# Patient Record
Sex: MALE | Race: WHITE | Employment: OTHER | URBAN - METROPOLITAN AREA
[De-identification: names, ages, dates, MRNs, and addresses within clinical notes are randomized per-mention and may not be internally consistent; named-entity substitution may affect disease eponyms.]

---

## 2020-01-01 ENCOUNTER — APPOINTMENT (OUTPATIENT)
Dept: GENERAL RADIOLOGY | Age: 70
DRG: 190 | End: 2020-01-01
Attending: INTERNAL MEDICINE
Payer: MEDICARE

## 2020-01-01 ENCOUNTER — APPOINTMENT (OUTPATIENT)
Dept: NON INVASIVE DIAGNOSTICS | Age: 70
DRG: 190 | End: 2020-01-01
Attending: INTERNAL MEDICINE
Payer: MEDICARE

## 2020-01-01 ENCOUNTER — HOSPICE ADMISSION (OUTPATIENT)
Dept: HOSPICE | Facility: HOSPICE | Age: 70
End: 2020-01-01
Payer: MEDICARE

## 2020-01-01 ENCOUNTER — APPOINTMENT (OUTPATIENT)
Dept: GENERAL RADIOLOGY | Age: 70
DRG: 190 | End: 2020-01-01
Attending: NURSE PRACTITIONER
Payer: MEDICARE

## 2020-01-01 ENCOUNTER — APPOINTMENT (OUTPATIENT)
Dept: GENERAL RADIOLOGY | Age: 70
DRG: 190 | End: 2020-01-01
Attending: EMERGENCY MEDICINE
Payer: MEDICARE

## 2020-01-01 ENCOUNTER — HOSPITAL ENCOUNTER (INPATIENT)
Age: 70
LOS: 1 days | DRG: 951 | End: 2020-03-26
Attending: FAMILY MEDICINE | Admitting: FAMILY MEDICINE
Payer: OTHER MISCELLANEOUS

## 2020-01-01 ENCOUNTER — HOSPITAL ENCOUNTER (INPATIENT)
Age: 70
LOS: 14 days | Discharge: HOSPICE | DRG: 190 | End: 2020-03-25
Attending: EMERGENCY MEDICINE | Admitting: INTERNAL MEDICINE
Payer: MEDICARE

## 2020-01-01 ENCOUNTER — APPOINTMENT (OUTPATIENT)
Dept: CT IMAGING | Age: 70
DRG: 190 | End: 2020-01-01
Attending: EMERGENCY MEDICINE
Payer: MEDICARE

## 2020-01-01 VITALS
OXYGEN SATURATION: 88 % | HEART RATE: 90 BPM | HEIGHT: 69 IN | BODY MASS INDEX: 22.16 KG/M2 | WEIGHT: 149.6 LBS | DIASTOLIC BLOOD PRESSURE: 58 MMHG | SYSTOLIC BLOOD PRESSURE: 145 MMHG | RESPIRATION RATE: 21 BRPM | TEMPERATURE: 98 F

## 2020-01-01 VITALS
TEMPERATURE: 97.9 F | OXYGEN SATURATION: 87 % | DIASTOLIC BLOOD PRESSURE: 67 MMHG | RESPIRATION RATE: 9 BRPM | SYSTOLIC BLOOD PRESSURE: 158 MMHG | HEART RATE: 96 BPM

## 2020-01-01 DIAGNOSIS — J96.21 ACUTE ON CHRONIC RESPIRATORY FAILURE WITH HYPOXIA (HCC): ICD-10-CM

## 2020-01-01 DIAGNOSIS — J44.1 COPD WITH ACUTE EXACERBATION (HCC): ICD-10-CM

## 2020-01-01 DIAGNOSIS — F41.9 ANXIETY: ICD-10-CM

## 2020-01-01 DIAGNOSIS — Z71.89 DNR (DO NOT RESUSCITATE) DISCUSSION: ICD-10-CM

## 2020-01-01 DIAGNOSIS — J44.1 ACUTE EXACERBATION OF CHRONIC OBSTRUCTIVE PULMONARY DISEASE (COPD) (HCC): Primary | ICD-10-CM

## 2020-01-01 DIAGNOSIS — Z71.89 GOALS OF CARE, COUNSELING/DISCUSSION: ICD-10-CM

## 2020-01-01 DIAGNOSIS — J20.9 ACUTE BRONCHITIS, UNSPECIFIED ORGANISM: ICD-10-CM

## 2020-01-01 DIAGNOSIS — Z71.89 ADVANCED CARE PLANNING/COUNSELING DISCUSSION: ICD-10-CM

## 2020-01-01 DIAGNOSIS — R06.02 SOB (SHORTNESS OF BREATH): ICD-10-CM

## 2020-01-01 DIAGNOSIS — R06.02 SHORTNESS OF BREATH: ICD-10-CM

## 2020-01-01 LAB
ALBUMIN SERPL-MCNC: 3 G/DL (ref 3.5–5)
ALBUMIN SERPL-MCNC: 3.1 G/DL (ref 3.5–5)
ALBUMIN SERPL-MCNC: 3.2 G/DL (ref 3.5–5)
ALBUMIN SERPL-MCNC: 3.2 G/DL (ref 3.5–5)
ALBUMIN SERPL-MCNC: 3.3 G/DL (ref 3.5–5)
ALBUMIN SERPL-MCNC: 3.4 G/DL (ref 3.5–5)
ALBUMIN SERPL-MCNC: 3.5 G/DL (ref 3.5–5)
ALBUMIN SERPL-MCNC: 3.5 G/DL (ref 3.5–5)
ALBUMIN SERPL-MCNC: 3.6 G/DL (ref 3.5–5)
ALBUMIN SERPL-MCNC: 4 G/DL (ref 3.5–5)
ALBUMIN/GLOB SERPL: 1 {RATIO} (ref 1.1–2.2)
ALBUMIN/GLOB SERPL: 1.2 {RATIO} (ref 1.1–2.2)
ALP SERPL-CCNC: 56 U/L (ref 45–117)
ALP SERPL-CCNC: 57 U/L (ref 45–117)
ALP SERPL-CCNC: 60 U/L (ref 45–117)
ALP SERPL-CCNC: 66 U/L (ref 45–117)
ALP SERPL-CCNC: 77 U/L (ref 45–117)
ALT SERPL-CCNC: 25 U/L (ref 12–78)
ALT SERPL-CCNC: 30 U/L (ref 12–78)
ALT SERPL-CCNC: 31 U/L (ref 12–78)
ALT SERPL-CCNC: 34 U/L (ref 12–78)
ALT SERPL-CCNC: 38 U/L (ref 12–78)
ANION GAP SERPL CALC-SCNC: 1 MMOL/L (ref 5–15)
ANION GAP SERPL CALC-SCNC: 2 MMOL/L (ref 5–15)
ANION GAP SERPL CALC-SCNC: 3 MMOL/L (ref 5–15)
ANION GAP SERPL CALC-SCNC: 3 MMOL/L (ref 5–15)
ANION GAP SERPL CALC-SCNC: 4 MMOL/L (ref 5–15)
ANION GAP SERPL CALC-SCNC: 5 MMOL/L (ref 5–15)
ANION GAP SERPL CALC-SCNC: 5 MMOL/L (ref 5–15)
ANION GAP SERPL CALC-SCNC: 6 MMOL/L (ref 5–15)
ANION GAP SERPL CALC-SCNC: 6 MMOL/L (ref 5–15)
ANION GAP SERPL CALC-SCNC: 7 MMOL/L (ref 5–15)
APPEARANCE UR: ABNORMAL
ARTERIAL PATENCY WRIST A: ABNORMAL
ARTERIAL PATENCY WRIST A: YES
AST SERPL-CCNC: 17 U/L (ref 15–37)
AST SERPL-CCNC: 22 U/L (ref 15–37)
AST SERPL-CCNC: 23 U/L (ref 15–37)
AST SERPL-CCNC: 28 U/L (ref 15–37)
AST SERPL-CCNC: 33 U/L (ref 15–37)
ATRIAL RATE: 136 BPM
ATRIAL RATE: 138 BPM
ATRIAL RATE: 96 BPM
AV VELOCITY RATIO: 0.7
B PERT DNA SPEC QL NAA+PROBE: NOT DETECTED
BACTERIA SPEC CULT: NORMAL
BACTERIA SPEC CULT: NORMAL
BASE EXCESS BLDA CALC-SCNC: 0.6 MMOL/L
BASE EXCESS BLDA CALC-SCNC: 1.2 MMOL/L
BASE EXCESS BLDA CALC-SCNC: 1.8 MMOL/L
BASE EXCESS BLDA CALC-SCNC: 10.3 MMOL/L
BASE EXCESS BLDA CALC-SCNC: 10.6 MMOL/L
BASE EXCESS BLDA CALC-SCNC: 11.9 MMOL/L
BASE EXCESS BLDA CALC-SCNC: 4.8 MMOL/L
BASE EXCESS BLDA CALC-SCNC: 5.8 MMOL/L
BASOPHILS # BLD: 0 K/UL (ref 0–0.1)
BASOPHILS NFR BLD: 0 % (ref 0–1)
BDY SITE: ABNORMAL
BILIRUB SERPL-MCNC: 0.2 MG/DL (ref 0.2–1)
BILIRUB SERPL-MCNC: 0.2 MG/DL (ref 0.2–1)
BILIRUB SERPL-MCNC: 0.5 MG/DL (ref 0.2–1)
BILIRUB SERPL-MCNC: 0.5 MG/DL (ref 0.2–1)
BILIRUB SERPL-MCNC: 0.6 MG/DL (ref 0.2–1)
BILIRUB UR QL: NEGATIVE
BNP SERPL-MCNC: 2976 PG/ML
BORDETELLA PARAPERTUSSIS PCR, BORPAR: NOT DETECTED
BUN SERPL-MCNC: 10 MG/DL (ref 6–20)
BUN SERPL-MCNC: 16 MG/DL (ref 6–20)
BUN SERPL-MCNC: 18 MG/DL (ref 6–20)
BUN SERPL-MCNC: 19 MG/DL (ref 6–20)
BUN SERPL-MCNC: 19 MG/DL (ref 6–20)
BUN SERPL-MCNC: 22 MG/DL (ref 6–20)
BUN SERPL-MCNC: 23 MG/DL (ref 6–20)
BUN SERPL-MCNC: 26 MG/DL (ref 6–20)
BUN SERPL-MCNC: 7 MG/DL (ref 6–20)
BUN SERPL-MCNC: 9 MG/DL (ref 6–20)
BUN/CREAT SERPL: 11 (ref 12–20)
BUN/CREAT SERPL: 16 (ref 12–20)
BUN/CREAT SERPL: 21 (ref 12–20)
BUN/CREAT SERPL: 22 (ref 12–20)
BUN/CREAT SERPL: 23 (ref 12–20)
BUN/CREAT SERPL: 24 (ref 12–20)
BUN/CREAT SERPL: 25 (ref 12–20)
BUN/CREAT SERPL: 27 (ref 12–20)
BUN/CREAT SERPL: 27 (ref 12–20)
BUN/CREAT SERPL: 33 (ref 12–20)
BUN/CREAT SERPL: 8 (ref 12–20)
C PNEUM DNA SPEC QL NAA+PROBE: NOT DETECTED
CALCIUM SERPL-MCNC: 7.8 MG/DL (ref 8.5–10.1)
CALCIUM SERPL-MCNC: 8.1 MG/DL (ref 8.5–10.1)
CALCIUM SERPL-MCNC: 8.2 MG/DL (ref 8.5–10.1)
CALCIUM SERPL-MCNC: 8.3 MG/DL (ref 8.5–10.1)
CALCIUM SERPL-MCNC: 8.4 MG/DL (ref 8.5–10.1)
CALCIUM SERPL-MCNC: 8.4 MG/DL (ref 8.5–10.1)
CALCIUM SERPL-MCNC: 8.5 MG/DL (ref 8.5–10.1)
CALCIUM SERPL-MCNC: 8.6 MG/DL (ref 8.5–10.1)
CALCIUM SERPL-MCNC: 8.9 MG/DL (ref 8.5–10.1)
CALCIUM SERPL-MCNC: 8.9 MG/DL (ref 8.5–10.1)
CALCULATED P AXIS, ECG09: 82 DEGREES
CALCULATED R AXIS, ECG10: 86 DEGREES
CALCULATED R AXIS, ECG10: 89 DEGREES
CALCULATED R AXIS, ECG10: 90 DEGREES
CALCULATED T AXIS, ECG11: -61 DEGREES
CALCULATED T AXIS, ECG11: 22 DEGREES
CALCULATED T AXIS, ECG11: 62 DEGREES
CHLORIDE SERPL-SCNC: 84 MMOL/L (ref 97–108)
CHLORIDE SERPL-SCNC: 88 MMOL/L (ref 97–108)
CHLORIDE SERPL-SCNC: 89 MMOL/L (ref 97–108)
CHLORIDE SERPL-SCNC: 90 MMOL/L (ref 97–108)
CHLORIDE SERPL-SCNC: 90 MMOL/L (ref 97–108)
CHLORIDE SERPL-SCNC: 92 MMOL/L (ref 97–108)
CHLORIDE SERPL-SCNC: 93 MMOL/L (ref 97–108)
CHLORIDE SERPL-SCNC: 93 MMOL/L (ref 97–108)
CHLORIDE SERPL-SCNC: 94 MMOL/L (ref 97–108)
CHLORIDE SERPL-SCNC: 95 MMOL/L (ref 97–108)
CHLORIDE SERPL-SCNC: 99 MMOL/L (ref 97–108)
CK SERPL-CCNC: 223 U/L (ref 39–308)
CO2 SERPL-SCNC: 29 MMOL/L (ref 21–32)
CO2 SERPL-SCNC: 32 MMOL/L (ref 21–32)
CO2 SERPL-SCNC: 32 MMOL/L (ref 21–32)
CO2 SERPL-SCNC: 33 MMOL/L (ref 21–32)
CO2 SERPL-SCNC: 35 MMOL/L (ref 21–32)
CO2 SERPL-SCNC: 37 MMOL/L (ref 21–32)
CO2 SERPL-SCNC: 38 MMOL/L (ref 21–32)
CO2 SERPL-SCNC: 38 MMOL/L (ref 21–32)
CO2 SERPL-SCNC: 39 MMOL/L (ref 21–32)
CO2 SERPL-SCNC: 40 MMOL/L (ref 21–32)
CO2 SERPL-SCNC: 40 MMOL/L (ref 21–32)
CO2 SERPL-SCNC: 41 MMOL/L (ref 21–32)
CO2 SERPL-SCNC: 41 MMOL/L (ref 21–32)
COLOR UR: ABNORMAL
COMMENT, HOLDF: NORMAL
COMMENT, HOLDF: NORMAL
CREAT SERPL-MCNC: 0.64 MG/DL (ref 0.7–1.3)
CREAT SERPL-MCNC: 0.71 MG/DL (ref 0.7–1.3)
CREAT SERPL-MCNC: 0.78 MG/DL (ref 0.7–1.3)
CREAT SERPL-MCNC: 0.8 MG/DL (ref 0.7–1.3)
CREAT SERPL-MCNC: 0.82 MG/DL (ref 0.7–1.3)
CREAT SERPL-MCNC: 0.83 MG/DL (ref 0.7–1.3)
CREAT SERPL-MCNC: 0.83 MG/DL (ref 0.7–1.3)
CREAT SERPL-MCNC: 0.84 MG/DL (ref 0.7–1.3)
CREAT SERPL-MCNC: 0.84 MG/DL (ref 0.7–1.3)
CREAT SERPL-MCNC: 0.88 MG/DL (ref 0.7–1.3)
CREAT SERPL-MCNC: 0.9 MG/DL (ref 0.7–1.3)
CREAT SERPL-MCNC: 0.92 MG/DL (ref 0.7–1.3)
CREAT SERPL-MCNC: 0.92 MG/DL (ref 0.7–1.3)
DIAGNOSIS, 93000: NORMAL
DIFFERENTIAL METHOD BLD: ABNORMAL
ECHO AO ROOT DIAM: 2.98 CM
ECHO AV AREA PEAK VELOCITY: 1.9 CM2
ECHO AV AREA/BSA PEAK VELOCITY: 1.1 CM2/M2
ECHO AV PEAK GRADIENT: 6 MMHG
ECHO AV PEAK VELOCITY: 122.32 CM/S
ECHO EST RA PRESSURE: 8 MMHG
ECHO LA AREA 4C: 20.1 CM2
ECHO LA MAJOR AXIS: 3.26 CM
ECHO LA TO AORTIC ROOT RATIO: 1.1
ECHO LA VOL 4C: 63.84 ML (ref 18–58)
ECHO LA VOLUME INDEX A4C: 35.92 ML/M2 (ref 16–28)
ECHO LV E' SEPTAL VELOCITY: 9.51 CENTIMETER/SECOND
ECHO LV EDV TEICHHOLZ: 56.84 ML
ECHO LV ESV TEICHHOLZ: 18.33 ML
ECHO LV INTERNAL DIMENSION DIASTOLIC: 4.69 CM (ref 4.2–5.9)
ECHO LV INTERNAL DIMENSION SYSTOLIC: 2.93 CM
ECHO LV IVSD: 1.14 CM (ref 0.6–1)
ECHO LV MASS 2D: 216.5 G (ref 88–224)
ECHO LV MASS INDEX 2D: 121.8 G/M2 (ref 49–115)
ECHO LV POSTERIOR WALL DIASTOLIC: 1.04 CM (ref 0.6–1)
ECHO LVOT DIAM: 1.87 CM
ECHO LVOT PEAK GRADIENT: 3 MMHG
ECHO LVOT PEAK VELOCITY: 85.93 CM/S
ECHO MV A VELOCITY: 62.68 CM/S
ECHO MV AREA PHT: 4.4 CM2
ECHO MV E DECELERATION TIME (DT): 173.9 MS
ECHO MV E VELOCITY: 76.31 CM/S
ECHO MV E/A RATIO: 1.22
ECHO MV PRESSURE HALF TIME (PHT): 50.4 MS
ECHO PULMONARY ARTERY SYSTOLIC PRESSURE (PASP): 29.8 MMHG
ECHO PV MAX VELOCITY: 70.34 CM/S
ECHO PV PEAK GRADIENT: 2 MMHG
ECHO RIGHT VENTRICULAR SYSTOLIC PRESSURE (RVSP): 29.8 MMHG
ECHO RV INTERNAL DIMENSION: 3.75 CM
ECHO TV REGURGITANT MAX VELOCITY: 233.22 CM/S
ECHO TV REGURGITANT PEAK GRADIENT: 21.8 MMHG
EOSINOPHIL # BLD: 0 K/UL (ref 0–0.4)
EOSINOPHIL NFR BLD: 0 % (ref 0–7)
EPAP/CPAP/PEEP, PAPEEP: 5
EPAP/CPAP/PEEP, PAPEEP: 6
ERYTHROCYTE [DISTWIDTH] IN BLOOD BY AUTOMATED COUNT: 11.5 % (ref 11.5–14.5)
ERYTHROCYTE [DISTWIDTH] IN BLOOD BY AUTOMATED COUNT: 11.6 % (ref 11.5–14.5)
ERYTHROCYTE [DISTWIDTH] IN BLOOD BY AUTOMATED COUNT: 11.7 % (ref 11.5–14.5)
ERYTHROCYTE [DISTWIDTH] IN BLOOD BY AUTOMATED COUNT: 11.7 % (ref 11.5–14.5)
ERYTHROCYTE [DISTWIDTH] IN BLOOD BY AUTOMATED COUNT: 11.8 % (ref 11.5–14.5)
ERYTHROCYTE [DISTWIDTH] IN BLOOD BY AUTOMATED COUNT: 11.8 % (ref 11.5–14.5)
ERYTHROCYTE [DISTWIDTH] IN BLOOD BY AUTOMATED COUNT: 11.9 % (ref 11.5–14.5)
ERYTHROCYTE [DISTWIDTH] IN BLOOD BY AUTOMATED COUNT: 12 % (ref 11.5–14.5)
FIO2 ON VENT: 100 %
FIO2 ON VENT: 40 %
FIO2 ON VENT: 40 %
FIO2 ON VENT: 50 %
FIO2 ON VENT: 50 %
FIO2 ON VENT: 60 %
FLUAV AG NPH QL IA: NEGATIVE
FLUAV H1 2009 PAND RNA SPEC QL NAA+PROBE: NOT DETECTED
FLUAV H1 RNA SPEC QL NAA+PROBE: NOT DETECTED
FLUAV H3 RNA SPEC QL NAA+PROBE: NOT DETECTED
FLUAV SUBTYP SPEC NAA+PROBE: NOT DETECTED
FLUBV AG NOSE QL IA: NEGATIVE
FLUBV RNA SPEC QL NAA+PROBE: NOT DETECTED
GAS FLOW.O2 O2 DELIVERY SYS: 35 L/MIN
GAS FLOW.O2 O2 DELIVERY SYS: 4 L/MIN
GAS FLOW.O2 O2 DELIVERY SYS: 5 L/MIN
GAS FLOW.O2 SETTING OXYMISER: 14 L/MIN
GLOBULIN SER CALC-MCNC: 2.5 G/DL (ref 2–4)
GLOBULIN SER CALC-MCNC: 2.8 G/DL (ref 2–4)
GLOBULIN SER CALC-MCNC: 2.9 G/DL (ref 2–4)
GLOBULIN SER CALC-MCNC: 3 G/DL (ref 2–4)
GLOBULIN SER CALC-MCNC: 3.4 G/DL (ref 2–4)
GLUCOSE SERPL-MCNC: 114 MG/DL (ref 65–100)
GLUCOSE SERPL-MCNC: 136 MG/DL (ref 65–100)
GLUCOSE SERPL-MCNC: 140 MG/DL (ref 65–100)
GLUCOSE SERPL-MCNC: 154 MG/DL (ref 65–100)
GLUCOSE SERPL-MCNC: 156 MG/DL (ref 65–100)
GLUCOSE SERPL-MCNC: 165 MG/DL (ref 65–100)
GLUCOSE SERPL-MCNC: 170 MG/DL (ref 65–100)
GLUCOSE SERPL-MCNC: 173 MG/DL (ref 65–100)
GLUCOSE SERPL-MCNC: 178 MG/DL (ref 65–100)
GLUCOSE SERPL-MCNC: 179 MG/DL (ref 65–100)
GLUCOSE SERPL-MCNC: 187 MG/DL (ref 65–100)
GLUCOSE SERPL-MCNC: 197 MG/DL (ref 65–100)
GLUCOSE SERPL-MCNC: 209 MG/DL (ref 65–100)
GLUCOSE UR STRIP.AUTO-MCNC: NEGATIVE MG/DL
HADV DNA SPEC QL NAA+PROBE: NOT DETECTED
HCO3 BLDA-SCNC: 27 MMOL/L (ref 22–26)
HCO3 BLDA-SCNC: 29 MMOL/L (ref 22–26)
HCO3 BLDA-SCNC: 32 MMOL/L (ref 22–26)
HCO3 BLDA-SCNC: 33 MMOL/L (ref 22–26)
HCO3 BLDA-SCNC: 35 MMOL/L (ref 22–26)
HCO3 BLDA-SCNC: 38 MMOL/L (ref 22–26)
HCO3 BLDA-SCNC: 38 MMOL/L (ref 22–26)
HCO3 BLDA-SCNC: 39 MMOL/L (ref 22–26)
HCOV 229E RNA SPEC QL NAA+PROBE: NOT DETECTED
HCOV HKU1 RNA SPEC QL NAA+PROBE: NOT DETECTED
HCOV NL63 RNA SPEC QL NAA+PROBE: NOT DETECTED
HCOV OC43 RNA SPEC QL NAA+PROBE: NOT DETECTED
HCT VFR BLD AUTO: 36.6 % (ref 36.6–50.3)
HCT VFR BLD AUTO: 37.3 % (ref 36.6–50.3)
HCT VFR BLD AUTO: 37.3 % (ref 36.6–50.3)
HCT VFR BLD AUTO: 37.6 % (ref 36.6–50.3)
HCT VFR BLD AUTO: 37.8 % (ref 36.6–50.3)
HCT VFR BLD AUTO: 38.9 % (ref 36.6–50.3)
HCT VFR BLD AUTO: 39.3 % (ref 36.6–50.3)
HCT VFR BLD AUTO: 39.4 % (ref 36.6–50.3)
HCT VFR BLD AUTO: 39.9 % (ref 36.6–50.3)
HCT VFR BLD AUTO: 40.9 % (ref 36.6–50.3)
HCT VFR BLD AUTO: 41.1 % (ref 36.6–50.3)
HCT VFR BLD AUTO: 41.7 % (ref 36.6–50.3)
HGB BLD-MCNC: 12.2 G/DL (ref 12.1–17)
HGB BLD-MCNC: 12.4 G/DL (ref 12.1–17)
HGB BLD-MCNC: 12.4 G/DL (ref 12.1–17)
HGB BLD-MCNC: 12.6 G/DL (ref 12.1–17)
HGB BLD-MCNC: 12.8 G/DL (ref 12.1–17)
HGB BLD-MCNC: 13 G/DL (ref 12.1–17)
HGB BLD-MCNC: 13.1 G/DL (ref 12.1–17)
HGB BLD-MCNC: 13.2 G/DL (ref 12.1–17)
HGB BLD-MCNC: 13.5 G/DL (ref 12.1–17)
HGB BLD-MCNC: 13.6 G/DL (ref 12.1–17)
HGB BLD-MCNC: 13.6 G/DL (ref 12.1–17)
HGB BLD-MCNC: 14.1 G/DL (ref 12.1–17)
HGB UR QL STRIP: NEGATIVE
HMPV RNA SPEC QL NAA+PROBE: NOT DETECTED
HPIV1 RNA SPEC QL NAA+PROBE: NOT DETECTED
HPIV2 RNA SPEC QL NAA+PROBE: NOT DETECTED
HPIV3 RNA SPEC QL NAA+PROBE: NOT DETECTED
HPIV4 RNA SPEC QL NAA+PROBE: NOT DETECTED
IMM GRANULOCYTES # BLD AUTO: 0 K/UL (ref 0–0.04)
IMM GRANULOCYTES # BLD AUTO: 0.1 K/UL (ref 0–0.04)
IMM GRANULOCYTES NFR BLD AUTO: 0 % (ref 0–0.5)
IMM GRANULOCYTES NFR BLD AUTO: 1 % (ref 0–0.5)
IMM GRANULOCYTES NFR BLD AUTO: 1 % (ref 0–0.5)
IPAP/PIP, IPAPIP: 24
KETONES UR QL STRIP.AUTO: NEGATIVE MG/DL
LACTATE SERPL-SCNC: 1 MMOL/L (ref 0.4–2)
LACTATE SERPL-SCNC: 1.8 MMOL/L (ref 0.4–2)
LACTATE SERPL-SCNC: 1.8 MMOL/L (ref 0.4–2)
LACTATE SERPL-SCNC: 2 MMOL/L (ref 0.4–2)
LACTATE SERPL-SCNC: 2 MMOL/L (ref 0.4–2)
LACTATE SERPL-SCNC: 2.4 MMOL/L (ref 0.4–2)
LACTATE SERPL-SCNC: 2.6 MMOL/L (ref 0.4–2)
LACTATE SERPL-SCNC: 2.7 MMOL/L (ref 0.4–2)
LACTATE SERPL-SCNC: 3.5 MMOL/L (ref 0.4–2)
LEUKOCYTE ESTERASE UR QL STRIP.AUTO: NEGATIVE
LVFS 2D: 37.67 %
LVSV (TEICH): 38.51 ML
LYMPHOCYTES # BLD: 0.2 K/UL (ref 0.8–3.5)
LYMPHOCYTES # BLD: 0.2 K/UL (ref 0.8–3.5)
LYMPHOCYTES # BLD: 0.3 K/UL (ref 0.8–3.5)
LYMPHOCYTES # BLD: 0.5 K/UL (ref 0.8–3.5)
LYMPHOCYTES # BLD: 0.5 K/UL (ref 0.8–3.5)
LYMPHOCYTES NFR BLD: 3 % (ref 12–49)
LYMPHOCYTES NFR BLD: 4 % (ref 12–49)
LYMPHOCYTES NFR BLD: 5 % (ref 12–49)
LYMPHOCYTES NFR BLD: 5 % (ref 12–49)
LYMPHOCYTES NFR BLD: 7 % (ref 12–49)
M PNEUMO DNA SPEC QL NAA+PROBE: NOT DETECTED
MAGNESIUM SERPL-MCNC: 1.2 MG/DL (ref 1.6–2.4)
MAGNESIUM SERPL-MCNC: 1.6 MG/DL (ref 1.6–2.4)
MAGNESIUM SERPL-MCNC: 1.8 MG/DL (ref 1.6–2.4)
MAGNESIUM SERPL-MCNC: 1.8 MG/DL (ref 1.6–2.4)
MAGNESIUM SERPL-MCNC: 1.9 MG/DL (ref 1.6–2.4)
MAGNESIUM SERPL-MCNC: 2.1 MG/DL (ref 1.6–2.4)
MCH RBC QN AUTO: 32.2 PG (ref 26–34)
MCH RBC QN AUTO: 32.3 PG (ref 26–34)
MCH RBC QN AUTO: 32.6 PG (ref 26–34)
MCH RBC QN AUTO: 32.7 PG (ref 26–34)
MCH RBC QN AUTO: 32.7 PG (ref 26–34)
MCH RBC QN AUTO: 32.8 PG (ref 26–34)
MCH RBC QN AUTO: 32.9 PG (ref 26–34)
MCH RBC QN AUTO: 33 PG (ref 26–34)
MCH RBC QN AUTO: 33.1 PG (ref 26–34)
MCH RBC QN AUTO: 33.2 PG (ref 26–34)
MCHC RBC AUTO-ENTMCNC: 32.6 G/DL (ref 30–36.5)
MCHC RBC AUTO-ENTMCNC: 32.7 G/DL (ref 30–36.5)
MCHC RBC AUTO-ENTMCNC: 33.1 G/DL (ref 30–36.5)
MCHC RBC AUTO-ENTMCNC: 33.2 G/DL (ref 30–36.5)
MCHC RBC AUTO-ENTMCNC: 33.2 G/DL (ref 30–36.5)
MCHC RBC AUTO-ENTMCNC: 33.3 G/DL (ref 30–36.5)
MCHC RBC AUTO-ENTMCNC: 33.3 G/DL (ref 30–36.5)
MCHC RBC AUTO-ENTMCNC: 33.8 G/DL (ref 30–36.5)
MCHC RBC AUTO-ENTMCNC: 33.9 G/DL (ref 30–36.5)
MCHC RBC AUTO-ENTMCNC: 33.9 G/DL (ref 30–36.5)
MCHC RBC AUTO-ENTMCNC: 34 G/DL (ref 30–36.5)
MCHC RBC AUTO-ENTMCNC: 34.3 G/DL (ref 30–36.5)
MCV RBC AUTO: 100 FL (ref 80–99)
MCV RBC AUTO: 100.5 FL (ref 80–99)
MCV RBC AUTO: 95.8 FL (ref 80–99)
MCV RBC AUTO: 96.3 FL (ref 80–99)
MCV RBC AUTO: 96.7 FL (ref 80–99)
MCV RBC AUTO: 96.7 FL (ref 80–99)
MCV RBC AUTO: 97 FL (ref 80–99)
MCV RBC AUTO: 97.6 FL (ref 80–99)
MCV RBC AUTO: 97.7 FL (ref 80–99)
MCV RBC AUTO: 98.7 FL (ref 80–99)
MCV RBC AUTO: 98.8 FL (ref 80–99)
MCV RBC AUTO: 99.5 FL (ref 80–99)
MONOCYTES # BLD: 0.3 K/UL (ref 0–1)
MONOCYTES # BLD: 0.3 K/UL (ref 0–1)
MONOCYTES # BLD: 0.5 K/UL (ref 0–1)
MONOCYTES # BLD: 0.6 K/UL (ref 0–1)
MONOCYTES # BLD: 0.7 K/UL (ref 0–1)
MONOCYTES # BLD: 0.8 K/UL (ref 0–1)
MONOCYTES # BLD: 1.2 K/UL (ref 0–1)
MONOCYTES NFR BLD: 10 % (ref 5–13)
MONOCYTES NFR BLD: 10 % (ref 5–13)
MONOCYTES NFR BLD: 13 % (ref 5–13)
MONOCYTES NFR BLD: 4 % (ref 5–13)
MONOCYTES NFR BLD: 5 % (ref 5–13)
MONOCYTES NFR BLD: 8 % (ref 5–13)
MONOCYTES NFR BLD: 9 % (ref 5–13)
MV DEC SLOPE: 4.39
NEUTS SEG # BLD: 4.4 K/UL (ref 1.8–8)
NEUTS SEG # BLD: 4.9 K/UL (ref 1.8–8)
NEUTS SEG # BLD: 5.5 K/UL (ref 1.8–8)
NEUTS SEG # BLD: 6.5 K/UL (ref 1.8–8)
NEUTS SEG # BLD: 6.5 K/UL (ref 1.8–8)
NEUTS SEG # BLD: 6.8 K/UL (ref 1.8–8)
NEUTS SEG # BLD: 7.5 K/UL (ref 1.8–8)
NEUTS SEG NFR BLD: 82 % (ref 32–75)
NEUTS SEG NFR BLD: 82 % (ref 32–75)
NEUTS SEG NFR BLD: 86 % (ref 32–75)
NEUTS SEG NFR BLD: 86 % (ref 32–75)
NEUTS SEG NFR BLD: 87 % (ref 32–75)
NEUTS SEG NFR BLD: 91 % (ref 32–75)
NEUTS SEG NFR BLD: 93 % (ref 32–75)
NITRITE UR QL STRIP.AUTO: NEGATIVE
NRBC # BLD: 0 K/UL (ref 0–0.01)
NRBC BLD-RTO: 0 PER 100 WBC
P-R INTERVAL, ECG05: 152 MS
PCO2 BLDA: 47 MMHG (ref 35–45)
PCO2 BLDA: 56 MMHG (ref 35–45)
PCO2 BLDA: 58 MMHG (ref 35–45)
PCO2 BLDA: 64 MMHG (ref 35–45)
PCO2 BLDA: 67 MMHG (ref 35–45)
PCO2 BLDA: 70 MMHG (ref 35–45)
PCO2 BLDA: 73 MMHG (ref 35–45)
PCO2 BLDA: 83 MMHG (ref 35–45)
PH BLDA: 7.21 [PH] (ref 7.35–7.45)
PH BLDA: 7.31 [PH] (ref 7.35–7.45)
PH BLDA: 7.34 [PH] (ref 7.35–7.45)
PH BLDA: 7.37 [PH] (ref 7.35–7.45)
PH BLDA: 7.4 [PH] (ref 7.35–7.45)
PH BLDA: 7.46 [PH] (ref 7.35–7.45)
PH UR STRIP: 8 [PH] (ref 5–8)
PHOSPHATE SERPL-MCNC: 2.4 MG/DL (ref 2.6–4.7)
PHOSPHATE SERPL-MCNC: 2.7 MG/DL (ref 2.6–4.7)
PHOSPHATE SERPL-MCNC: 2.8 MG/DL (ref 2.6–4.7)
PHOSPHATE SERPL-MCNC: 3 MG/DL (ref 2.6–4.7)
PHOSPHATE SERPL-MCNC: 3.5 MG/DL (ref 2.6–4.7)
PHOSPHATE SERPL-MCNC: 3.5 MG/DL (ref 2.6–4.7)
PHOSPHATE SERPL-MCNC: 3.7 MG/DL (ref 2.6–4.7)
PHOSPHATE SERPL-MCNC: 4 MG/DL (ref 2.6–4.7)
PHOSPHATE SERPL-MCNC: 4.6 MG/DL (ref 2.6–4.7)
PLATELET # BLD AUTO: 164 K/UL (ref 150–400)
PLATELET # BLD AUTO: 171 K/UL (ref 150–400)
PLATELET # BLD AUTO: 172 K/UL (ref 150–400)
PLATELET # BLD AUTO: 172 K/UL (ref 150–400)
PLATELET # BLD AUTO: 174 K/UL (ref 150–400)
PLATELET # BLD AUTO: 180 K/UL (ref 150–400)
PLATELET # BLD AUTO: 183 K/UL (ref 150–400)
PLATELET # BLD AUTO: 188 K/UL (ref 150–400)
PLATELET # BLD AUTO: 188 K/UL (ref 150–400)
PLATELET # BLD AUTO: 199 K/UL (ref 150–400)
PLATELET # BLD AUTO: 207 K/UL (ref 150–400)
PLATELET # BLD AUTO: 233 K/UL (ref 150–400)
PMV BLD AUTO: 8.4 FL (ref 8.9–12.9)
PMV BLD AUTO: 8.7 FL (ref 8.9–12.9)
PMV BLD AUTO: 8.8 FL (ref 8.9–12.9)
PMV BLD AUTO: 8.8 FL (ref 8.9–12.9)
PMV BLD AUTO: 8.9 FL (ref 8.9–12.9)
PMV BLD AUTO: 9.1 FL (ref 8.9–12.9)
PMV BLD AUTO: 9.2 FL (ref 8.9–12.9)
PMV BLD AUTO: 9.3 FL (ref 8.9–12.9)
PMV BLD AUTO: 9.3 FL (ref 8.9–12.9)
PO2 BLDA: 104 MMHG (ref 80–100)
PO2 BLDA: 140 MMHG (ref 80–100)
PO2 BLDA: 154 MMHG (ref 80–100)
PO2 BLDA: 291 MMHG (ref 80–100)
PO2 BLDA: 70 MMHG (ref 80–100)
PO2 BLDA: 77 MMHG (ref 80–100)
PO2 BLDA: 81 MMHG (ref 80–100)
PO2 BLDA: 95 MMHG (ref 80–100)
POTASSIUM SERPL-SCNC: 3.7 MMOL/L (ref 3.5–5.1)
POTASSIUM SERPL-SCNC: 4 MMOL/L (ref 3.5–5.1)
POTASSIUM SERPL-SCNC: 4.1 MMOL/L (ref 3.5–5.1)
POTASSIUM SERPL-SCNC: 4.1 MMOL/L (ref 3.5–5.1)
POTASSIUM SERPL-SCNC: 4.3 MMOL/L (ref 3.5–5.1)
POTASSIUM SERPL-SCNC: 4.3 MMOL/L (ref 3.5–5.1)
POTASSIUM SERPL-SCNC: 4.4 MMOL/L (ref 3.5–5.1)
POTASSIUM SERPL-SCNC: 4.5 MMOL/L (ref 3.5–5.1)
POTASSIUM SERPL-SCNC: 4.6 MMOL/L (ref 3.5–5.1)
PROCALCITONIN SERPL-MCNC: <0.05 NG/ML
PROT SERPL-MCNC: 5.6 G/DL (ref 6.4–8.2)
PROT SERPL-MCNC: 5.9 G/DL (ref 6.4–8.2)
PROT SERPL-MCNC: 6.2 G/DL (ref 6.4–8.2)
PROT SERPL-MCNC: 6.5 G/DL (ref 6.4–8.2)
PROT SERPL-MCNC: 7.4 G/DL (ref 6.4–8.2)
PROT UR STRIP-MCNC: NEGATIVE MG/DL
Q-T INTERVAL, ECG07: 326 MS
Q-T INTERVAL, ECG07: 338 MS
Q-T INTERVAL, ECG07: 346 MS
QRS DURATION, ECG06: 88 MS
QRS DURATION, ECG06: 96 MS
QRS DURATION, ECG06: 98 MS
QTC CALCULATION (BEZET), ECG08: 437 MS
QTC CALCULATION (BEZET), ECG08: 473 MS
QTC CALCULATION (BEZET), ECG08: 503 MS
RBC # BLD AUTO: 3.73 M/UL (ref 4.1–5.7)
RBC # BLD AUTO: 3.75 M/UL (ref 4.1–5.7)
RBC # BLD AUTO: 3.8 M/UL (ref 4.1–5.7)
RBC # BLD AUTO: 3.89 M/UL (ref 4.1–5.7)
RBC # BLD AUTO: 3.91 M/UL (ref 4.1–5.7)
RBC # BLD AUTO: 3.98 M/UL (ref 4.1–5.7)
RBC # BLD AUTO: 3.98 M/UL (ref 4.1–5.7)
RBC # BLD AUTO: 4.06 M/UL (ref 4.1–5.7)
RBC # BLD AUTO: 4.09 M/UL (ref 4.1–5.7)
RBC # BLD AUTO: 4.14 M/UL (ref 4.1–5.7)
RBC # BLD AUTO: 4.15 M/UL (ref 4.1–5.7)
RBC # BLD AUTO: 4.29 M/UL (ref 4.1–5.7)
RBC MORPH BLD: ABNORMAL
RSV RNA SPEC QL NAA+PROBE: NOT DETECTED
RV+EV RNA SPEC QL NAA+PROBE: NOT DETECTED
SAMPLES BEING HELD,HOLD: NORMAL
SAMPLES BEING HELD,HOLD: NORMAL
SAO2 % BLD: 100 % (ref 92–97)
SAO2 % BLD: 92 % (ref 92–97)
SAO2 % BLD: 95 % (ref 92–97)
SAO2 % BLD: 96 % (ref 92–97)
SAO2 % BLD: 96 % (ref 92–97)
SAO2 % BLD: 97 % (ref 92–97)
SAO2 % BLD: 98 % (ref 92–97)
SAO2 % BLD: 99 % (ref 92–97)
SAO2% DEVICE SAO2% SENSOR NAME: ABNORMAL
SERVICE CMNT-IMP: ABNORMAL
SERVICE CMNT-IMP: NORMAL
SERVICE CMNT-IMP: NORMAL
SODIUM SERPL-SCNC: 128 MMOL/L (ref 136–145)
SODIUM SERPL-SCNC: 129 MMOL/L (ref 136–145)
SODIUM SERPL-SCNC: 129 MMOL/L (ref 136–145)
SODIUM SERPL-SCNC: 130 MMOL/L (ref 136–145)
SODIUM SERPL-SCNC: 131 MMOL/L (ref 136–145)
SODIUM SERPL-SCNC: 131 MMOL/L (ref 136–145)
SODIUM SERPL-SCNC: 132 MMOL/L (ref 136–145)
SODIUM SERPL-SCNC: 133 MMOL/L (ref 136–145)
SODIUM SERPL-SCNC: 134 MMOL/L (ref 136–145)
SODIUM SERPL-SCNC: 135 MMOL/L (ref 136–145)
SP GR UR REFRACTOMETRY: 1.01 (ref 1–1.03)
SPECIMEN SITE: ABNORMAL
TROPONIN I SERPL-MCNC: <0.05 NG/ML
TSH SERPL DL<=0.05 MIU/L-ACNC: 0.47 UIU/ML (ref 0.36–3.74)
UR CULT HOLD, URHOLD: NORMAL
UROBILINOGEN UR QL STRIP.AUTO: 0.2 EU/DL (ref 0.2–1)
VENTILATION MODE VENT: ABNORMAL
VENTILATION MODE VENT: ABNORMAL
VENTRICULAR RATE, ECG03: 127 BPM
VENTRICULAR RATE, ECG03: 133 BPM
VENTRICULAR RATE, ECG03: 96 BPM
WBC # BLD AUTO: 11.1 K/UL (ref 4.1–11.1)
WBC # BLD AUTO: 5.2 K/UL (ref 4.1–11.1)
WBC # BLD AUTO: 5.4 K/UL (ref 4.1–11.1)
WBC # BLD AUTO: 6.6 K/UL (ref 4.1–11.1)
WBC # BLD AUTO: 6.7 K/UL (ref 4.1–11.1)
WBC # BLD AUTO: 7 K/UL (ref 4.1–11.1)
WBC # BLD AUTO: 7.4 K/UL (ref 4.1–11.1)
WBC # BLD AUTO: 7.7 K/UL (ref 4.1–11.1)
WBC # BLD AUTO: 7.8 K/UL (ref 4.1–11.1)
WBC # BLD AUTO: 7.8 K/UL (ref 4.1–11.1)
WBC # BLD AUTO: 8.4 K/UL (ref 4.1–11.1)
WBC # BLD AUTO: 9.2 K/UL (ref 4.1–11.1)

## 2020-01-01 PROCEDURE — 74011250637 HC RX REV CODE- 250/637: Performed by: INTERNAL MEDICINE

## 2020-01-01 PROCEDURE — 94660 CPAP INITIATION&MGMT: CPT

## 2020-01-01 PROCEDURE — 0656 HSPC GENERAL INPATIENT

## 2020-01-01 PROCEDURE — 0100U RESPIRATORY PANEL,PCR,NASOPHARYNGEAL: CPT

## 2020-01-01 PROCEDURE — 83880 ASSAY OF NATRIURETIC PEPTIDE: CPT

## 2020-01-01 PROCEDURE — 94640 AIRWAY INHALATION TREATMENT: CPT

## 2020-01-01 PROCEDURE — 80053 COMPREHEN METABOLIC PANEL: CPT

## 2020-01-01 PROCEDURE — 74011000250 HC RX REV CODE- 250: Performed by: INTERNAL MEDICINE

## 2020-01-01 PROCEDURE — 71045 X-RAY EXAM CHEST 1 VIEW: CPT

## 2020-01-01 PROCEDURE — 83735 ASSAY OF MAGNESIUM: CPT

## 2020-01-01 PROCEDURE — 74011250636 HC RX REV CODE- 250/636: Performed by: INTERNAL MEDICINE

## 2020-01-01 PROCEDURE — 85025 COMPLETE CBC W/AUTO DIFF WBC: CPT

## 2020-01-01 PROCEDURE — 77010033711 HC HIGH FLOW OXYGEN

## 2020-01-01 PROCEDURE — 94760 N-INVAS EAR/PLS OXIMETRY 1: CPT

## 2020-01-01 PROCEDURE — 36415 COLL VENOUS BLD VENIPUNCTURE: CPT

## 2020-01-01 PROCEDURE — 84100 ASSAY OF PHOSPHORUS: CPT

## 2020-01-01 PROCEDURE — 36600 WITHDRAWAL OF ARTERIAL BLOOD: CPT

## 2020-01-01 PROCEDURE — 80048 BASIC METABOLIC PNL TOTAL CA: CPT

## 2020-01-01 PROCEDURE — 65270000029 HC RM PRIVATE

## 2020-01-01 PROCEDURE — 77010033678 HC OXYGEN DAILY

## 2020-01-01 PROCEDURE — 96368 THER/DIAG CONCURRENT INF: CPT

## 2020-01-01 PROCEDURE — 74011000258 HC RX REV CODE- 258: Performed by: INTERNAL MEDICINE

## 2020-01-01 PROCEDURE — 65660000000 HC RM CCU STEPDOWN

## 2020-01-01 PROCEDURE — 93005 ELECTROCARDIOGRAM TRACING: CPT

## 2020-01-01 PROCEDURE — 81003 URINALYSIS AUTO W/O SCOPE: CPT

## 2020-01-01 PROCEDURE — 74011250636 HC RX REV CODE- 250/636: Performed by: NURSE PRACTITIONER

## 2020-01-01 PROCEDURE — 87040 BLOOD CULTURE FOR BACTERIA: CPT

## 2020-01-01 PROCEDURE — 74011250636 HC RX REV CODE- 250/636: Performed by: EMERGENCY MEDICINE

## 2020-01-01 PROCEDURE — 74011250637 HC RX REV CODE- 250/637: Performed by: NURSE PRACTITIONER

## 2020-01-01 PROCEDURE — 85027 COMPLETE CBC AUTOMATED: CPT

## 2020-01-01 PROCEDURE — 92610 EVALUATE SWALLOWING FUNCTION: CPT

## 2020-01-01 PROCEDURE — 83605 ASSAY OF LACTIC ACID: CPT

## 2020-01-01 PROCEDURE — 84145 PROCALCITONIN (PCT): CPT

## 2020-01-01 PROCEDURE — 74011000250 HC RX REV CODE- 250: Performed by: FAMILY MEDICINE

## 2020-01-01 PROCEDURE — 93306 TTE W/DOPPLER COMPLETE: CPT

## 2020-01-01 PROCEDURE — 74011250636 HC RX REV CODE- 250/636: Performed by: FAMILY MEDICINE

## 2020-01-01 PROCEDURE — 82550 ASSAY OF CK (CPK): CPT

## 2020-01-01 PROCEDURE — 74011636320 HC RX REV CODE- 636/320: Performed by: EMERGENCY MEDICINE

## 2020-01-01 PROCEDURE — 74011250636 HC RX REV CODE- 250/636: Performed by: PHYSICIAN ASSISTANT

## 2020-01-01 PROCEDURE — 96375 TX/PRO/DX INJ NEW DRUG ADDON: CPT

## 2020-01-01 PROCEDURE — 94664 DEMO&/EVAL PT USE INHALER: CPT

## 2020-01-01 PROCEDURE — 82803 BLOOD GASES ANY COMBINATION: CPT

## 2020-01-01 PROCEDURE — 77030040831 HC BAG URINE DRNG MDII -A

## 2020-01-01 PROCEDURE — 80069 RENAL FUNCTION PANEL: CPT

## 2020-01-01 PROCEDURE — 71275 CT ANGIOGRAPHY CHEST: CPT

## 2020-01-01 PROCEDURE — 96365 THER/PROPH/DIAG IV INF INIT: CPT

## 2020-01-01 PROCEDURE — 99233 SBSQ HOSP IP/OBS HIGH 50: CPT | Performed by: FAMILY MEDICINE

## 2020-01-01 PROCEDURE — 87804 INFLUENZA ASSAY W/OPTIC: CPT

## 2020-01-01 PROCEDURE — 3336500001 HSPC ELECTION

## 2020-01-01 PROCEDURE — 5A09557 ASSISTANCE WITH RESPIRATORY VENTILATION, GREATER THAN 96 CONSECUTIVE HOURS, CONTINUOUS POSITIVE AIRWAY PRESSURE: ICD-10-PCS | Performed by: INTERNAL MEDICINE

## 2020-01-01 PROCEDURE — 99223 1ST HOSP IP/OBS HIGH 75: CPT | Performed by: FAMILY MEDICINE

## 2020-01-01 PROCEDURE — 77030013038 HC MSK CPAP FISP -A

## 2020-01-01 PROCEDURE — 74011000258 HC RX REV CODE- 258: Performed by: EMERGENCY MEDICINE

## 2020-01-01 PROCEDURE — 99285 EMERGENCY DEPT VISIT HI MDM: CPT

## 2020-01-01 PROCEDURE — 74011250637 HC RX REV CODE- 250/637: Performed by: EMERGENCY MEDICINE

## 2020-01-01 PROCEDURE — 84443 ASSAY THYROID STIM HORMONE: CPT

## 2020-01-01 PROCEDURE — 74011000250 HC RX REV CODE- 250: Performed by: EMERGENCY MEDICINE

## 2020-01-01 PROCEDURE — 77030005513 HC CATH URETH FOL11 MDII -B

## 2020-01-01 PROCEDURE — 84484 ASSAY OF TROPONIN QUANT: CPT

## 2020-01-01 RX ORDER — PROCHLORPERAZINE EDISYLATE 5 MG/ML
10 INJECTION INTRAMUSCULAR; INTRAVENOUS
Status: DISCONTINUED | OUTPATIENT
Start: 2020-01-01 | End: 2020-03-27 | Stop reason: HOSPADM

## 2020-01-01 RX ORDER — HYDROCODONE BITARTRATE AND ACETAMINOPHEN 5; 325 MG/1; MG/1
1 TABLET ORAL
Status: DISCONTINUED | OUTPATIENT
Start: 2020-01-01 | End: 2020-01-01

## 2020-01-01 RX ORDER — ENOXAPARIN SODIUM 100 MG/ML
40 INJECTION SUBCUTANEOUS EVERY 24 HOURS
Status: DISCONTINUED | OUTPATIENT
Start: 2020-01-01 | End: 2020-01-01

## 2020-01-01 RX ORDER — FLUOXETINE HYDROCHLORIDE 20 MG/1
60 CAPSULE ORAL DAILY
Status: DISCONTINUED | OUTPATIENT
Start: 2020-01-01 | End: 2020-01-01

## 2020-01-01 RX ORDER — GUAIFENESIN 200 MG/1
200 TABLET ORAL
COMMUNITY

## 2020-01-01 RX ORDER — FENTANYL CITRATE 50 UG/ML
25 INJECTION, SOLUTION INTRAMUSCULAR; INTRAVENOUS
Status: DISCONTINUED | OUTPATIENT
Start: 2020-01-01 | End: 2020-01-01 | Stop reason: HOSPADM

## 2020-01-01 RX ORDER — FLUOXETINE HYDROCHLORIDE 20 MG/1
60 CAPSULE ORAL DAILY
COMMUNITY

## 2020-01-01 RX ORDER — SODIUM CHLORIDE 0.9 % (FLUSH) 0.9 %
5-40 SYRINGE (ML) INJECTION AS NEEDED
Status: DISCONTINUED | OUTPATIENT
Start: 2020-01-01 | End: 2020-01-01 | Stop reason: HOSPADM

## 2020-01-01 RX ORDER — IBUPROFEN 200 MG
1 TABLET ORAL DAILY
Status: DISCONTINUED | OUTPATIENT
Start: 2020-01-01 | End: 2020-01-01 | Stop reason: HOSPADM

## 2020-01-01 RX ORDER — IPRATROPIUM BROMIDE AND ALBUTEROL SULFATE 2.5; .5 MG/3ML; MG/3ML
3 SOLUTION RESPIRATORY (INHALATION)
Status: DISCONTINUED | OUTPATIENT
Start: 2020-01-01 | End: 2020-01-01 | Stop reason: HOSPADM

## 2020-01-01 RX ORDER — ACETAMINOPHEN 500 MG
1000 TABLET ORAL ONCE
Status: COMPLETED | OUTPATIENT
Start: 2020-01-01 | End: 2020-01-01

## 2020-01-01 RX ORDER — DIAZEPAM 5 MG/1
2.5 TABLET ORAL 3 TIMES DAILY
Status: DISCONTINUED | OUTPATIENT
Start: 2020-01-01 | End: 2020-01-01

## 2020-01-01 RX ORDER — HYDROMORPHONE HYDROCHLORIDE 2 MG/ML
1 INJECTION, SOLUTION INTRAMUSCULAR; INTRAVENOUS; SUBCUTANEOUS
Status: DISCONTINUED | OUTPATIENT
Start: 2020-01-01 | End: 2020-03-27 | Stop reason: HOSPADM

## 2020-01-01 RX ORDER — CHOLECALCIFEROL (VITAMIN D3) 125 MCG
10 CAPSULE ORAL
COMMUNITY

## 2020-01-01 RX ORDER — LANOLIN ALCOHOL/MO/W.PET/CERES
9 CREAM (GRAM) TOPICAL
Status: DISCONTINUED | OUTPATIENT
Start: 2020-01-01 | End: 2020-01-01 | Stop reason: HOSPADM

## 2020-01-01 RX ORDER — PRAZOSIN HYDROCHLORIDE 1 MG/1
4 CAPSULE ORAL
Status: DISCONTINUED | OUTPATIENT
Start: 2020-01-01 | End: 2020-01-01 | Stop reason: HOSPADM

## 2020-01-01 RX ORDER — LANOLIN ALCOHOL/MO/W.PET/CERES
5 CREAM (GRAM) TOPICAL
Status: DISCONTINUED | OUTPATIENT
Start: 2020-01-01 | End: 2020-01-01

## 2020-01-01 RX ORDER — ZIPRASIDONE HYDROCHLORIDE 20 MG/1
40 CAPSULE ORAL 2 TIMES DAILY WITH MEALS
Status: DISCONTINUED | OUTPATIENT
Start: 2020-01-01 | End: 2020-01-01 | Stop reason: HOSPADM

## 2020-01-01 RX ORDER — ATORVASTATIN CALCIUM 20 MG/1
40 TABLET, FILM COATED ORAL
Status: DISCONTINUED | OUTPATIENT
Start: 2020-01-01 | End: 2020-01-01 | Stop reason: HOSPADM

## 2020-01-01 RX ORDER — PRAZOSIN HYDROCHLORIDE 2 MG/1
4 CAPSULE ORAL
COMMUNITY

## 2020-01-01 RX ORDER — PRAZOSIN HYDROCHLORIDE 1 MG/1
2 CAPSULE ORAL
Status: DISCONTINUED | OUTPATIENT
Start: 2020-01-01 | End: 2020-01-01

## 2020-01-01 RX ORDER — METOPROLOL TARTRATE 50 MG/1
50 TABLET ORAL 2 TIMES DAILY
COMMUNITY

## 2020-01-01 RX ORDER — DILTIAZEM HYDROCHLORIDE 120 MG/1
120 CAPSULE, COATED, EXTENDED RELEASE ORAL DAILY
Status: DISCONTINUED | OUTPATIENT
Start: 2020-01-01 | End: 2020-01-01

## 2020-01-01 RX ORDER — BRIMONIDINE TARTRATE 2 MG/ML
1 SOLUTION/ DROPS OPHTHALMIC 3 TIMES DAILY
Status: DISCONTINUED | OUTPATIENT
Start: 2020-01-01 | End: 2020-01-01 | Stop reason: HOSPADM

## 2020-01-01 RX ORDER — SODIUM CHLORIDE 0.9 % (FLUSH) 0.9 %
5-40 SYRINGE (ML) INJECTION EVERY 8 HOURS
Status: DISCONTINUED | OUTPATIENT
Start: 2020-01-01 | End: 2020-01-01 | Stop reason: HOSPADM

## 2020-01-01 RX ORDER — OXYCODONE HYDROCHLORIDE 5 MG/1
5 TABLET ORAL
Status: DISCONTINUED | OUTPATIENT
Start: 2020-01-01 | End: 2020-01-01

## 2020-01-01 RX ORDER — MELATONIN
2000 DAILY
COMMUNITY

## 2020-01-01 RX ORDER — FENTANYL CITRATE 50 UG/ML
25 INJECTION, SOLUTION INTRAMUSCULAR; INTRAVENOUS
Status: DISCONTINUED | OUTPATIENT
Start: 2020-01-01 | End: 2020-01-01

## 2020-01-01 RX ORDER — IPRATROPIUM BROMIDE AND ALBUTEROL SULFATE 2.5; .5 MG/3ML; MG/3ML
3 SOLUTION RESPIRATORY (INHALATION)
Status: DISCONTINUED | OUTPATIENT
Start: 2020-01-01 | End: 2020-01-01

## 2020-01-01 RX ORDER — DILTIAZEM HYDROCHLORIDE 180 MG/1
180 CAPSULE, COATED, EXTENDED RELEASE ORAL DAILY
Status: DISCONTINUED | OUTPATIENT
Start: 2020-01-01 | End: 2020-01-01

## 2020-01-01 RX ORDER — MAGNESIUM SULFATE HEPTAHYDRATE 40 MG/ML
2 INJECTION, SOLUTION INTRAVENOUS ONCE
Status: COMPLETED | OUTPATIENT
Start: 2020-01-01 | End: 2020-01-01

## 2020-01-01 RX ORDER — TRAMADOL HYDROCHLORIDE 50 MG/1
50 TABLET ORAL
Status: DISCONTINUED | OUTPATIENT
Start: 2020-01-01 | End: 2020-01-01

## 2020-01-01 RX ORDER — PEDIATRIC MULTIVITAMIN NO.17
1 TABLET,CHEWABLE ORAL DAILY
COMMUNITY

## 2020-01-01 RX ORDER — SENNOSIDES 8.6 MG/1
2 TABLET ORAL
COMMUNITY

## 2020-01-01 RX ORDER — TRAMADOL HYDROCHLORIDE 50 MG/1
50 TABLET ORAL
Status: DISCONTINUED | OUTPATIENT
Start: 2020-01-01 | End: 2020-01-01 | Stop reason: HOSPADM

## 2020-01-01 RX ORDER — DOCUSATE SODIUM 100 MG/1
100 CAPSULE, LIQUID FILLED ORAL
Status: DISCONTINUED | OUTPATIENT
Start: 2020-01-01 | End: 2020-01-01 | Stop reason: HOSPADM

## 2020-01-01 RX ORDER — GLYCOPYRROLATE 0.2 MG/ML
0.2 INJECTION INTRAMUSCULAR; INTRAVENOUS
Status: DISCONTINUED | OUTPATIENT
Start: 2020-01-01 | End: 2020-03-27 | Stop reason: HOSPADM

## 2020-01-01 RX ORDER — BUDESONIDE AND FORMOTEROL FUMARATE DIHYDRATE 160; 4.5 UG/1; UG/1
2 AEROSOL RESPIRATORY (INHALATION) 2 TIMES DAILY
COMMUNITY

## 2020-01-01 RX ORDER — PANTOPRAZOLE SODIUM 40 MG/1
40 TABLET, DELAYED RELEASE ORAL DAILY
Status: DISCONTINUED | OUTPATIENT
Start: 2020-01-01 | End: 2020-01-01

## 2020-01-01 RX ORDER — DIAZEPAM 5 MG/1
2.5 TABLET ORAL DAILY
COMMUNITY

## 2020-01-01 RX ORDER — SODIUM CHLORIDE 0.9 % (FLUSH) 0.9 %
5-10 SYRINGE (ML) INJECTION AS NEEDED
Status: DISCONTINUED | OUTPATIENT
Start: 2020-01-01 | End: 2020-01-01 | Stop reason: HOSPADM

## 2020-01-01 RX ORDER — KETOROLAC TROMETHAMINE 30 MG/ML
30 INJECTION, SOLUTION INTRAMUSCULAR; INTRAVENOUS
Status: DISCONTINUED | OUTPATIENT
Start: 2020-01-01 | End: 2020-03-27 | Stop reason: HOSPADM

## 2020-01-01 RX ORDER — ZIPRASIDONE HYDROCHLORIDE 40 MG/1
40 CAPSULE ORAL 2 TIMES DAILY WITH MEALS
COMMUNITY

## 2020-01-01 RX ORDER — POLYETHYLENE GLYCOL 3350 17 G/17G
17 POWDER, FOR SOLUTION ORAL 2 TIMES DAILY
Status: DISCONTINUED | OUTPATIENT
Start: 2020-01-01 | End: 2020-01-01 | Stop reason: HOSPADM

## 2020-01-01 RX ORDER — GUAIFENESIN 600 MG/1
600 TABLET, EXTENDED RELEASE ORAL 2 TIMES DAILY
Status: DISCONTINUED | OUTPATIENT
Start: 2020-01-01 | End: 2020-01-01

## 2020-01-01 RX ORDER — IPRATROPIUM BROMIDE 0.5 MG/2.5ML
0.5 SOLUTION RESPIRATORY (INHALATION)
COMMUNITY

## 2020-01-01 RX ORDER — PREDNISONE 5 MG/1
5 TABLET ORAL DAILY
COMMUNITY

## 2020-01-01 RX ORDER — DOCUSATE SODIUM 100 MG/1
100 CAPSULE, LIQUID FILLED ORAL
COMMUNITY

## 2020-01-01 RX ORDER — FACIAL-BODY WIPES
10 EACH TOPICAL DAILY PRN
Status: DISCONTINUED | OUTPATIENT
Start: 2020-01-01 | End: 2020-03-27 | Stop reason: HOSPADM

## 2020-01-01 RX ORDER — DORZOLAMIDE HCL 20 MG/ML
1 SOLUTION/ DROPS OPHTHALMIC 3 TIMES DAILY
Status: DISCONTINUED | OUTPATIENT
Start: 2020-01-01 | End: 2020-01-01 | Stop reason: HOSPADM

## 2020-01-01 RX ORDER — DIAZEPAM 5 MG/1
2.5 TABLET ORAL 2 TIMES DAILY
Status: DISCONTINUED | OUTPATIENT
Start: 2020-01-01 | End: 2020-01-01

## 2020-01-01 RX ORDER — BUMETANIDE 0.25 MG/ML
1 INJECTION INTRAMUSCULAR; INTRAVENOUS DAILY
Status: DISCONTINUED | OUTPATIENT
Start: 2020-01-01 | End: 2020-01-01

## 2020-01-01 RX ORDER — ARFORMOTEROL TARTRATE 15 UG/2ML
15 SOLUTION RESPIRATORY (INHALATION)
Status: DISCONTINUED | OUTPATIENT
Start: 2020-01-01 | End: 2020-01-01 | Stop reason: HOSPADM

## 2020-01-01 RX ORDER — DIAZEPAM 5 MG/1
5 TABLET ORAL
COMMUNITY

## 2020-01-01 RX ORDER — METOPROLOL TARTRATE 50 MG/1
50 TABLET ORAL 2 TIMES DAILY
Status: DISCONTINUED | OUTPATIENT
Start: 2020-01-01 | End: 2020-01-01

## 2020-01-01 RX ORDER — PANTOPRAZOLE SODIUM 40 MG/1
40 TABLET, DELAYED RELEASE ORAL DAILY
COMMUNITY

## 2020-01-01 RX ORDER — SENNOSIDES 8.6 MG/1
2 TABLET ORAL
Status: DISCONTINUED | OUTPATIENT
Start: 2020-01-01 | End: 2020-01-01 | Stop reason: HOSPADM

## 2020-01-01 RX ORDER — MAGNESIUM SULFATE HEPTAHYDRATE 40 MG/ML
2 INJECTION, SOLUTION INTRAVENOUS
Status: COMPLETED | OUTPATIENT
Start: 2020-01-01 | End: 2020-01-01

## 2020-01-01 RX ORDER — ASPIRIN 81 MG/1
81 TABLET ORAL DAILY
Status: DISCONTINUED | OUTPATIENT
Start: 2020-01-01 | End: 2020-01-01

## 2020-01-01 RX ORDER — GUAIFENESIN 600 MG/1
1200 TABLET, EXTENDED RELEASE ORAL 2 TIMES DAILY
Status: DISCONTINUED | OUTPATIENT
Start: 2020-01-01 | End: 2020-01-01 | Stop reason: HOSPADM

## 2020-01-01 RX ORDER — POLYETHYLENE GLYCOL 3350 17 G/17G
17 POWDER, FOR SOLUTION ORAL
COMMUNITY

## 2020-01-01 RX ORDER — FENTANYL CITRATE 50 UG/ML
50 INJECTION, SOLUTION INTRAMUSCULAR; INTRAVENOUS
Status: DISCONTINUED | OUTPATIENT
Start: 2020-01-01 | End: 2020-01-01

## 2020-01-01 RX ORDER — LORAZEPAM 2 MG/ML
1 INJECTION INTRAMUSCULAR
Status: DISCONTINUED | OUTPATIENT
Start: 2020-01-01 | End: 2020-03-27 | Stop reason: HOSPADM

## 2020-01-01 RX ORDER — ASPIRIN 81 MG/1
81 TABLET ORAL DAILY
COMMUNITY

## 2020-01-01 RX ORDER — ACETAMINOPHEN 325 MG/1
650 TABLET ORAL
Status: DISCONTINUED | OUTPATIENT
Start: 2020-01-01 | End: 2020-01-01 | Stop reason: HOSPADM

## 2020-01-01 RX ORDER — ONDANSETRON 2 MG/ML
4 INJECTION INTRAMUSCULAR; INTRAVENOUS
Status: DISCONTINUED | OUTPATIENT
Start: 2020-01-01 | End: 2020-01-01 | Stop reason: HOSPADM

## 2020-01-01 RX ORDER — DILTIAZEM HYDROCHLORIDE 5 MG/ML
10 INJECTION INTRAVENOUS ONCE
Status: COMPLETED | OUTPATIENT
Start: 2020-01-01 | End: 2020-01-01

## 2020-01-01 RX ORDER — OXYCODONE HYDROCHLORIDE 5 MG/1
5 TABLET ORAL
Status: DISCONTINUED | OUTPATIENT
Start: 2020-01-01 | End: 2020-01-01 | Stop reason: HOSPADM

## 2020-01-01 RX ORDER — DIAZEPAM 10 MG/2ML
5 INJECTION INTRAMUSCULAR EVERY 6 HOURS
Status: DISCONTINUED | OUTPATIENT
Start: 2020-01-01 | End: 2020-03-27 | Stop reason: HOSPADM

## 2020-01-01 RX ORDER — ALBUTEROL SULFATE 0.83 MG/ML
2.5 SOLUTION RESPIRATORY (INHALATION)
COMMUNITY

## 2020-01-01 RX ORDER — DIAZEPAM 5 MG/1
2.5 TABLET ORAL EVERY 8 HOURS
Status: DISCONTINUED | OUTPATIENT
Start: 2020-01-01 | End: 2020-01-01 | Stop reason: HOSPADM

## 2020-01-01 RX ORDER — ALBUTEROL SULFATE 90 UG/1
2 AEROSOL, METERED RESPIRATORY (INHALATION)
COMMUNITY

## 2020-01-01 RX ORDER — SODIUM CHLORIDE 9 MG/ML
75 INJECTION, SOLUTION INTRAVENOUS CONTINUOUS
Status: DISCONTINUED | OUTPATIENT
Start: 2020-01-01 | End: 2020-01-01

## 2020-01-01 RX ORDER — PANTOPRAZOLE SODIUM 40 MG/1
40 TABLET, DELAYED RELEASE ORAL
Status: DISCONTINUED | OUTPATIENT
Start: 2020-01-01 | End: 2020-01-01 | Stop reason: HOSPADM

## 2020-01-01 RX ORDER — BUDESONIDE 0.5 MG/2ML
500 INHALANT ORAL
Status: DISCONTINUED | OUTPATIENT
Start: 2020-01-01 | End: 2020-01-01 | Stop reason: HOSPADM

## 2020-01-01 RX ORDER — ATORVASTATIN CALCIUM 80 MG/1
40 TABLET, FILM COATED ORAL
COMMUNITY

## 2020-01-01 RX ORDER — BUMETANIDE 0.25 MG/ML
1 INJECTION INTRAMUSCULAR; INTRAVENOUS 2 TIMES DAILY
Status: DISCONTINUED | OUTPATIENT
Start: 2020-01-01 | End: 2020-01-01

## 2020-01-01 RX ADMIN — DILTIAZEM HYDROCHLORIDE 120 MG: 120 CAPSULE, COATED, EXTENDED RELEASE ORAL at 16:49

## 2020-01-01 RX ADMIN — BRIMONIDINE TARTRATE 1 DROP: 2 SOLUTION OPHTHALMIC at 17:45

## 2020-01-01 RX ADMIN — GLYCOPYRROLATE 0.2 MG: 0.2 INJECTION, SOLUTION INTRAMUSCULAR; INTRAVENOUS at 22:33

## 2020-01-01 RX ADMIN — DILTIAZEM HYDROCHLORIDE 300 MG: 180 CAPSULE, COATED, EXTENDED RELEASE ORAL at 08:05

## 2020-01-01 RX ADMIN — SENNOSIDES 17.2 MG: 8.6 TABLET, FILM COATED ORAL at 22:20

## 2020-01-01 RX ADMIN — FENTANYL CITRATE 25 MCG: 50 INJECTION, SOLUTION INTRAMUSCULAR; INTRAVENOUS at 02:00

## 2020-01-01 RX ADMIN — METHYLPREDNISOLONE SODIUM SUCCINATE 80 MG: 125 INJECTION, POWDER, FOR SOLUTION INTRAMUSCULAR; INTRAVENOUS at 18:46

## 2020-01-01 RX ADMIN — Medication 10 ML: at 13:37

## 2020-01-01 RX ADMIN — ACETAMINOPHEN 650 MG: 325 TABLET ORAL at 22:00

## 2020-01-01 RX ADMIN — GUAIFENESIN 1200 MG: 600 TABLET, EXTENDED RELEASE ORAL at 07:58

## 2020-01-01 RX ADMIN — BRIMONIDINE TARTRATE 1 DROP: 2 SOLUTION OPHTHALMIC at 18:52

## 2020-01-01 RX ADMIN — ARFORMOTEROL TARTRATE 15 MCG: 15 SOLUTION RESPIRATORY (INHALATION) at 19:31

## 2020-01-01 RX ADMIN — ATORVASTATIN CALCIUM 40 MG: 20 TABLET, FILM COATED ORAL at 21:53

## 2020-01-01 RX ADMIN — Medication 10 ML: at 14:47

## 2020-01-01 RX ADMIN — DIAZEPAM 2.5 MG: 5 TABLET ORAL at 22:00

## 2020-01-01 RX ADMIN — PRAZOSIN HYDROCHLORIDE 4 MG: 1 CAPSULE ORAL at 21:00

## 2020-01-01 RX ADMIN — TRAMADOL HYDROCHLORIDE 50 MG: 50 TABLET, FILM COATED ORAL at 18:35

## 2020-01-01 RX ADMIN — BRIMONIDINE TARTRATE 1 DROP: 2 SOLUTION OPHTHALMIC at 08:00

## 2020-01-01 RX ADMIN — IPRATROPIUM BROMIDE AND ALBUTEROL SULFATE 3 ML: .5; 3 SOLUTION RESPIRATORY (INHALATION) at 19:49

## 2020-01-01 RX ADMIN — DIAZEPAM 2.5 MG: 5 TABLET ORAL at 21:53

## 2020-01-01 RX ADMIN — BUDESONIDE 500 MCG: 0.5 INHALANT RESPIRATORY (INHALATION) at 07:16

## 2020-01-01 RX ADMIN — BUMETANIDE 1 MG: 0.25 INJECTION INTRAMUSCULAR; INTRAVENOUS at 09:15

## 2020-01-01 RX ADMIN — BRIMONIDINE TARTRATE 1 DROP: 2 SOLUTION OPHTHALMIC at 09:00

## 2020-01-01 RX ADMIN — DORZOLAMIDE HYDROCHLORIDE 1 DROP: 20 SOLUTION/ DROPS OPHTHALMIC at 15:48

## 2020-01-01 RX ADMIN — DIAZEPAM 2.5 MG: 5 TABLET ORAL at 10:04

## 2020-01-01 RX ADMIN — SODIUM CHLORIDE 125 ML/HR: 900 INJECTION, SOLUTION INTRAVENOUS at 01:22

## 2020-01-01 RX ADMIN — Medication 10 ML: at 14:00

## 2020-01-01 RX ADMIN — PANTOPRAZOLE SODIUM 40 MG: 40 TABLET, DELAYED RELEASE ORAL at 06:19

## 2020-01-01 RX ADMIN — DILTIAZEM HYDROCHLORIDE 300 MG: 180 CAPSULE, COATED, EXTENDED RELEASE ORAL at 08:06

## 2020-01-01 RX ADMIN — BUDESONIDE 500 MCG: 0.5 INHALANT RESPIRATORY (INHALATION) at 20:44

## 2020-01-01 RX ADMIN — GUAIFENESIN 1200 MG: 600 TABLET, EXTENDED RELEASE ORAL at 18:14

## 2020-01-01 RX ADMIN — FLUOXETINE 60 MG: 20 CAPSULE ORAL at 10:02

## 2020-01-01 RX ADMIN — APIXABAN 5 MG: 5 TABLET, FILM COATED ORAL at 08:07

## 2020-01-01 RX ADMIN — BRIMONIDINE TARTRATE 1 DROP: 2 SOLUTION OPHTHALMIC at 08:51

## 2020-01-01 RX ADMIN — DILTIAZEM HYDROCHLORIDE 300 MG: 180 CAPSULE, COATED, EXTENDED RELEASE ORAL at 08:48

## 2020-01-01 RX ADMIN — ARFORMOTEROL TARTRATE 15 MCG: 15 SOLUTION RESPIRATORY (INHALATION) at 20:48

## 2020-01-01 RX ADMIN — PANTOPRAZOLE SODIUM 40 MG: 40 TABLET, DELAYED RELEASE ORAL at 06:11

## 2020-01-01 RX ADMIN — METHYLPREDNISOLONE SODIUM SUCCINATE 60 MG: 125 INJECTION, POWDER, FOR SOLUTION INTRAMUSCULAR; INTRAVENOUS at 16:48

## 2020-01-01 RX ADMIN — KETOROLAC TROMETHAMINE 30 MG: 30 INJECTION, SOLUTION INTRAMUSCULAR at 12:25

## 2020-01-01 RX ADMIN — ACETAMINOPHEN 650 MG: 325 TABLET ORAL at 06:59

## 2020-01-01 RX ADMIN — BUDESONIDE 500 MCG: 0.5 INHALANT RESPIRATORY (INHALATION) at 08:00

## 2020-01-01 RX ADMIN — SODIUM CHLORIDE 1000 ML: 900 INJECTION, SOLUTION INTRAVENOUS at 22:24

## 2020-01-01 RX ADMIN — MAGNESIUM SULFATE HEPTAHYDRATE 2 G: 40 INJECTION, SOLUTION INTRAVENOUS at 09:24

## 2020-01-01 RX ADMIN — APIXABAN 5 MG: 5 TABLET, FILM COATED ORAL at 08:41

## 2020-01-01 RX ADMIN — POLYETHYLENE GLYCOL 3350 17 G: 17 POWDER, FOR SOLUTION ORAL at 17:00

## 2020-01-01 RX ADMIN — BUDESONIDE 500 MCG: 0.5 INHALANT RESPIRATORY (INHALATION) at 20:46

## 2020-01-01 RX ADMIN — ACETAMINOPHEN 650 MG: 325 TABLET ORAL at 06:18

## 2020-01-01 RX ADMIN — FLUOXETINE 60 MG: 20 CAPSULE ORAL at 08:37

## 2020-01-01 RX ADMIN — BRIMONIDINE TARTRATE 1 DROP: 2 SOLUTION OPHTHALMIC at 17:18

## 2020-01-01 RX ADMIN — METHYLPREDNISOLONE SODIUM SUCCINATE 40 MG: 40 INJECTION, POWDER, FOR SOLUTION INTRAMUSCULAR; INTRAVENOUS at 14:15

## 2020-01-01 RX ADMIN — BRIMONIDINE TARTRATE 1 DROP: 2 SOLUTION OPHTHALMIC at 15:15

## 2020-01-01 RX ADMIN — Medication 10 ML: at 22:00

## 2020-01-01 RX ADMIN — METHYLPREDNISOLONE SODIUM SUCCINATE 60 MG: 125 INJECTION, POWDER, FOR SOLUTION INTRAMUSCULAR; INTRAVENOUS at 04:23

## 2020-01-01 RX ADMIN — BRIMONIDINE TARTRATE 1 DROP: 2 SOLUTION OPHTHALMIC at 08:10

## 2020-01-01 RX ADMIN — METHYLPREDNISOLONE SODIUM SUCCINATE 60 MG: 125 INJECTION, POWDER, FOR SOLUTION INTRAMUSCULAR; INTRAVENOUS at 05:43

## 2020-01-01 RX ADMIN — MELATONIN TAB 3 MG 9 MG: 3 TAB at 22:30

## 2020-01-01 RX ADMIN — ARFORMOTEROL TARTRATE 15 MCG: 15 SOLUTION RESPIRATORY (INHALATION) at 08:01

## 2020-01-01 RX ADMIN — TRAMADOL HYDROCHLORIDE 50 MG: 50 TABLET, FILM COATED ORAL at 12:10

## 2020-01-01 RX ADMIN — DIAZEPAM 5 MG: 5 INJECTION, SOLUTION INTRAMUSCULAR; INTRAVENOUS at 17:27

## 2020-01-01 RX ADMIN — GUAIFENESIN 1200 MG: 600 TABLET, EXTENDED RELEASE ORAL at 18:06

## 2020-01-01 RX ADMIN — DIAZEPAM 2.5 MG: 5 TABLET ORAL at 06:47

## 2020-01-01 RX ADMIN — BUDESONIDE 500 MCG: 0.5 INHALANT RESPIRATORY (INHALATION) at 20:48

## 2020-01-01 RX ADMIN — BUDESONIDE 500 MCG: 0.5 INHALANT RESPIRATORY (INHALATION) at 19:31

## 2020-01-01 RX ADMIN — DIAZEPAM 2.5 MG: 5 TABLET ORAL at 08:39

## 2020-01-01 RX ADMIN — IPRATROPIUM BROMIDE AND ALBUTEROL SULFATE 3 ML: .5; 3 SOLUTION RESPIRATORY (INHALATION) at 07:46

## 2020-01-01 RX ADMIN — DIAZEPAM 2.5 MG: 5 TABLET ORAL at 09:10

## 2020-01-01 RX ADMIN — ZIPRASIDONE HYDROCHLORIDE 40 MG: 20 CAPSULE ORAL at 11:05

## 2020-01-01 RX ADMIN — ACETAMINOPHEN 650 MG: 325 TABLET ORAL at 21:53

## 2020-01-01 RX ADMIN — ARFORMOTEROL TARTRATE 15 MCG: 15 SOLUTION RESPIRATORY (INHALATION) at 08:00

## 2020-01-01 RX ADMIN — METHYLPREDNISOLONE SODIUM SUCCINATE 60 MG: 125 INJECTION, POWDER, FOR SOLUTION INTRAMUSCULAR; INTRAVENOUS at 06:12

## 2020-01-01 RX ADMIN — ARFORMOTEROL TARTRATE 15 MCG: 15 SOLUTION RESPIRATORY (INHALATION) at 20:55

## 2020-01-01 RX ADMIN — DIAZEPAM 2.5 MG: 5 TABLET ORAL at 14:15

## 2020-01-01 RX ADMIN — METHYLPREDNISOLONE SODIUM SUCCINATE 60 MG: 125 INJECTION, POWDER, FOR SOLUTION INTRAMUSCULAR; INTRAVENOUS at 06:02

## 2020-01-01 RX ADMIN — GUAIFENESIN 1200 MG: 600 TABLET, EXTENDED RELEASE ORAL at 18:28

## 2020-01-01 RX ADMIN — AZITHROMYCIN DIHYDRATE 500 MG: 500 INJECTION, POWDER, LYOPHILIZED, FOR SOLUTION INTRAVENOUS at 22:47

## 2020-01-01 RX ADMIN — DIAZEPAM 2.5 MG: 5 TABLET ORAL at 22:31

## 2020-01-01 RX ADMIN — ZIPRASIDONE HYDROCHLORIDE 40 MG: 20 CAPSULE ORAL at 09:18

## 2020-01-01 RX ADMIN — ARFORMOTEROL TARTRATE 15 MCG: 15 SOLUTION RESPIRATORY (INHALATION) at 20:22

## 2020-01-01 RX ADMIN — IPRATROPIUM BROMIDE AND ALBUTEROL SULFATE 3 ML: .5; 3 SOLUTION RESPIRATORY (INHALATION) at 15:02

## 2020-01-01 RX ADMIN — DIAZEPAM 2.5 MG: 5 TABLET ORAL at 21:55

## 2020-01-01 RX ADMIN — GUAIFENESIN 1200 MG: 600 TABLET, EXTENDED RELEASE ORAL at 09:49

## 2020-01-01 RX ADMIN — SODIUM CHLORIDE 1000 ML: 900 INJECTION, SOLUTION INTRAVENOUS at 22:13

## 2020-01-01 RX ADMIN — ZIPRASIDONE HYDROCHLORIDE 40 MG: 20 CAPSULE ORAL at 08:41

## 2020-01-01 RX ADMIN — TRAMADOL HYDROCHLORIDE 50 MG: 50 TABLET, FILM COATED ORAL at 22:10

## 2020-01-01 RX ADMIN — IPRATROPIUM BROMIDE AND ALBUTEROL SULFATE 3 ML: .5; 3 SOLUTION RESPIRATORY (INHALATION) at 23:54

## 2020-01-01 RX ADMIN — ASPIRIN 81 MG: 81 TABLET, COATED ORAL at 08:58

## 2020-01-01 RX ADMIN — IPRATROPIUM BROMIDE AND ALBUTEROL SULFATE 3 ML: .5; 3 SOLUTION RESPIRATORY (INHALATION) at 11:51

## 2020-01-01 RX ADMIN — METHYLPREDNISOLONE SODIUM SUCCINATE 40 MG: 40 INJECTION, POWDER, FOR SOLUTION INTRAMUSCULAR; INTRAVENOUS at 22:00

## 2020-01-01 RX ADMIN — DORZOLAMIDE HYDROCHLORIDE 1 DROP: 20 SOLUTION/ DROPS OPHTHALMIC at 08:39

## 2020-01-01 RX ADMIN — PANTOPRAZOLE SODIUM 40 MG: 40 TABLET, DELAYED RELEASE ORAL at 05:43

## 2020-01-01 RX ADMIN — MELATONIN TAB 3 MG 9 MG: 3 TAB at 21:59

## 2020-01-01 RX ADMIN — IPRATROPIUM BROMIDE AND ALBUTEROL SULFATE 3 ML: .5; 3 SOLUTION RESPIRATORY (INHALATION) at 03:47

## 2020-01-01 RX ADMIN — LORAZEPAM 1 MG: 2 INJECTION INTRAMUSCULAR; INTRAVENOUS at 13:04

## 2020-01-01 RX ADMIN — CEFTRIAXONE 2 G: 2 INJECTION, POWDER, FOR SOLUTION INTRAMUSCULAR; INTRAVENOUS at 22:40

## 2020-01-01 RX ADMIN — HYDROMORPHONE HYDROCHLORIDE 1 MG: 2 INJECTION, SOLUTION INTRAMUSCULAR; INTRAVENOUS; SUBCUTANEOUS at 23:16

## 2020-01-01 RX ADMIN — DIAZEPAM 5 MG: 5 INJECTION, SOLUTION INTRAMUSCULAR; INTRAVENOUS at 06:09

## 2020-01-01 RX ADMIN — Medication 10 ML: at 21:11

## 2020-01-01 RX ADMIN — DORZOLAMIDE HYDROCHLORIDE 1 DROP: 20 SOLUTION/ DROPS OPHTHALMIC at 17:19

## 2020-01-01 RX ADMIN — POLYETHYLENE GLYCOL 3350 17 G: 17 POWDER, FOR SOLUTION ORAL at 09:51

## 2020-01-01 RX ADMIN — IPRATROPIUM BROMIDE AND ALBUTEROL SULFATE 3 ML: .5; 3 SOLUTION RESPIRATORY (INHALATION) at 03:53

## 2020-01-01 RX ADMIN — LORAZEPAM 1 MG: 2 INJECTION INTRAMUSCULAR; INTRAVENOUS at 17:37

## 2020-01-01 RX ADMIN — Medication 10 ML: at 06:19

## 2020-01-01 RX ADMIN — ARFORMOTEROL TARTRATE 15 MCG: 15 SOLUTION RESPIRATORY (INHALATION) at 07:34

## 2020-01-01 RX ADMIN — DOXYCYCLINE 100 MG: 100 INJECTION, POWDER, LYOPHILIZED, FOR SOLUTION INTRAVENOUS at 08:45

## 2020-01-01 RX ADMIN — DIAZEPAM 2.5 MG: 5 TABLET ORAL at 22:17

## 2020-01-01 RX ADMIN — BUDESONIDE 500 MCG: 0.5 INHALANT RESPIRATORY (INHALATION) at 07:34

## 2020-01-01 RX ADMIN — ARFORMOTEROL TARTRATE 15 MCG: 15 SOLUTION RESPIRATORY (INHALATION) at 20:09

## 2020-01-01 RX ADMIN — Medication 10 ML: at 04:15

## 2020-01-01 RX ADMIN — TRAMADOL HYDROCHLORIDE 50 MG: 50 TABLET, FILM COATED ORAL at 16:04

## 2020-01-01 RX ADMIN — DORZOLAMIDE HYDROCHLORIDE 1 DROP: 20 SOLUTION/ DROPS OPHTHALMIC at 22:08

## 2020-01-01 RX ADMIN — IPRATROPIUM BROMIDE AND ALBUTEROL SULFATE 3 ML: .5; 3 SOLUTION RESPIRATORY (INHALATION) at 15:09

## 2020-01-01 RX ADMIN — Medication 10 ML: at 05:43

## 2020-01-01 RX ADMIN — TRAMADOL HYDROCHLORIDE 50 MG: 50 TABLET, FILM COATED ORAL at 07:22

## 2020-01-01 RX ADMIN — PANTOPRAZOLE SODIUM 40 MG: 40 TABLET, DELAYED RELEASE ORAL at 04:36

## 2020-01-01 RX ADMIN — FENTANYL CITRATE 25 MCG: 50 INJECTION, SOLUTION INTRAMUSCULAR; INTRAVENOUS at 11:48

## 2020-01-01 RX ADMIN — DORZOLAMIDE HYDROCHLORIDE 1 DROP: 20 SOLUTION/ DROPS OPHTHALMIC at 21:11

## 2020-01-01 RX ADMIN — IPRATROPIUM BROMIDE AND ALBUTEROL SULFATE 3 ML: .5; 3 SOLUTION RESPIRATORY (INHALATION) at 03:24

## 2020-01-01 RX ADMIN — ASPIRIN 81 MG: 81 TABLET, COATED ORAL at 09:15

## 2020-01-01 RX ADMIN — IPRATROPIUM BROMIDE AND ALBUTEROL SULFATE 3 ML: .5; 3 SOLUTION RESPIRATORY (INHALATION) at 08:08

## 2020-01-01 RX ADMIN — DILTIAZEM HYDROCHLORIDE 10 MG: 5 INJECTION INTRAVENOUS at 02:41

## 2020-01-01 RX ADMIN — ACETAMINOPHEN 650 MG: 325 TABLET ORAL at 20:56

## 2020-01-01 RX ADMIN — METHYLPREDNISOLONE SODIUM SUCCINATE 60 MG: 40 INJECTION, POWDER, FOR SOLUTION INTRAMUSCULAR; INTRAVENOUS at 11:49

## 2020-01-01 RX ADMIN — IPRATROPIUM BROMIDE AND ALBUTEROL SULFATE 3 ML: .5; 3 SOLUTION RESPIRATORY (INHALATION) at 23:49

## 2020-01-01 RX ADMIN — DORZOLAMIDE HYDROCHLORIDE 1 DROP: 20 SOLUTION/ DROPS OPHTHALMIC at 22:06

## 2020-01-01 RX ADMIN — POLYETHYLENE GLYCOL 3350 17 G: 17 POWDER, FOR SOLUTION ORAL at 09:08

## 2020-01-01 RX ADMIN — HYDROMORPHONE HYDROCHLORIDE 1 MG: 2 INJECTION, SOLUTION INTRAMUSCULAR; INTRAVENOUS; SUBCUTANEOUS at 17:37

## 2020-01-01 RX ADMIN — Medication 10 ML: at 15:47

## 2020-01-01 RX ADMIN — ACETAMINOPHEN 1000 MG: 500 TABLET ORAL at 22:36

## 2020-01-01 RX ADMIN — FLUOXETINE 60 MG: 20 CAPSULE ORAL at 08:38

## 2020-01-01 RX ADMIN — DOXYCYCLINE 100 MG: 100 INJECTION, POWDER, LYOPHILIZED, FOR SOLUTION INTRAVENOUS at 07:46

## 2020-01-01 RX ADMIN — DILTIAZEM HYDROCHLORIDE 180 MG: 180 CAPSULE, COATED, EXTENDED RELEASE ORAL at 09:11

## 2020-01-01 RX ADMIN — DIAZEPAM 2.5 MG: 5 TABLET ORAL at 22:01

## 2020-01-01 RX ADMIN — ACETAMINOPHEN 650 MG: 325 TABLET ORAL at 17:05

## 2020-01-01 RX ADMIN — APIXABAN 5 MG: 5 TABLET, FILM COATED ORAL at 09:10

## 2020-01-01 RX ADMIN — FENTANYL CITRATE 25 MCG: 50 INJECTION, SOLUTION INTRAMUSCULAR; INTRAVENOUS at 06:09

## 2020-01-01 RX ADMIN — IPRATROPIUM BROMIDE AND ALBUTEROL SULFATE 3 ML: .5; 3 SOLUTION RESPIRATORY (INHALATION) at 11:08

## 2020-01-01 RX ADMIN — METHYLPREDNISOLONE SODIUM SUCCINATE 60 MG: 40 INJECTION, POWDER, FOR SOLUTION INTRAMUSCULAR; INTRAVENOUS at 12:09

## 2020-01-01 RX ADMIN — ZIPRASIDONE HYDROCHLORIDE 40 MG: 20 CAPSULE ORAL at 17:24

## 2020-01-01 RX ADMIN — BUDESONIDE 500 MCG: 0.5 INHALANT RESPIRATORY (INHALATION) at 07:08

## 2020-01-01 RX ADMIN — DORZOLAMIDE HYDROCHLORIDE 1 DROP: 20 SOLUTION/ DROPS OPHTHALMIC at 17:25

## 2020-01-01 RX ADMIN — ZIPRASIDONE HYDROCHLORIDE 40 MG: 20 CAPSULE ORAL at 07:47

## 2020-01-01 RX ADMIN — IPRATROPIUM BROMIDE AND ALBUTEROL SULFATE 3 ML: .5; 3 SOLUTION RESPIRATORY (INHALATION) at 07:29

## 2020-01-01 RX ADMIN — SODIUM CHLORIDE 500 ML: 900 INJECTION, SOLUTION INTRAVENOUS at 11:11

## 2020-01-01 RX ADMIN — METHYLPREDNISOLONE SODIUM SUCCINATE 60 MG: 125 INJECTION, POWDER, FOR SOLUTION INTRAMUSCULAR; INTRAVENOUS at 15:48

## 2020-01-01 RX ADMIN — POLYETHYLENE GLYCOL 3350 17 G: 17 POWDER, FOR SOLUTION ORAL at 17:19

## 2020-01-01 RX ADMIN — DOXYCYCLINE 100 MG: 100 INJECTION, POWDER, LYOPHILIZED, FOR SOLUTION INTRAVENOUS at 08:37

## 2020-01-01 RX ADMIN — FENTANYL CITRATE 25 MCG: 50 INJECTION, SOLUTION INTRAMUSCULAR; INTRAVENOUS at 19:44

## 2020-01-01 RX ADMIN — IPRATROPIUM BROMIDE AND ALBUTEROL SULFATE 3 ML: .5; 3 SOLUTION RESPIRATORY (INHALATION) at 00:30

## 2020-01-01 RX ADMIN — BUDESONIDE 500 MCG: 0.5 INHALANT RESPIRATORY (INHALATION) at 08:36

## 2020-01-01 RX ADMIN — Medication 10 ML: at 17:19

## 2020-01-01 RX ADMIN — IPRATROPIUM BROMIDE AND ALBUTEROL SULFATE 3 ML: .5; 3 SOLUTION RESPIRATORY (INHALATION) at 16:05

## 2020-01-01 RX ADMIN — DIAZEPAM 2.5 MG: 5 TABLET ORAL at 21:00

## 2020-01-01 RX ADMIN — DIAZEPAM 2.5 MG: 5 TABLET ORAL at 08:59

## 2020-01-01 RX ADMIN — ACETAMINOPHEN 650 MG: 325 TABLET ORAL at 10:55

## 2020-01-01 RX ADMIN — ARFORMOTEROL TARTRATE 15 MCG: 15 SOLUTION RESPIRATORY (INHALATION) at 20:10

## 2020-01-01 RX ADMIN — DILTIAZEM HYDROCHLORIDE 180 MG: 180 CAPSULE, COATED, EXTENDED RELEASE ORAL at 12:28

## 2020-01-01 RX ADMIN — DILTIAZEM HYDROCHLORIDE 180 MG: 180 CAPSULE, COATED, EXTENDED RELEASE ORAL at 06:44

## 2020-01-01 RX ADMIN — Medication 10 ML: at 06:16

## 2020-01-01 RX ADMIN — METHYLPREDNISOLONE SODIUM SUCCINATE 60 MG: 40 INJECTION, POWDER, FOR SOLUTION INTRAMUSCULAR; INTRAVENOUS at 04:35

## 2020-01-01 RX ADMIN — DORZOLAMIDE HYDROCHLORIDE 1 DROP: 20 SOLUTION/ DROPS OPHTHALMIC at 08:51

## 2020-01-01 RX ADMIN — SENNOSIDES 17.2 MG: 8.6 TABLET, FILM COATED ORAL at 22:40

## 2020-01-01 RX ADMIN — ALBUTEROL SULFATE 1 DOSE: 2.5 SOLUTION RESPIRATORY (INHALATION) at 22:42

## 2020-01-01 RX ADMIN — DILTIAZEM HYDROCHLORIDE 300 MG: 180 CAPSULE, COATED, EXTENDED RELEASE ORAL at 07:59

## 2020-01-01 RX ADMIN — GUAIFENESIN 1200 MG: 600 TABLET, EXTENDED RELEASE ORAL at 09:19

## 2020-01-01 RX ADMIN — IPRATROPIUM BROMIDE AND ALBUTEROL SULFATE 3 ML: .5; 3 SOLUTION RESPIRATORY (INHALATION) at 23:36

## 2020-01-01 RX ADMIN — BUDESONIDE 500 MCG: 0.5 INHALANT RESPIRATORY (INHALATION) at 20:22

## 2020-01-01 RX ADMIN — BRIMONIDINE TARTRATE 1 DROP: 2 SOLUTION OPHTHALMIC at 18:18

## 2020-01-01 RX ADMIN — FENTANYL CITRATE 25 MCG: 50 INJECTION, SOLUTION INTRAMUSCULAR; INTRAVENOUS at 12:05

## 2020-01-01 RX ADMIN — BRIMONIDINE TARTRATE 1 DROP: 2 SOLUTION OPHTHALMIC at 08:42

## 2020-01-01 RX ADMIN — BRIMONIDINE TARTRATE 1 DROP: 2 SOLUTION OPHTHALMIC at 21:50

## 2020-01-01 RX ADMIN — IPRATROPIUM BROMIDE AND ALBUTEROL SULFATE 3 ML: .5; 3 SOLUTION RESPIRATORY (INHALATION) at 04:09

## 2020-01-01 RX ADMIN — DORZOLAMIDE HYDROCHLORIDE 1 DROP: 20 SOLUTION/ DROPS OPHTHALMIC at 21:50

## 2020-01-01 RX ADMIN — DIAZEPAM 2.5 MG: 5 TABLET ORAL at 14:05

## 2020-01-01 RX ADMIN — DORZOLAMIDE HYDROCHLORIDE 1 DROP: 20 SOLUTION/ DROPS OPHTHALMIC at 16:00

## 2020-01-01 RX ADMIN — Medication 10 ML: at 06:11

## 2020-01-01 RX ADMIN — MELATONIN TAB 3 MG 9 MG: 3 TAB at 21:55

## 2020-01-01 RX ADMIN — TRAMADOL HYDROCHLORIDE 50 MG: 50 TABLET, FILM COATED ORAL at 11:49

## 2020-01-01 RX ADMIN — BRIMONIDINE TARTRATE 1 DROP: 2 SOLUTION OPHTHALMIC at 21:14

## 2020-01-01 RX ADMIN — DORZOLAMIDE HYDROCHLORIDE 1 DROP: 20 SOLUTION/ DROPS OPHTHALMIC at 10:16

## 2020-01-01 RX ADMIN — ACETAMINOPHEN 650 MG: 325 TABLET ORAL at 18:48

## 2020-01-01 RX ADMIN — ARFORMOTEROL TARTRATE 15 MCG: 15 SOLUTION RESPIRATORY (INHALATION) at 07:08

## 2020-01-01 RX ADMIN — TRAMADOL HYDROCHLORIDE 50 MG: 50 TABLET, FILM COATED ORAL at 01:34

## 2020-01-01 RX ADMIN — ARFORMOTEROL TARTRATE 15 MCG: 15 SOLUTION RESPIRATORY (INHALATION) at 07:55

## 2020-01-01 RX ADMIN — METHYLPREDNISOLONE SODIUM SUCCINATE 80 MG: 125 INJECTION, POWDER, FOR SOLUTION INTRAMUSCULAR; INTRAVENOUS at 17:19

## 2020-01-01 RX ADMIN — IPRATROPIUM BROMIDE AND ALBUTEROL SULFATE 3 ML: .5; 3 SOLUTION RESPIRATORY (INHALATION) at 11:39

## 2020-01-01 RX ADMIN — PRAZOSIN HYDROCHLORIDE 4 MG: 1 CAPSULE ORAL at 21:53

## 2020-01-01 RX ADMIN — TRAMADOL HYDROCHLORIDE 50 MG: 50 TABLET, FILM COATED ORAL at 22:30

## 2020-01-01 RX ADMIN — DIAZEPAM 2.5 MG: 5 TABLET ORAL at 14:43

## 2020-01-01 RX ADMIN — BRIMONIDINE TARTRATE 1 DROP: 2 SOLUTION OPHTHALMIC at 10:16

## 2020-01-01 RX ADMIN — DIAZEPAM 2.5 MG: 5 TABLET ORAL at 08:38

## 2020-01-01 RX ADMIN — DIAZEPAM 2.5 MG: 5 TABLET ORAL at 09:19

## 2020-01-01 RX ADMIN — Medication 10 ML: at 06:43

## 2020-01-01 RX ADMIN — ACETAMINOPHEN 650 MG: 325 TABLET ORAL at 17:24

## 2020-01-01 RX ADMIN — PRAZOSIN HYDROCHLORIDE 4 MG: 1 CAPSULE ORAL at 22:09

## 2020-01-01 RX ADMIN — GUAIFENESIN 1200 MG: 600 TABLET, EXTENDED RELEASE ORAL at 16:04

## 2020-01-01 RX ADMIN — PRAZOSIN HYDROCHLORIDE 4 MG: 1 CAPSULE ORAL at 22:03

## 2020-01-01 RX ADMIN — BUMETANIDE 1 MG: 0.25 INJECTION INTRAMUSCULAR; INTRAVENOUS at 09:12

## 2020-01-01 RX ADMIN — METHYLPREDNISOLONE SODIUM SUCCINATE 60 MG: 125 INJECTION, POWDER, FOR SOLUTION INTRAMUSCULAR; INTRAVENOUS at 11:10

## 2020-01-01 RX ADMIN — DOXYCYCLINE 100 MG: 100 INJECTION, POWDER, LYOPHILIZED, FOR SOLUTION INTRAVENOUS at 08:26

## 2020-01-01 RX ADMIN — IPRATROPIUM BROMIDE AND ALBUTEROL SULFATE 3 ML: .5; 3 SOLUTION RESPIRATORY (INHALATION) at 08:31

## 2020-01-01 RX ADMIN — SENNOSIDES 17.2 MG: 8.6 TABLET, FILM COATED ORAL at 21:56

## 2020-01-01 RX ADMIN — ACETAMINOPHEN 650 MG: 325 TABLET ORAL at 02:57

## 2020-01-01 RX ADMIN — TRAMADOL HYDROCHLORIDE 50 MG: 50 TABLET, FILM COATED ORAL at 14:15

## 2020-01-01 RX ADMIN — METHYLPREDNISOLONE SODIUM SUCCINATE 60 MG: 40 INJECTION, POWDER, FOR SOLUTION INTRAMUSCULAR; INTRAVENOUS at 00:03

## 2020-01-01 RX ADMIN — APIXABAN 5 MG: 5 TABLET, FILM COATED ORAL at 21:11

## 2020-01-01 RX ADMIN — DOCUSATE SODIUM 100 MG: 100 CAPSULE, LIQUID FILLED ORAL at 08:40

## 2020-01-01 RX ADMIN — DIAZEPAM 2.5 MG: 5 TABLET ORAL at 06:39

## 2020-01-01 RX ADMIN — PRAZOSIN HYDROCHLORIDE 4 MG: 1 CAPSULE ORAL at 22:00

## 2020-01-01 RX ADMIN — IPRATROPIUM BROMIDE AND ALBUTEROL SULFATE 3 ML: .5; 3 SOLUTION RESPIRATORY (INHALATION) at 14:14

## 2020-01-01 RX ADMIN — Medication 10 ML: at 13:42

## 2020-01-01 RX ADMIN — DORZOLAMIDE HYDROCHLORIDE 1 DROP: 20 SOLUTION/ DROPS OPHTHALMIC at 18:51

## 2020-01-01 RX ADMIN — IPRATROPIUM BROMIDE AND ALBUTEROL SULFATE 3 ML: .5; 3 SOLUTION RESPIRATORY (INHALATION) at 20:41

## 2020-01-01 RX ADMIN — METHYLPREDNISOLONE SODIUM SUCCINATE 80 MG: 125 INJECTION, POWDER, FOR SOLUTION INTRAMUSCULAR; INTRAVENOUS at 18:09

## 2020-01-01 RX ADMIN — BRIMONIDINE TARTRATE 1 DROP: 2 SOLUTION OPHTHALMIC at 22:04

## 2020-01-01 RX ADMIN — DORZOLAMIDE HYDROCHLORIDE 1 DROP: 20 SOLUTION/ DROPS OPHTHALMIC at 22:35

## 2020-01-01 RX ADMIN — IPRATROPIUM BROMIDE AND ALBUTEROL SULFATE 3 ML: .5; 3 SOLUTION RESPIRATORY (INHALATION) at 20:25

## 2020-01-01 RX ADMIN — ATORVASTATIN CALCIUM 40 MG: 20 TABLET, FILM COATED ORAL at 22:30

## 2020-01-01 RX ADMIN — BRIMONIDINE TARTRATE 1 DROP: 2 SOLUTION OPHTHALMIC at 21:59

## 2020-01-01 RX ADMIN — HYDROMORPHONE HYDROCHLORIDE 1 MG: 2 INJECTION, SOLUTION INTRAMUSCULAR; INTRAVENOUS; SUBCUTANEOUS at 20:46

## 2020-01-01 RX ADMIN — SODIUM CHLORIDE 125 ML/HR: 900 INJECTION, SOLUTION INTRAVENOUS at 03:13

## 2020-01-01 RX ADMIN — ALBUTEROL SULFATE 1 DOSE: 2.5 SOLUTION RESPIRATORY (INHALATION) at 22:51

## 2020-01-01 RX ADMIN — SENNOSIDES 17.2 MG: 8.6 TABLET, FILM COATED ORAL at 21:20

## 2020-01-01 RX ADMIN — IPRATROPIUM BROMIDE AND ALBUTEROL SULFATE 3 ML: .5; 3 SOLUTION RESPIRATORY (INHALATION) at 03:26

## 2020-01-01 RX ADMIN — Medication 10 ML: at 16:01

## 2020-01-01 RX ADMIN — POLYETHYLENE GLYCOL 3350 17 G: 17 POWDER, FOR SOLUTION ORAL at 17:45

## 2020-01-01 RX ADMIN — ARFORMOTEROL TARTRATE 15 MCG: 15 SOLUTION RESPIRATORY (INHALATION) at 20:44

## 2020-01-01 RX ADMIN — BUDESONIDE 500 MCG: 0.5 INHALANT RESPIRATORY (INHALATION) at 08:01

## 2020-01-01 RX ADMIN — METHYLPREDNISOLONE SODIUM SUCCINATE 80 MG: 125 INJECTION, POWDER, FOR SOLUTION INTRAMUSCULAR; INTRAVENOUS at 06:19

## 2020-01-01 RX ADMIN — DIAZEPAM 2.5 MG: 5 TABLET ORAL at 14:52

## 2020-01-01 RX ADMIN — ARFORMOTEROL TARTRATE 15 MCG: 15 SOLUTION RESPIRATORY (INHALATION) at 21:07

## 2020-01-01 RX ADMIN — ARFORMOTEROL TARTRATE 15 MCG: 15 SOLUTION RESPIRATORY (INHALATION) at 20:29

## 2020-01-01 RX ADMIN — ZIPRASIDONE HYDROCHLORIDE 40 MG: 20 CAPSULE ORAL at 18:46

## 2020-01-01 RX ADMIN — ONDANSETRON 4 MG: 2 INJECTION INTRAMUSCULAR; INTRAVENOUS at 13:23

## 2020-01-01 RX ADMIN — METHYLPREDNISOLONE SODIUM SUCCINATE 80 MG: 125 INJECTION, POWDER, FOR SOLUTION INTRAMUSCULAR; INTRAVENOUS at 10:56

## 2020-01-01 RX ADMIN — ACETAMINOPHEN 650 MG: 325 TABLET ORAL at 12:00

## 2020-01-01 RX ADMIN — ZIPRASIDONE HYDROCHLORIDE 40 MG: 20 CAPSULE ORAL at 08:39

## 2020-01-01 RX ADMIN — APIXABAN 5 MG: 5 TABLET, FILM COATED ORAL at 22:00

## 2020-01-01 RX ADMIN — TRAMADOL HYDROCHLORIDE 50 MG: 50 TABLET, FILM COATED ORAL at 02:31

## 2020-01-01 RX ADMIN — IPRATROPIUM BROMIDE AND ALBUTEROL SULFATE 3 ML: .5; 3 SOLUTION RESPIRATORY (INHALATION) at 23:13

## 2020-01-01 RX ADMIN — DIAZEPAM 2.5 MG: 5 TABLET ORAL at 13:41

## 2020-01-01 RX ADMIN — IPRATROPIUM BROMIDE AND ALBUTEROL SULFATE 3 ML: .5; 3 SOLUTION RESPIRATORY (INHALATION) at 07:49

## 2020-01-01 RX ADMIN — METOPROLOL TARTRATE 50 MG: 50 TABLET, FILM COATED ORAL at 08:38

## 2020-01-01 RX ADMIN — APIXABAN 5 MG: 5 TABLET, FILM COATED ORAL at 21:53

## 2020-01-01 RX ADMIN — IPRATROPIUM BROMIDE AND ALBUTEROL SULFATE 3 ML: .5; 3 SOLUTION RESPIRATORY (INHALATION) at 00:58

## 2020-01-01 RX ADMIN — DIAZEPAM 2.5 MG: 5 TABLET ORAL at 21:13

## 2020-01-01 RX ADMIN — IPRATROPIUM BROMIDE AND ALBUTEROL SULFATE 3 ML: .5; 3 SOLUTION RESPIRATORY (INHALATION) at 23:05

## 2020-01-01 RX ADMIN — METHYLPREDNISOLONE SODIUM SUCCINATE 80 MG: 125 INJECTION, POWDER, FOR SOLUTION INTRAMUSCULAR; INTRAVENOUS at 22:10

## 2020-01-01 RX ADMIN — DOXYCYCLINE 100 MG: 100 INJECTION, POWDER, LYOPHILIZED, FOR SOLUTION INTRAVENOUS at 21:51

## 2020-01-01 RX ADMIN — BUDESONIDE 500 MCG: 0.5 INHALANT RESPIRATORY (INHALATION) at 10:20

## 2020-01-01 RX ADMIN — Medication 10 ML: at 22:01

## 2020-01-01 RX ADMIN — BRIMONIDINE TARTRATE 1 DROP: 2 SOLUTION OPHTHALMIC at 21:56

## 2020-01-01 RX ADMIN — METHYLPREDNISOLONE SODIUM SUCCINATE 40 MG: 40 INJECTION, POWDER, FOR SOLUTION INTRAMUSCULAR; INTRAVENOUS at 13:41

## 2020-01-01 RX ADMIN — METHYLPREDNISOLONE SODIUM SUCCINATE 40 MG: 40 INJECTION, POWDER, FOR SOLUTION INTRAMUSCULAR; INTRAVENOUS at 07:21

## 2020-01-01 RX ADMIN — DIAZEPAM 2.5 MG: 5 TABLET ORAL at 09:51

## 2020-01-01 RX ADMIN — ALBUTEROL SULFATE 1 DOSE: 2.5 SOLUTION RESPIRATORY (INHALATION) at 23:00

## 2020-01-01 RX ADMIN — IPRATROPIUM BROMIDE AND ALBUTEROL SULFATE 3 ML: .5; 3 SOLUTION RESPIRATORY (INHALATION) at 07:56

## 2020-01-01 RX ADMIN — ACETAMINOPHEN 650 MG: 325 TABLET ORAL at 14:55

## 2020-01-01 RX ADMIN — IPRATROPIUM BROMIDE AND ALBUTEROL SULFATE 3 ML: .5; 3 SOLUTION RESPIRATORY (INHALATION) at 04:05

## 2020-01-01 RX ADMIN — DORZOLAMIDE HYDROCHLORIDE 1 DROP: 20 SOLUTION/ DROPS OPHTHALMIC at 16:02

## 2020-01-01 RX ADMIN — SENNOSIDES 17.2 MG: 8.6 TABLET, FILM COATED ORAL at 21:55

## 2020-01-01 RX ADMIN — IPRATROPIUM BROMIDE AND ALBUTEROL SULFATE 3 ML: .5; 3 SOLUTION RESPIRATORY (INHALATION) at 03:55

## 2020-01-01 RX ADMIN — APIXABAN 5 MG: 5 TABLET, FILM COATED ORAL at 20:57

## 2020-01-01 RX ADMIN — DOXYCYCLINE 100 MG: 100 INJECTION, POWDER, LYOPHILIZED, FOR SOLUTION INTRAVENOUS at 21:14

## 2020-01-01 RX ADMIN — ACETAMINOPHEN 650 MG: 325 TABLET ORAL at 10:05

## 2020-01-01 RX ADMIN — ZIPRASIDONE HYDROCHLORIDE 40 MG: 20 CAPSULE ORAL at 08:36

## 2020-01-01 RX ADMIN — ZIPRASIDONE HYDROCHLORIDE 40 MG: 20 CAPSULE ORAL at 18:28

## 2020-01-01 RX ADMIN — APIXABAN 5 MG: 5 TABLET, FILM COATED ORAL at 09:18

## 2020-01-01 RX ADMIN — SENNOSIDES 17.2 MG: 8.6 TABLET, FILM COATED ORAL at 20:47

## 2020-01-01 RX ADMIN — IPRATROPIUM BROMIDE AND ALBUTEROL SULFATE 3 ML: .5; 3 SOLUTION RESPIRATORY (INHALATION) at 03:29

## 2020-01-01 RX ADMIN — ZIPRASIDONE HYDROCHLORIDE 40 MG: 20 CAPSULE ORAL at 17:41

## 2020-01-01 RX ADMIN — METHYLPREDNISOLONE SODIUM SUCCINATE 60 MG: 125 INJECTION, POWDER, FOR SOLUTION INTRAMUSCULAR; INTRAVENOUS at 10:35

## 2020-01-01 RX ADMIN — BUDESONIDE 500 MCG: 0.5 INHALANT RESPIRATORY (INHALATION) at 20:30

## 2020-01-01 RX ADMIN — DORZOLAMIDE HYDROCHLORIDE 1 DROP: 20 SOLUTION/ DROPS OPHTHALMIC at 18:07

## 2020-01-01 RX ADMIN — ACETAMINOPHEN 650 MG: 325 TABLET ORAL at 13:45

## 2020-01-01 RX ADMIN — IPRATROPIUM BROMIDE AND ALBUTEROL SULFATE 3 ML: .5; 3 SOLUTION RESPIRATORY (INHALATION) at 19:24

## 2020-01-01 RX ADMIN — PANTOPRAZOLE SODIUM 40 MG: 40 TABLET, DELAYED RELEASE ORAL at 06:02

## 2020-01-01 RX ADMIN — DORZOLAMIDE HYDROCHLORIDE 1 DROP: 20 SOLUTION/ DROPS OPHTHALMIC at 09:15

## 2020-01-01 RX ADMIN — BRIMONIDINE TARTRATE 1 DROP: 2 SOLUTION OPHTHALMIC at 16:00

## 2020-01-01 RX ADMIN — Medication 10 ML: at 05:16

## 2020-01-01 RX ADMIN — BRIMONIDINE TARTRATE 1 DROP: 2 SOLUTION OPHTHALMIC at 21:24

## 2020-01-01 RX ADMIN — Medication 10 ML: at 14:15

## 2020-01-01 RX ADMIN — Medication 10 ML: at 06:36

## 2020-01-01 RX ADMIN — DORZOLAMIDE HYDROCHLORIDE 1 DROP: 20 SOLUTION/ DROPS OPHTHALMIC at 08:00

## 2020-01-01 RX ADMIN — BRIMONIDINE TARTRATE 1 DROP: 2 SOLUTION OPHTHALMIC at 22:10

## 2020-01-01 RX ADMIN — ONDANSETRON 4 MG: 2 INJECTION INTRAMUSCULAR; INTRAVENOUS at 09:20

## 2020-01-01 RX ADMIN — FENTANYL CITRATE 25 MCG: 50 INJECTION, SOLUTION INTRAMUSCULAR; INTRAVENOUS at 09:45

## 2020-01-01 RX ADMIN — IPRATROPIUM BROMIDE AND ALBUTEROL SULFATE 3 ML: .5; 3 SOLUTION RESPIRATORY (INHALATION) at 11:26

## 2020-01-01 RX ADMIN — IPRATROPIUM BROMIDE AND ALBUTEROL SULFATE 3 ML: .5; 3 SOLUTION RESPIRATORY (INHALATION) at 20:04

## 2020-01-01 RX ADMIN — ACETAMINOPHEN 650 MG: 325 TABLET ORAL at 01:40

## 2020-01-01 RX ADMIN — GUAIFENESIN 1200 MG: 600 TABLET, EXTENDED RELEASE ORAL at 08:07

## 2020-01-01 RX ADMIN — PANTOPRAZOLE SODIUM 40 MG: 40 TABLET, DELAYED RELEASE ORAL at 04:32

## 2020-01-01 RX ADMIN — METHYLPREDNISOLONE SODIUM SUCCINATE 60 MG: 125 INJECTION, POWDER, FOR SOLUTION INTRAMUSCULAR; INTRAVENOUS at 16:59

## 2020-01-01 RX ADMIN — Medication 10 ML: at 07:57

## 2020-01-01 RX ADMIN — ACETAMINOPHEN 650 MG: 325 TABLET ORAL at 05:15

## 2020-01-01 RX ADMIN — BRIMONIDINE TARTRATE 1 DROP: 2 SOLUTION OPHTHALMIC at 09:14

## 2020-01-01 RX ADMIN — APIXABAN 5 MG: 5 TABLET, FILM COATED ORAL at 20:18

## 2020-01-01 RX ADMIN — IPRATROPIUM BROMIDE AND ALBUTEROL SULFATE 3 ML: .5; 3 SOLUTION RESPIRATORY (INHALATION) at 16:25

## 2020-01-01 RX ADMIN — Medication 10 ML: at 22:09

## 2020-01-01 RX ADMIN — IPRATROPIUM BROMIDE AND ALBUTEROL SULFATE 3 ML: .5; 3 SOLUTION RESPIRATORY (INHALATION) at 07:03

## 2020-01-01 RX ADMIN — METHYLPREDNISOLONE SODIUM SUCCINATE 60 MG: 40 INJECTION, POWDER, FOR SOLUTION INTRAMUSCULAR; INTRAVENOUS at 06:39

## 2020-01-01 RX ADMIN — IPRATROPIUM BROMIDE AND ALBUTEROL SULFATE 3 ML: .5; 3 SOLUTION RESPIRATORY (INHALATION) at 15:03

## 2020-01-01 RX ADMIN — IPRATROPIUM BROMIDE AND ALBUTEROL SULFATE 3 ML: .5; 3 SOLUTION RESPIRATORY (INHALATION) at 11:22

## 2020-01-01 RX ADMIN — BUDESONIDE 500 MCG: 0.5 INHALANT RESPIRATORY (INHALATION) at 07:07

## 2020-01-01 RX ADMIN — DILTIAZEM HYDROCHLORIDE 300 MG: 180 CAPSULE, COATED, EXTENDED RELEASE ORAL at 08:38

## 2020-01-01 RX ADMIN — DOCUSATE SODIUM 100 MG: 100 CAPSULE, LIQUID FILLED ORAL at 10:55

## 2020-01-01 RX ADMIN — IPRATROPIUM BROMIDE AND ALBUTEROL SULFATE 3 ML: .5; 3 SOLUTION RESPIRATORY (INHALATION) at 11:34

## 2020-01-01 RX ADMIN — ZIPRASIDONE HYDROCHLORIDE 40 MG: 20 CAPSULE ORAL at 08:05

## 2020-01-01 RX ADMIN — ASPIRIN 81 MG: 81 TABLET, COATED ORAL at 08:27

## 2020-01-01 RX ADMIN — ZIPRASIDONE HYDROCHLORIDE 40 MG: 20 CAPSULE ORAL at 17:45

## 2020-01-01 RX ADMIN — METHYLPREDNISOLONE SODIUM SUCCINATE 80 MG: 125 INJECTION, POWDER, FOR SOLUTION INTRAMUSCULAR; INTRAVENOUS at 01:05

## 2020-01-01 RX ADMIN — APIXABAN 5 MG: 5 TABLET, FILM COATED ORAL at 21:21

## 2020-01-01 RX ADMIN — IOPAMIDOL 75 ML: 755 INJECTION, SOLUTION INTRAVENOUS at 23:36

## 2020-01-01 RX ADMIN — PANTOPRAZOLE SODIUM 40 MG: 40 TABLET, DELAYED RELEASE ORAL at 07:30

## 2020-01-01 RX ADMIN — METHYLPREDNISOLONE SODIUM SUCCINATE 60 MG: 125 INJECTION, POWDER, FOR SOLUTION INTRAMUSCULAR; INTRAVENOUS at 22:11

## 2020-01-01 RX ADMIN — DIAZEPAM 2.5 MG: 5 TABLET ORAL at 21:21

## 2020-01-01 RX ADMIN — BUDESONIDE 500 MCG: 0.5 INHALANT RESPIRATORY (INHALATION) at 07:46

## 2020-01-01 RX ADMIN — METHYLPREDNISOLONE SODIUM SUCCINATE 60 MG: 125 INJECTION, POWDER, FOR SOLUTION INTRAMUSCULAR; INTRAVENOUS at 10:55

## 2020-01-01 RX ADMIN — GUAIFENESIN 1200 MG: 600 TABLET, EXTENDED RELEASE ORAL at 08:05

## 2020-01-01 RX ADMIN — BUDESONIDE 500 MCG: 0.5 INHALANT RESPIRATORY (INHALATION) at 20:42

## 2020-01-01 RX ADMIN — FENTANYL CITRATE 25 MCG: 50 INJECTION, SOLUTION INTRAMUSCULAR; INTRAVENOUS at 11:00

## 2020-01-01 RX ADMIN — BUDESONIDE 500 MCG: 0.5 INHALANT RESPIRATORY (INHALATION) at 19:39

## 2020-01-01 RX ADMIN — DIAZEPAM 2.5 MG: 5 TABLET ORAL at 08:27

## 2020-01-01 RX ADMIN — PRAZOSIN HYDROCHLORIDE 4 MG: 1 CAPSULE ORAL at 21:12

## 2020-01-01 RX ADMIN — IPRATROPIUM BROMIDE AND ALBUTEROL SULFATE 3 ML: .5; 3 SOLUTION RESPIRATORY (INHALATION) at 07:11

## 2020-01-01 RX ADMIN — Medication 10 ML: at 04:37

## 2020-01-01 RX ADMIN — FENTANYL CITRATE 25 MCG: 50 INJECTION, SOLUTION INTRAMUSCULAR; INTRAVENOUS at 16:40

## 2020-01-01 RX ADMIN — SENNOSIDES 17.2 MG: 8.6 TABLET, FILM COATED ORAL at 21:53

## 2020-01-01 RX ADMIN — MELATONIN TAB 3 MG 9 MG: 3 TAB at 21:12

## 2020-01-01 RX ADMIN — ACETAMINOPHEN 650 MG: 325 TABLET ORAL at 18:06

## 2020-01-01 RX ADMIN — LORAZEPAM 1 MG: 2 INJECTION INTRAMUSCULAR; INTRAVENOUS at 15:59

## 2020-01-01 RX ADMIN — BRIMONIDINE TARTRATE 1 DROP: 2 SOLUTION OPHTHALMIC at 22:07

## 2020-01-01 RX ADMIN — ATORVASTATIN CALCIUM 40 MG: 20 TABLET, FILM COATED ORAL at 21:11

## 2020-01-01 RX ADMIN — BUMETANIDE 1 MG: 0.25 INJECTION INTRAMUSCULAR; INTRAVENOUS at 08:27

## 2020-01-01 RX ADMIN — METHYLPREDNISOLONE SODIUM SUCCINATE 80 MG: 125 INJECTION, POWDER, FOR SOLUTION INTRAMUSCULAR; INTRAVENOUS at 22:04

## 2020-01-01 RX ADMIN — Medication 10 ML: at 21:25

## 2020-01-01 RX ADMIN — Medication 10 ML: at 06:03

## 2020-01-01 RX ADMIN — METHYLPREDNISOLONE SODIUM SUCCINATE 40 MG: 40 INJECTION, POWDER, FOR SOLUTION INTRAMUSCULAR; INTRAVENOUS at 22:35

## 2020-01-01 RX ADMIN — GUAIFENESIN 600 MG: 600 TABLET, EXTENDED RELEASE ORAL at 08:58

## 2020-01-01 RX ADMIN — BUDESONIDE 500 MCG: 0.5 INHALANT RESPIRATORY (INHALATION) at 21:46

## 2020-01-01 RX ADMIN — ARFORMOTEROL TARTRATE 15 MCG: 15 SOLUTION RESPIRATORY (INHALATION) at 07:16

## 2020-01-01 RX ADMIN — METHYLPREDNISOLONE SODIUM SUCCINATE 60 MG: 40 INJECTION, POWDER, FOR SOLUTION INTRAMUSCULAR; INTRAVENOUS at 22:17

## 2020-01-01 RX ADMIN — IPRATROPIUM BROMIDE AND ALBUTEROL SULFATE 3 ML: .5; 3 SOLUTION RESPIRATORY (INHALATION) at 15:27

## 2020-01-01 RX ADMIN — PANTOPRAZOLE SODIUM 40 MG: 40 TABLET, DELAYED RELEASE ORAL at 06:47

## 2020-01-01 RX ADMIN — ATORVASTATIN CALCIUM 40 MG: 20 TABLET, FILM COATED ORAL at 20:56

## 2020-01-01 RX ADMIN — FLUOXETINE 60 MG: 20 CAPSULE ORAL at 09:11

## 2020-01-01 RX ADMIN — DORZOLAMIDE HYDROCHLORIDE 1 DROP: 20 SOLUTION/ DROPS OPHTHALMIC at 18:18

## 2020-01-01 RX ADMIN — HYDROMORPHONE HYDROCHLORIDE 1 MG: 2 INJECTION, SOLUTION INTRAMUSCULAR; INTRAVENOUS; SUBCUTANEOUS at 22:28

## 2020-01-01 RX ADMIN — DORZOLAMIDE HYDROCHLORIDE 1 DROP: 20 SOLUTION/ DROPS OPHTHALMIC at 21:56

## 2020-01-01 RX ADMIN — KETOROLAC TROMETHAMINE 30 MG: 30 INJECTION, SOLUTION INTRAMUSCULAR at 04:09

## 2020-01-01 RX ADMIN — METHYLPREDNISOLONE SODIUM SUCCINATE 60 MG: 125 INJECTION, POWDER, FOR SOLUTION INTRAMUSCULAR; INTRAVENOUS at 17:54

## 2020-01-01 RX ADMIN — ACETAMINOPHEN 650 MG: 325 TABLET ORAL at 19:48

## 2020-01-01 RX ADMIN — MELATONIN TAB 3 MG 9 MG: 3 TAB at 21:53

## 2020-01-01 RX ADMIN — ZIPRASIDONE HYDROCHLORIDE 40 MG: 20 CAPSULE ORAL at 18:06

## 2020-01-01 RX ADMIN — ATORVASTATIN CALCIUM 40 MG: 20 TABLET, FILM COATED ORAL at 20:47

## 2020-01-01 RX ADMIN — FLUOXETINE 60 MG: 20 CAPSULE ORAL at 09:15

## 2020-01-01 RX ADMIN — HYDROMORPHONE HYDROCHLORIDE 1 MG: 2 INJECTION, SOLUTION INTRAMUSCULAR; INTRAVENOUS; SUBCUTANEOUS at 15:59

## 2020-01-01 RX ADMIN — DIAZEPAM 2.5 MG: 5 TABLET ORAL at 07:46

## 2020-01-01 RX ADMIN — IPRATROPIUM BROMIDE AND ALBUTEROL SULFATE 3 ML: .5; 3 SOLUTION RESPIRATORY (INHALATION) at 07:50

## 2020-01-01 RX ADMIN — GUAIFENESIN 1200 MG: 600 TABLET, EXTENDED RELEASE ORAL at 08:38

## 2020-01-01 RX ADMIN — ARFORMOTEROL TARTRATE 15 MCG: 15 SOLUTION RESPIRATORY (INHALATION) at 20:46

## 2020-01-01 RX ADMIN — METOPROLOL TARTRATE 50 MG: 50 TABLET, FILM COATED ORAL at 08:58

## 2020-01-01 RX ADMIN — DIAZEPAM 2.5 MG: 5 TABLET ORAL at 20:48

## 2020-01-01 RX ADMIN — BRIMONIDINE TARTRATE 1 DROP: 2 SOLUTION OPHTHALMIC at 15:48

## 2020-01-01 RX ADMIN — FENTANYL CITRATE 25 MCG: 50 INJECTION, SOLUTION INTRAMUSCULAR; INTRAVENOUS at 22:05

## 2020-01-01 RX ADMIN — Medication 10 ML: at 15:49

## 2020-01-01 RX ADMIN — ZIPRASIDONE HYDROCHLORIDE 40 MG: 20 CAPSULE ORAL at 16:49

## 2020-01-01 RX ADMIN — BRIMONIDINE TARTRATE 1 DROP: 2 SOLUTION OPHTHALMIC at 22:08

## 2020-01-01 RX ADMIN — GUAIFENESIN 1200 MG: 600 TABLET, EXTENDED RELEASE ORAL at 09:15

## 2020-01-01 RX ADMIN — IPRATROPIUM BROMIDE AND ALBUTEROL SULFATE 3 ML: .5; 3 SOLUTION RESPIRATORY (INHALATION) at 11:50

## 2020-01-01 RX ADMIN — DIAZEPAM 5 MG: 5 INJECTION, SOLUTION INTRAMUSCULAR; INTRAVENOUS at 18:23

## 2020-01-01 RX ADMIN — DORZOLAMIDE HYDROCHLORIDE 1 DROP: 20 SOLUTION/ DROPS OPHTHALMIC at 17:45

## 2020-01-01 RX ADMIN — ONDANSETRON 4 MG: 2 INJECTION INTRAMUSCULAR; INTRAVENOUS at 08:44

## 2020-01-01 RX ADMIN — ACETAMINOPHEN 650 MG: 325 TABLET ORAL at 06:05

## 2020-01-01 RX ADMIN — METHYLPREDNISOLONE SODIUM SUCCINATE 80 MG: 125 INJECTION, POWDER, FOR SOLUTION INTRAMUSCULAR; INTRAVENOUS at 11:58

## 2020-01-01 RX ADMIN — DORZOLAMIDE HYDROCHLORIDE 1 DROP: 20 SOLUTION/ DROPS OPHTHALMIC at 08:42

## 2020-01-01 RX ADMIN — IPRATROPIUM BROMIDE AND ALBUTEROL SULFATE 3 ML: .5; 3 SOLUTION RESPIRATORY (INHALATION) at 11:00

## 2020-01-01 RX ADMIN — IPRATROPIUM BROMIDE AND ALBUTEROL SULFATE 3 ML: .5; 3 SOLUTION RESPIRATORY (INHALATION) at 20:14

## 2020-01-01 RX ADMIN — FLUOXETINE 60 MG: 20 CAPSULE ORAL at 08:27

## 2020-01-01 RX ADMIN — SODIUM CHLORIDE 109 ML: 900 INJECTION, SOLUTION INTRAVENOUS at 01:27

## 2020-01-01 RX ADMIN — DORZOLAMIDE HYDROCHLORIDE 1 DROP: 20 SOLUTION/ DROPS OPHTHALMIC at 16:48

## 2020-01-01 RX ADMIN — IPRATROPIUM BROMIDE AND ALBUTEROL SULFATE 3 ML: .5; 3 SOLUTION RESPIRATORY (INHALATION) at 03:50

## 2020-01-01 RX ADMIN — METHYLPREDNISOLONE SODIUM SUCCINATE 60 MG: 125 INJECTION, POWDER, FOR SOLUTION INTRAMUSCULAR; INTRAVENOUS at 05:16

## 2020-01-01 RX ADMIN — IPRATROPIUM BROMIDE AND ALBUTEROL SULFATE 3 ML: .5; 3 SOLUTION RESPIRATORY (INHALATION) at 12:15

## 2020-01-01 RX ADMIN — ZIPRASIDONE HYDROCHLORIDE 40 MG: 20 CAPSULE ORAL at 08:27

## 2020-01-01 RX ADMIN — ZIPRASIDONE HYDROCHLORIDE 40 MG: 20 CAPSULE ORAL at 18:14

## 2020-01-01 RX ADMIN — ARFORMOTEROL TARTRATE 15 MCG: 15 SOLUTION RESPIRATORY (INHALATION) at 08:36

## 2020-01-01 RX ADMIN — Medication 10 ML: at 16:49

## 2020-01-01 RX ADMIN — POLYETHYLENE GLYCOL 3350 17 G: 17 POWDER, FOR SOLUTION ORAL at 17:41

## 2020-01-01 RX ADMIN — Medication 10 ML: at 14:52

## 2020-01-01 RX ADMIN — TRAMADOL HYDROCHLORIDE 50 MG: 50 TABLET, FILM COATED ORAL at 08:07

## 2020-01-01 RX ADMIN — METHYLPREDNISOLONE SODIUM SUCCINATE 80 MG: 125 INJECTION, POWDER, FOR SOLUTION INTRAMUSCULAR; INTRAVENOUS at 04:15

## 2020-01-01 RX ADMIN — PANTOPRAZOLE SODIUM 40 MG: 40 TABLET, DELAYED RELEASE ORAL at 05:17

## 2020-01-01 RX ADMIN — BUDESONIDE 500 MCG: 0.5 INHALANT RESPIRATORY (INHALATION) at 07:24

## 2020-01-01 RX ADMIN — ACETAMINOPHEN 650 MG: 325 TABLET ORAL at 18:39

## 2020-01-01 RX ADMIN — DORZOLAMIDE HYDROCHLORIDE 1 DROP: 20 SOLUTION/ DROPS OPHTHALMIC at 22:00

## 2020-01-01 RX ADMIN — ONDANSETRON 4 MG: 2 INJECTION INTRAMUSCULAR; INTRAVENOUS at 01:36

## 2020-01-01 RX ADMIN — DORZOLAMIDE HYDROCHLORIDE 1 DROP: 20 SOLUTION/ DROPS OPHTHALMIC at 21:59

## 2020-01-01 RX ADMIN — DIAZEPAM 5 MG: 5 INJECTION, SOLUTION INTRAMUSCULAR; INTRAVENOUS at 12:04

## 2020-01-01 RX ADMIN — SENNOSIDES 17.2 MG: 8.6 TABLET, FILM COATED ORAL at 21:59

## 2020-01-01 RX ADMIN — FENTANYL CITRATE 25 MCG: 50 INJECTION, SOLUTION INTRAMUSCULAR; INTRAVENOUS at 00:08

## 2020-01-01 RX ADMIN — DOCUSATE SODIUM 100 MG: 100 CAPSULE, LIQUID FILLED ORAL at 13:45

## 2020-01-01 RX ADMIN — APIXABAN 5 MG: 5 TABLET, FILM COATED ORAL at 07:59

## 2020-01-01 RX ADMIN — HYDROCODONE BITARTRATE AND ACETAMINOPHEN 1 TABLET: 5; 325 TABLET ORAL at 01:38

## 2020-01-01 RX ADMIN — DORZOLAMIDE HYDROCHLORIDE 1 DROP: 20 SOLUTION/ DROPS OPHTHALMIC at 21:24

## 2020-01-01 RX ADMIN — ACETAMINOPHEN 650 MG: 325 TABLET ORAL at 10:57

## 2020-01-01 RX ADMIN — BRIMONIDINE TARTRATE 1 DROP: 2 SOLUTION OPHTHALMIC at 18:07

## 2020-01-01 RX ADMIN — ENOXAPARIN SODIUM 40 MG: 40 INJECTION SUBCUTANEOUS at 21:52

## 2020-01-01 RX ADMIN — TRAMADOL HYDROCHLORIDE 50 MG: 50 TABLET, FILM COATED ORAL at 18:14

## 2020-01-01 RX ADMIN — IPRATROPIUM BROMIDE AND ALBUTEROL SULFATE 3 ML: .5; 3 SOLUTION RESPIRATORY (INHALATION) at 15:23

## 2020-01-01 RX ADMIN — TRAMADOL HYDROCHLORIDE 50 MG: 50 TABLET, FILM COATED ORAL at 04:36

## 2020-01-01 RX ADMIN — APIXABAN 5 MG: 5 TABLET, FILM COATED ORAL at 08:05

## 2020-01-01 RX ADMIN — METHYLPREDNISOLONE SODIUM SUCCINATE 60 MG: 125 INJECTION, POWDER, FOR SOLUTION INTRAMUSCULAR; INTRAVENOUS at 11:05

## 2020-01-01 RX ADMIN — Medication 10 ML: at 22:02

## 2020-01-01 RX ADMIN — APIXABAN 5 MG: 5 TABLET, FILM COATED ORAL at 08:39

## 2020-01-01 RX ADMIN — BRIMONIDINE TARTRATE 1 DROP: 2 SOLUTION OPHTHALMIC at 08:07

## 2020-01-01 RX ADMIN — IPRATROPIUM BROMIDE AND ALBUTEROL SULFATE 3 ML: .5; 3 SOLUTION RESPIRATORY (INHALATION) at 15:11

## 2020-01-01 RX ADMIN — ARFORMOTEROL TARTRATE 15 MCG: 15 SOLUTION RESPIRATORY (INHALATION) at 08:14

## 2020-01-01 RX ADMIN — ZIPRASIDONE HYDROCHLORIDE 40 MG: 20 CAPSULE ORAL at 08:06

## 2020-01-01 RX ADMIN — IPRATROPIUM BROMIDE AND ALBUTEROL SULFATE 3 ML: .5; 3 SOLUTION RESPIRATORY (INHALATION) at 04:36

## 2020-01-01 RX ADMIN — ACETAMINOPHEN 650 MG: 325 TABLET ORAL at 13:47

## 2020-01-01 RX ADMIN — METOPROLOL TARTRATE 50 MG: 50 TABLET, FILM COATED ORAL at 17:54

## 2020-01-01 RX ADMIN — IPRATROPIUM BROMIDE AND ALBUTEROL SULFATE 3 ML: .5; 3 SOLUTION RESPIRATORY (INHALATION) at 23:59

## 2020-01-01 RX ADMIN — METHYLPREDNISOLONE SODIUM SUCCINATE 125 MG: 125 INJECTION, POWDER, FOR SOLUTION INTRAMUSCULAR; INTRAVENOUS at 22:38

## 2020-01-01 RX ADMIN — IPRATROPIUM BROMIDE AND ALBUTEROL SULFATE 3 ML: .5; 3 SOLUTION RESPIRATORY (INHALATION) at 19:23

## 2020-01-01 RX ADMIN — IPRATROPIUM BROMIDE AND ALBUTEROL SULFATE 3 ML: .5; 3 SOLUTION RESPIRATORY (INHALATION) at 04:43

## 2020-01-01 RX ADMIN — PANTOPRAZOLE SODIUM 40 MG: 40 TABLET, DELAYED RELEASE ORAL at 05:42

## 2020-01-01 RX ADMIN — METOPROLOL TARTRATE 50 MG: 50 TABLET, FILM COATED ORAL at 09:15

## 2020-01-01 RX ADMIN — IPRATROPIUM BROMIDE AND ALBUTEROL SULFATE 3 ML: .5; 3 SOLUTION RESPIRATORY (INHALATION) at 08:02

## 2020-01-01 RX ADMIN — TRAMADOL HYDROCHLORIDE 50 MG: 50 TABLET, FILM COATED ORAL at 04:16

## 2020-01-01 RX ADMIN — DIAZEPAM 2.5 MG: 5 TABLET ORAL at 21:11

## 2020-01-01 RX ADMIN — MELATONIN TAB 3 MG 9 MG: 3 TAB at 22:09

## 2020-01-01 RX ADMIN — ARFORMOTEROL TARTRATE 15 MCG: 15 SOLUTION RESPIRATORY (INHALATION) at 07:24

## 2020-01-01 RX ADMIN — ATORVASTATIN CALCIUM 40 MG: 20 TABLET, FILM COATED ORAL at 21:54

## 2020-01-01 RX ADMIN — DORZOLAMIDE HYDROCHLORIDE 1 DROP: 20 SOLUTION/ DROPS OPHTHALMIC at 22:04

## 2020-01-01 RX ADMIN — BRIMONIDINE TARTRATE 1 DROP: 2 SOLUTION OPHTHALMIC at 08:39

## 2020-01-01 RX ADMIN — APIXABAN 5 MG: 5 TABLET, FILM COATED ORAL at 09:50

## 2020-01-01 RX ADMIN — Medication 10 ML: at 06:56

## 2020-01-01 RX ADMIN — ACETAMINOPHEN 650 MG: 325 TABLET ORAL at 08:38

## 2020-01-01 RX ADMIN — MELATONIN TAB 3 MG 9 MG: 3 TAB at 21:19

## 2020-01-01 RX ADMIN — BUMETANIDE 1 MG: 0.25 INJECTION INTRAMUSCULAR; INTRAVENOUS at 08:37

## 2020-01-01 RX ADMIN — ATORVASTATIN CALCIUM 40 MG: 20 TABLET, FILM COATED ORAL at 22:00

## 2020-01-01 RX ADMIN — ZIPRASIDONE HYDROCHLORIDE 40 MG: 20 CAPSULE ORAL at 16:02

## 2020-01-01 RX ADMIN — PRAZOSIN HYDROCHLORIDE 4 MG: 1 CAPSULE ORAL at 21:54

## 2020-01-01 RX ADMIN — IPRATROPIUM BROMIDE AND ALBUTEROL SULFATE 3 ML: .5; 3 SOLUTION RESPIRATORY (INHALATION) at 20:39

## 2020-01-01 RX ADMIN — METHYLPREDNISOLONE SODIUM SUCCINATE 60 MG: 125 INJECTION, POWDER, FOR SOLUTION INTRAMUSCULAR; INTRAVENOUS at 16:12

## 2020-01-01 RX ADMIN — GUAIFENESIN 1200 MG: 600 TABLET, EXTENDED RELEASE ORAL at 09:10

## 2020-01-01 RX ADMIN — Medication 10 ML: at 22:34

## 2020-01-01 RX ADMIN — ACETAMINOPHEN 650 MG: 325 TABLET ORAL at 06:48

## 2020-01-01 RX ADMIN — IPRATROPIUM BROMIDE AND ALBUTEROL SULFATE 3 ML: .5; 3 SOLUTION RESPIRATORY (INHALATION) at 11:19

## 2020-01-01 RX ADMIN — PRAZOSIN HYDROCHLORIDE 4 MG: 1 CAPSULE ORAL at 22:30

## 2020-01-01 RX ADMIN — LORAZEPAM 1 MG: 2 INJECTION INTRAMUSCULAR; INTRAVENOUS at 22:36

## 2020-01-01 RX ADMIN — SENNOSIDES 17.2 MG: 8.6 TABLET, FILM COATED ORAL at 22:27

## 2020-01-01 RX ADMIN — ENOXAPARIN SODIUM 40 MG: 40 INJECTION SUBCUTANEOUS at 01:38

## 2020-01-01 RX ADMIN — DILTIAZEM HYDROCHLORIDE 300 MG: 180 CAPSULE, COATED, EXTENDED RELEASE ORAL at 09:50

## 2020-01-01 RX ADMIN — ZIPRASIDONE HYDROCHLORIDE 40 MG: 20 CAPSULE ORAL at 17:00

## 2020-01-01 RX ADMIN — ZIPRASIDONE HYDROCHLORIDE 40 MG: 20 CAPSULE ORAL at 07:51

## 2020-01-01 RX ADMIN — ACETAMINOPHEN 650 MG: 325 TABLET ORAL at 06:11

## 2020-01-01 RX ADMIN — METHYLPREDNISOLONE SODIUM SUCCINATE 60 MG: 125 INJECTION, POWDER, FOR SOLUTION INTRAMUSCULAR; INTRAVENOUS at 22:01

## 2020-01-01 RX ADMIN — ZIPRASIDONE HYDROCHLORIDE 40 MG: 20 CAPSULE ORAL at 09:11

## 2020-01-01 RX ADMIN — BUMETANIDE 1 MG: 0.25 INJECTION INTRAMUSCULAR; INTRAVENOUS at 08:43

## 2020-01-01 RX ADMIN — BRIMONIDINE TARTRATE 1 DROP: 2 SOLUTION OPHTHALMIC at 09:20

## 2020-01-01 RX ADMIN — PANTOPRAZOLE SODIUM 40 MG: 40 TABLET, DELAYED RELEASE ORAL at 08:58

## 2020-01-01 RX ADMIN — POLYETHYLENE GLYCOL 3350 17 G: 17 POWDER, FOR SOLUTION ORAL at 08:38

## 2020-01-01 RX ADMIN — ACETAMINOPHEN 650 MG: 325 TABLET ORAL at 03:54

## 2020-01-01 RX ADMIN — DORZOLAMIDE HYDROCHLORIDE 1 DROP: 20 SOLUTION/ DROPS OPHTHALMIC at 08:07

## 2020-01-01 RX ADMIN — IPRATROPIUM BROMIDE AND ALBUTEROL SULFATE 3 ML: .5; 3 SOLUTION RESPIRATORY (INHALATION) at 15:30

## 2020-01-01 RX ADMIN — IPRATROPIUM BROMIDE AND ALBUTEROL SULFATE 3 ML: .5; 3 SOLUTION RESPIRATORY (INHALATION) at 03:11

## 2020-01-01 RX ADMIN — GUAIFENESIN 1200 MG: 600 TABLET, EXTENDED RELEASE ORAL at 08:27

## 2020-01-01 RX ADMIN — BRIMONIDINE TARTRATE 1 DROP: 2 SOLUTION OPHTHALMIC at 17:54

## 2020-01-01 RX ADMIN — DORZOLAMIDE HYDROCHLORIDE 1 DROP: 20 SOLUTION/ DROPS OPHTHALMIC at 21:14

## 2020-01-01 RX ADMIN — METHYLPREDNISOLONE SODIUM SUCCINATE 40 MG: 40 INJECTION, POWDER, FOR SOLUTION INTRAMUSCULAR; INTRAVENOUS at 06:47

## 2020-01-01 RX ADMIN — BRIMONIDINE TARTRATE 1 DROP: 2 SOLUTION OPHTHALMIC at 22:01

## 2020-01-01 RX ADMIN — BUMETANIDE 1 MG: 0.25 INJECTION INTRAMUSCULAR; INTRAVENOUS at 17:53

## 2020-01-01 RX ADMIN — METHYLPREDNISOLONE SODIUM SUCCINATE 60 MG: 125 INJECTION, POWDER, FOR SOLUTION INTRAMUSCULAR; INTRAVENOUS at 10:13

## 2020-01-01 RX ADMIN — HYDROMORPHONE HYDROCHLORIDE 1 MG: 2 INJECTION, SOLUTION INTRAMUSCULAR; INTRAVENOUS; SUBCUTANEOUS at 14:02

## 2020-01-01 RX ADMIN — Medication 10 ML: at 22:03

## 2020-01-01 RX ADMIN — APIXABAN 5 MG: 5 TABLET, FILM COATED ORAL at 10:01

## 2020-01-01 RX ADMIN — PRAZOSIN HYDROCHLORIDE 4 MG: 1 CAPSULE ORAL at 21:19

## 2020-01-01 RX ADMIN — IPRATROPIUM BROMIDE AND ALBUTEROL SULFATE 3 ML: .5; 3 SOLUTION RESPIRATORY (INHALATION) at 11:36

## 2020-01-01 RX ADMIN — DOXYCYCLINE 100 MG: 100 INJECTION, POWDER, LYOPHILIZED, FOR SOLUTION INTRAVENOUS at 20:47

## 2020-01-01 RX ADMIN — DIAZEPAM 2.5 MG: 5 TABLET ORAL at 07:21

## 2020-01-01 RX ADMIN — MELATONIN TAB 3 MG 9 MG: 3 TAB at 22:17

## 2020-01-01 RX ADMIN — BRIMONIDINE TARTRATE 1 DROP: 2 SOLUTION OPHTHALMIC at 17:15

## 2020-01-01 RX ADMIN — ACETAMINOPHEN 650 MG: 325 TABLET ORAL at 22:04

## 2020-01-01 RX ADMIN — TRAMADOL HYDROCHLORIDE 50 MG: 50 TABLET, FILM COATED ORAL at 20:18

## 2020-01-01 RX ADMIN — GUAIFENESIN 1200 MG: 600 TABLET, EXTENDED RELEASE ORAL at 16:50

## 2020-01-01 RX ADMIN — GUAIFENESIN 1200 MG: 600 TABLET, EXTENDED RELEASE ORAL at 17:00

## 2020-01-01 RX ADMIN — SENNOSIDES 17.2 MG: 8.6 TABLET, FILM COATED ORAL at 21:14

## 2020-01-01 RX ADMIN — GUAIFENESIN 1200 MG: 600 TABLET, EXTENDED RELEASE ORAL at 17:41

## 2020-01-01 RX ADMIN — FENTANYL CITRATE 25 MCG: 50 INJECTION, SOLUTION INTRAMUSCULAR; INTRAVENOUS at 04:09

## 2020-01-01 RX ADMIN — ARFORMOTEROL TARTRATE 15 MCG: 15 SOLUTION RESPIRATORY (INHALATION) at 07:07

## 2020-01-01 RX ADMIN — BRIMONIDINE TARTRATE 1 DROP: 2 SOLUTION OPHTHALMIC at 22:35

## 2020-01-01 RX ADMIN — ATORVASTATIN CALCIUM 40 MG: 20 TABLET, FILM COATED ORAL at 20:18

## 2020-01-01 RX ADMIN — DORZOLAMIDE HYDROCHLORIDE 1 DROP: 20 SOLUTION/ DROPS OPHTHALMIC at 21:00

## 2020-01-01 RX ADMIN — Medication 10 ML: at 07:22

## 2020-01-01 RX ADMIN — POLYETHYLENE GLYCOL 3350 17 G: 17 POWDER, FOR SOLUTION ORAL at 18:07

## 2020-01-01 RX ADMIN — ARFORMOTEROL TARTRATE 15 MCG: 15 SOLUTION RESPIRATORY (INHALATION) at 10:20

## 2020-01-01 RX ADMIN — METHYLPREDNISOLONE SODIUM SUCCINATE 80 MG: 125 INJECTION, POWDER, FOR SOLUTION INTRAMUSCULAR; INTRAVENOUS at 05:43

## 2020-01-01 RX ADMIN — PANTOPRAZOLE SODIUM 40 MG: 40 TABLET, DELAYED RELEASE ORAL at 08:38

## 2020-01-01 RX ADMIN — METOPROLOL TARTRATE 50 MG: 50 TABLET, FILM COATED ORAL at 16:12

## 2020-01-01 RX ADMIN — IPRATROPIUM BROMIDE AND ALBUTEROL SULFATE 3 ML: .5; 3 SOLUTION RESPIRATORY (INHALATION) at 00:14

## 2020-01-01 RX ADMIN — PRAZOSIN HYDROCHLORIDE 4 MG: 1 CAPSULE ORAL at 21:11

## 2020-01-01 RX ADMIN — DORZOLAMIDE HYDROCHLORIDE 1 DROP: 20 SOLUTION/ DROPS OPHTHALMIC at 08:27

## 2020-01-01 RX ADMIN — ASPIRIN 81 MG: 81 TABLET, COATED ORAL at 08:38

## 2020-01-01 RX ADMIN — ONDANSETRON 4 MG: 2 INJECTION INTRAMUSCULAR; INTRAVENOUS at 13:45

## 2020-01-01 RX ADMIN — IPRATROPIUM BROMIDE AND ALBUTEROL SULFATE 3 ML: .5; 3 SOLUTION RESPIRATORY (INHALATION) at 07:01

## 2020-01-01 RX ADMIN — ENOXAPARIN SODIUM 40 MG: 40 INJECTION SUBCUTANEOUS at 22:01

## 2020-01-01 RX ADMIN — BUDESONIDE 500 MCG: 0.5 INHALANT RESPIRATORY (INHALATION) at 08:08

## 2020-01-01 RX ADMIN — ARFORMOTEROL TARTRATE 15 MCG: 15 SOLUTION RESPIRATORY (INHALATION) at 20:30

## 2020-01-01 RX ADMIN — METHYLPREDNISOLONE SODIUM SUCCINATE 60 MG: 125 INJECTION, POWDER, FOR SOLUTION INTRAMUSCULAR; INTRAVENOUS at 06:10

## 2020-01-01 RX ADMIN — DIAZEPAM 2.5 MG: 5 TABLET ORAL at 00:03

## 2020-01-01 RX ADMIN — PANTOPRAZOLE SODIUM 40 MG: 40 TABLET, DELAYED RELEASE ORAL at 07:46

## 2020-01-01 RX ADMIN — METHYLPREDNISOLONE SODIUM SUCCINATE 60 MG: 125 INJECTION, POWDER, FOR SOLUTION INTRAMUSCULAR; INTRAVENOUS at 04:48

## 2020-01-01 RX ADMIN — SODIUM CHLORIDE 15 MG/HR: 900 INJECTION, SOLUTION INTRAVENOUS at 04:37

## 2020-01-01 RX ADMIN — ARFORMOTEROL TARTRATE 15 MCG: 15 SOLUTION RESPIRATORY (INHALATION) at 11:30

## 2020-01-01 RX ADMIN — PRAZOSIN HYDROCHLORIDE 4 MG: 1 CAPSULE ORAL at 22:01

## 2020-01-01 RX ADMIN — ACETAMINOPHEN 650 MG: 325 TABLET ORAL at 08:05

## 2020-01-01 RX ADMIN — DORZOLAMIDE HYDROCHLORIDE 1 DROP: 20 SOLUTION/ DROPS OPHTHALMIC at 10:05

## 2020-01-01 RX ADMIN — FENTANYL CITRATE 25 MCG: 50 INJECTION, SOLUTION INTRAMUSCULAR; INTRAVENOUS at 08:14

## 2020-01-01 RX ADMIN — ACETAMINOPHEN 650 MG: 325 TABLET ORAL at 06:33

## 2020-01-01 RX ADMIN — DORZOLAMIDE HYDROCHLORIDE 1 DROP: 20 SOLUTION/ DROPS OPHTHALMIC at 09:00

## 2020-01-01 RX ADMIN — IPRATROPIUM BROMIDE AND ALBUTEROL SULFATE 3 ML: .5; 3 SOLUTION RESPIRATORY (INHALATION) at 08:58

## 2020-01-01 RX ADMIN — APIXABAN 5 MG: 5 TABLET, FILM COATED ORAL at 21:13

## 2020-01-01 RX ADMIN — HYDROMORPHONE HYDROCHLORIDE 1 MG: 2 INJECTION, SOLUTION INTRAMUSCULAR; INTRAVENOUS; SUBCUTANEOUS at 13:33

## 2020-01-01 RX ADMIN — POLYETHYLENE GLYCOL 3350 17 G: 17 POWDER, FOR SOLUTION ORAL at 09:18

## 2020-01-01 RX ADMIN — DORZOLAMIDE HYDROCHLORIDE 1 DROP: 20 SOLUTION/ DROPS OPHTHALMIC at 15:15

## 2020-01-01 RX ADMIN — IPRATROPIUM BROMIDE AND ALBUTEROL SULFATE 3 ML: .5; 3 SOLUTION RESPIRATORY (INHALATION) at 08:22

## 2020-01-01 RX ADMIN — Medication 10 ML: at 06:47

## 2020-01-01 RX ADMIN — BRIMONIDINE TARTRATE 1 DROP: 2 SOLUTION OPHTHALMIC at 16:48

## 2020-01-01 RX ADMIN — BUDESONIDE 500 MCG: 0.5 INHALANT RESPIRATORY (INHALATION) at 20:09

## 2020-01-01 RX ADMIN — Medication 10 ML: at 21:02

## 2020-01-01 RX ADMIN — GUAIFENESIN 1200 MG: 600 TABLET, EXTENDED RELEASE ORAL at 17:19

## 2020-01-01 RX ADMIN — BUDESONIDE 500 MCG: 0.5 INHALANT RESPIRATORY (INHALATION) at 08:58

## 2020-01-01 RX ADMIN — DORZOLAMIDE HYDROCHLORIDE 1 DROP: 20 SOLUTION/ DROPS OPHTHALMIC at 09:20

## 2020-01-01 RX ADMIN — FENTANYL CITRATE 50 MCG: 50 INJECTION, SOLUTION INTRAMUSCULAR; INTRAVENOUS at 12:48

## 2020-01-01 RX ADMIN — SENNOSIDES 17.2 MG: 8.6 TABLET, FILM COATED ORAL at 22:00

## 2020-01-01 RX ADMIN — PRAZOSIN HYDROCHLORIDE 4 MG: 1 CAPSULE ORAL at 21:52

## 2020-01-01 RX ADMIN — BUDESONIDE 500 MCG: 0.5 INHALANT RESPIRATORY (INHALATION) at 20:55

## 2020-01-01 RX ADMIN — Medication 10 ML: at 01:42

## 2020-01-01 RX ADMIN — METHYLPREDNISOLONE SODIUM SUCCINATE 60 MG: 125 INJECTION, POWDER, FOR SOLUTION INTRAMUSCULAR; INTRAVENOUS at 10:26

## 2020-01-01 RX ADMIN — POLYETHYLENE GLYCOL 3350 17 G: 17 POWDER, FOR SOLUTION ORAL at 10:13

## 2020-01-01 RX ADMIN — SENNOSIDES 17.2 MG: 8.6 TABLET, FILM COATED ORAL at 21:10

## 2020-01-01 RX ADMIN — IPRATROPIUM BROMIDE AND ALBUTEROL SULFATE 3 ML: .5; 3 SOLUTION RESPIRATORY (INHALATION) at 00:17

## 2020-01-01 RX ADMIN — PANTOPRAZOLE SODIUM 40 MG: 40 TABLET, DELAYED RELEASE ORAL at 07:21

## 2020-01-01 RX ADMIN — METHYLPREDNISOLONE SODIUM SUCCINATE 60 MG: 125 INJECTION, POWDER, FOR SOLUTION INTRAMUSCULAR; INTRAVENOUS at 17:24

## 2020-01-01 RX ADMIN — METHYLPREDNISOLONE SODIUM SUCCINATE 60 MG: 40 INJECTION, POWDER, FOR SOLUTION INTRAMUSCULAR; INTRAVENOUS at 17:45

## 2020-01-01 RX ADMIN — BRIMONIDINE TARTRATE 1 DROP: 2 SOLUTION OPHTHALMIC at 17:25

## 2020-01-01 RX ADMIN — POLYETHYLENE GLYCOL 3350 17 G: 17 POWDER, FOR SOLUTION ORAL at 18:45

## 2020-01-01 RX ADMIN — DORZOLAMIDE HYDROCHLORIDE 1 DROP: 20 SOLUTION/ DROPS OPHTHALMIC at 17:15

## 2020-01-01 RX ADMIN — IPRATROPIUM BROMIDE AND ALBUTEROL SULFATE 3 ML: .5; 3 SOLUTION RESPIRATORY (INHALATION) at 20:49

## 2020-01-01 RX ADMIN — GUAIFENESIN 1200 MG: 600 TABLET, EXTENDED RELEASE ORAL at 17:54

## 2020-01-01 RX ADMIN — ARFORMOTEROL TARTRATE 15 MCG: 15 SOLUTION RESPIRATORY (INHALATION) at 19:39

## 2020-01-01 RX ADMIN — BUDESONIDE 500 MCG: 0.5 INHALANT RESPIRATORY (INHALATION) at 20:10

## 2020-01-01 RX ADMIN — METHYLPREDNISOLONE SODIUM SUCCINATE 60 MG: 40 INJECTION, POWDER, FOR SOLUTION INTRAMUSCULAR; INTRAVENOUS at 18:14

## 2020-01-01 RX ADMIN — APIXABAN 5 MG: 5 TABLET, FILM COATED ORAL at 22:30

## 2020-01-01 RX ADMIN — MELATONIN TAB 3 MG 9 MG: 3 TAB at 22:04

## 2020-01-01 RX ADMIN — BRIMONIDINE TARTRATE 1 DROP: 2 SOLUTION OPHTHALMIC at 00:03

## 2020-01-01 RX ADMIN — ACETAMINOPHEN 650 MG: 325 TABLET ORAL at 07:45

## 2020-01-01 RX ADMIN — METHYLPREDNISOLONE SODIUM SUCCINATE 60 MG: 125 INJECTION, POWDER, FOR SOLUTION INTRAMUSCULAR; INTRAVENOUS at 21:52

## 2020-01-01 RX ADMIN — MELATONIN TAB 3 MG 9 MG: 3 TAB at 22:00

## 2020-01-01 RX ADMIN — GUAIFENESIN 1200 MG: 600 TABLET, EXTENDED RELEASE ORAL at 18:45

## 2020-01-01 RX ADMIN — ACETAMINOPHEN 650 MG: 325 TABLET ORAL at 06:39

## 2020-01-01 RX ADMIN — METHYLPREDNISOLONE SODIUM SUCCINATE 80 MG: 125 INJECTION, POWDER, FOR SOLUTION INTRAMUSCULAR; INTRAVENOUS at 10:14

## 2020-01-01 RX ADMIN — BRIMONIDINE TARTRATE 1 DROP: 2 SOLUTION OPHTHALMIC at 10:05

## 2020-01-01 RX ADMIN — IPRATROPIUM BROMIDE AND ALBUTEROL SULFATE 3 ML: .5; 3 SOLUTION RESPIRATORY (INHALATION) at 15:33

## 2020-01-01 RX ADMIN — ONDANSETRON 4 MG: 2 INJECTION INTRAMUSCULAR; INTRAVENOUS at 00:39

## 2020-01-01 RX ADMIN — METHYLPREDNISOLONE SODIUM SUCCINATE 60 MG: 125 INJECTION, POWDER, FOR SOLUTION INTRAMUSCULAR; INTRAVENOUS at 21:57

## 2020-01-01 RX ADMIN — ACETAMINOPHEN 650 MG: 325 TABLET ORAL at 01:20

## 2020-01-01 RX ADMIN — IPRATROPIUM BROMIDE AND ALBUTEROL SULFATE 3 ML: .5; 3 SOLUTION RESPIRATORY (INHALATION) at 04:24

## 2020-01-01 RX ADMIN — BUDESONIDE 500 MCG: 0.5 INHALANT RESPIRATORY (INHALATION) at 11:30

## 2020-01-01 RX ADMIN — ACETAMINOPHEN 650 MG: 325 TABLET ORAL at 07:16

## 2020-01-01 RX ADMIN — DIAZEPAM 5 MG: 5 INJECTION, SOLUTION INTRAMUSCULAR; INTRAVENOUS at 23:16

## 2020-01-01 RX ADMIN — DILTIAZEM HYDROCHLORIDE 300 MG: 180 CAPSULE, COATED, EXTENDED RELEASE ORAL at 09:19

## 2020-01-01 RX ADMIN — ATORVASTATIN CALCIUM 40 MG: 20 TABLET, FILM COATED ORAL at 21:12

## 2020-01-01 RX ADMIN — IPRATROPIUM BROMIDE AND ALBUTEROL SULFATE 3 ML: .5; 3 SOLUTION RESPIRATORY (INHALATION) at 11:07

## 2020-01-01 RX ADMIN — BUDESONIDE 500 MCG: 0.5 INHALANT RESPIRATORY (INHALATION) at 07:55

## 2020-01-01 RX ADMIN — IPRATROPIUM BROMIDE AND ALBUTEROL SULFATE 3 ML: .5; 3 SOLUTION RESPIRATORY (INHALATION) at 15:58

## 2020-01-01 RX ADMIN — ACETAMINOPHEN 650 MG: 325 TABLET ORAL at 14:20

## 2020-01-01 RX ADMIN — ENOXAPARIN SODIUM 40 MG: 40 INJECTION SUBCUTANEOUS at 21:14

## 2020-01-01 RX ADMIN — MELATONIN TAB 3 MG 9 MG: 3 TAB at 00:02

## 2020-01-01 RX ADMIN — ZIPRASIDONE HYDROCHLORIDE 40 MG: 20 CAPSULE ORAL at 17:54

## 2020-01-01 RX ADMIN — DIAZEPAM 2.5 MG: 5 TABLET ORAL at 21:12

## 2020-01-01 RX ADMIN — TRAMADOL HYDROCHLORIDE 50 MG: 50 TABLET, FILM COATED ORAL at 08:10

## 2020-01-01 RX ADMIN — METHYLPREDNISOLONE SODIUM SUCCINATE 60 MG: 125 INJECTION, POWDER, FOR SOLUTION INTRAMUSCULAR; INTRAVENOUS at 22:44

## 2020-01-01 RX ADMIN — ACETAMINOPHEN 650 MG: 325 TABLET ORAL at 10:17

## 2020-01-01 RX ADMIN — ATORVASTATIN CALCIUM 40 MG: 20 TABLET, FILM COATED ORAL at 21:20

## 2020-01-01 RX ADMIN — GUAIFENESIN 1200 MG: 600 TABLET, EXTENDED RELEASE ORAL at 08:49

## 2020-01-01 RX ADMIN — ARFORMOTEROL TARTRATE 15 MCG: 15 SOLUTION RESPIRATORY (INHALATION) at 21:15

## 2020-01-01 RX ADMIN — BRIMONIDINE TARTRATE 1 DROP: 2 SOLUTION OPHTHALMIC at 08:27

## 2020-01-01 RX ADMIN — GUAIFENESIN 1200 MG: 600 TABLET, EXTENDED RELEASE ORAL at 10:01

## 2020-01-01 RX ADMIN — KETOROLAC TROMETHAMINE 30 MG: 30 INJECTION, SOLUTION INTRAMUSCULAR at 19:44

## 2020-01-01 RX ADMIN — TRAMADOL HYDROCHLORIDE 50 MG: 50 TABLET, FILM COATED ORAL at 15:46

## 2020-01-01 RX ADMIN — IPRATROPIUM BROMIDE AND ALBUTEROL SULFATE 3 ML: .5; 3 SOLUTION RESPIRATORY (INHALATION) at 00:27

## 2020-01-01 RX ADMIN — APIXABAN 5 MG: 5 TABLET, FILM COATED ORAL at 22:03

## 2020-01-01 RX ADMIN — IPRATROPIUM BROMIDE AND ALBUTEROL SULFATE 3 ML: .5; 3 SOLUTION RESPIRATORY (INHALATION) at 11:41

## 2020-01-01 RX ADMIN — BRIMONIDINE TARTRATE 1 DROP: 2 SOLUTION OPHTHALMIC at 16:03

## 2020-01-01 RX ADMIN — APIXABAN 5 MG: 5 TABLET, FILM COATED ORAL at 08:49

## 2020-01-01 RX ADMIN — IPRATROPIUM BROMIDE AND ALBUTEROL SULFATE 3 ML: .5; 3 SOLUTION RESPIRATORY (INHALATION) at 20:42

## 2020-01-01 RX ADMIN — FENTANYL CITRATE 50 MCG: 50 INJECTION, SOLUTION INTRAMUSCULAR; INTRAVENOUS at 17:28

## 2020-01-01 RX ADMIN — ZIPRASIDONE HYDROCHLORIDE 40 MG: 20 CAPSULE ORAL at 08:48

## 2020-01-01 RX ADMIN — IPRATROPIUM BROMIDE AND ALBUTEROL SULFATE 3 ML: .5; 3 SOLUTION RESPIRATORY (INHALATION) at 19:48

## 2020-01-01 RX ADMIN — GUAIFENESIN 1200 MG: 600 TABLET, EXTENDED RELEASE ORAL at 17:24

## 2020-01-01 RX ADMIN — TRAMADOL HYDROCHLORIDE 50 MG: 50 TABLET, FILM COATED ORAL at 09:19

## 2020-01-01 RX ADMIN — IPRATROPIUM BROMIDE AND ALBUTEROL SULFATE 3 ML: .5; 3 SOLUTION RESPIRATORY (INHALATION) at 00:45

## 2020-01-01 RX ADMIN — DOXYCYCLINE 100 MG: 100 INJECTION, POWDER, LYOPHILIZED, FOR SOLUTION INTRAVENOUS at 21:49

## 2020-01-01 RX ADMIN — HYDROMORPHONE HYDROCHLORIDE 1 MG: 2 INJECTION, SOLUTION INTRAMUSCULAR; INTRAVENOUS; SUBCUTANEOUS at 18:24

## 2020-01-01 RX ADMIN — ZIPRASIDONE HYDROCHLORIDE 40 MG: 20 CAPSULE ORAL at 09:49

## 2020-01-01 RX ADMIN — Medication 10 ML: at 22:05

## 2020-01-01 RX ADMIN — DORZOLAMIDE HYDROCHLORIDE 1 DROP: 20 SOLUTION/ DROPS OPHTHALMIC at 08:10

## 2020-01-01 RX ADMIN — DIAZEPAM 5 MG: 5 INJECTION, SOLUTION INTRAMUSCULAR; INTRAVENOUS at 00:08

## 2020-01-01 RX ADMIN — ACETAMINOPHEN 650 MG: 325 TABLET ORAL at 21:23

## 2020-01-01 RX ADMIN — METHYLPREDNISOLONE SODIUM SUCCINATE 40 MG: 40 INJECTION, POWDER, FOR SOLUTION INTRAMUSCULAR; INTRAVENOUS at 14:43

## 2020-01-01 RX ADMIN — GUAIFENESIN 1200 MG: 600 TABLET, EXTENDED RELEASE ORAL at 17:45

## 2020-01-01 RX ADMIN — IPRATROPIUM BROMIDE AND ALBUTEROL SULFATE 3 ML: .5; 3 SOLUTION RESPIRATORY (INHALATION) at 07:20

## 2020-01-01 RX ADMIN — MAGNESIUM SULFATE HEPTAHYDRATE 2 G: 40 INJECTION, SOLUTION INTRAVENOUS at 22:55

## 2020-01-01 RX ADMIN — DORZOLAMIDE HYDROCHLORIDE 1 DROP: 20 SOLUTION/ DROPS OPHTHALMIC at 17:54

## 2020-01-01 RX ADMIN — IPRATROPIUM BROMIDE AND ALBUTEROL SULFATE 3 ML: .5; 3 SOLUTION RESPIRATORY (INHALATION) at 22:53

## 2020-01-01 RX ADMIN — IPRATROPIUM BROMIDE AND ALBUTEROL SULFATE 3 ML: .5; 3 SOLUTION RESPIRATORY (INHALATION) at 04:16

## 2020-01-01 RX ADMIN — MELATONIN TAB 3 MG 9 MG: 3 TAB at 21:54

## 2020-01-01 RX ADMIN — DIAZEPAM 2.5 MG: 5 TABLET ORAL at 08:50

## 2020-01-01 RX ADMIN — BRIMONIDINE TARTRATE 1 DROP: 2 SOLUTION OPHTHALMIC at 09:15

## 2020-01-01 RX ADMIN — GUAIFENESIN 1200 MG: 600 TABLET, EXTENDED RELEASE ORAL at 17:25

## 2020-03-11 PROBLEM — J96.21 ACUTE ON CHRONIC RESPIRATORY FAILURE WITH HYPOXIA (HCC): Status: ACTIVE | Noted: 2020-01-01

## 2020-03-12 NOTE — H&P
[address] [facility] Adult  Hospitalist Group History and Physical 
 
Primary Care Provider: None Date of Service:  3/11/2020 Subjective:  
 
Linda Flores is a 81 y.o. male with past medical history of constipatory failure on for home oxygen due to COPD [GOLD stage III], active smoker, hypertension, hyperlipidemia came to the ED for evaluation of worsening shortness of breath since yesterday. patient reports that he was having chest tightness associated with worsening shortness of breath since yesterday and failed to respond multiple neb treatments at home. He denied fever even though he had a temperature spike of 101F at the ED. He has cough productive of scanty whitish sputum. Patient had his flu shot for the season. Patient is an active smoker. But denied palpitation, chest pain orthopnea PND Review of Systems: 
 
12 point review of systems was done and found to be negative except the one mentioned in the HPI No past medical history on file. COPD, chronic respiratory failure, home oxygen, hypertension, 
Prior to Admission medications Not on File Allergies Allergen Reactions  Hydrocodone Nausea Only  Oxycodone Nausea Only  Percocet [Oxycodone-Acetaminophen] Nausea Only  Vicodin [Hydrocodone-Acetaminophen] Nausea Only No family history on file. SOCIAL HISTORY: 
Patient resides at home Patient ambulates with without support Smoking history:  active smoker Alcohol history: Occasional 
 
 
 
Objective:  
 
 
Physical Exam:  
Physical Exam 
Constitutional:   
   General: He is not in acute distress. Appearance: Normal appearance. He is not ill-appearing. HENT:  
   Head: Normocephalic and atraumatic. Nose: Nose normal.  
   Mouth/Throat:  
   Mouth: Mucous membranes are moist.  
   Pharynx: No oropharyngeal exudate. Eyes:  
   General:     
   Right eye: No discharge. Left eye: No discharge. Pupils: Pupils are equal, round, and reactive to light. Neck: Musculoskeletal: Normal range of motion. No neck rigidity or muscular tenderness. Cardiovascular:  
   Rate and Rhythm: Normal rate and regular rhythm. Heart sounds: No murmur. No friction rub. No gallop. Pulmonary:  
   Effort: Respiratory distress present. Breath sounds: No stridor. Wheezing present. No rhonchi or rales. Comments: Decreased air entry on both lung fields Chest:  
   Chest wall: No tenderness. Abdominal:  
   General: Abdomen is flat. Bowel sounds are normal. There is no distension. Palpations: There is no mass. Musculoskeletal: Normal range of motion. General: No swelling or tenderness. Lymphadenopathy:  
   Cervical: No cervical adenopathy. Skin: 
   General: Skin is warm and dry. Coloration: Skin is not jaundiced or pale. Neurological:  
   General: No focal deficit present. Mental Status: He is alert and oriented to person, place, and time. Cranial Nerves: No cranial nerve deficit. Sensory: No sensory deficit. Psychiatric:     
   Mood and Affect: Mood normal.     
   Behavior: Behavior normal.  
 
 
Cap refill: Brisk, less than 3 seconds Pulses: 2+, symmetric in all extremities ECG:  NSTR NS ST and T w abn Data Review: All diagnostic labs and studies have been reviewed. CTA chest negative for PE or pneumonia. Assessment: #Acute on chronic respiratory failure with hypoxia 
-Patient presented with worsening shortness of breath despite 4 L of O2 at home 
-Patient now feels better with oxygen mask 
-will get ABG 
-Continuous O2 sat monitoring #COPD exacerbation 
-CTA chest negative for PE , rapid flu test negative 
-Continue supplemental O2 
-DuoNebs every 4 hours around-the-clock, IV Solu-Medrol 
-Incentive spirometry 
-IV Zithromax, follow respiratory panel 
-Pulmonary consult # SIRS 
 -Patient had one episode of fever and tachycardia -Suspect viral URTI in the setting of normal procalcitonin and white count 
-Initial lactic acid normal 
-IV fluid, follow viral respiratory panel 
-Empiric IV Zithromax in the setting of COPD exacerbation #Tobacco dependence 
-Patient continues to smoke 1 pack of cigarettes per day 
-Patient strongly counseled against smoking 
-We will provide NicoDerm Electrolyte abnormality 
-Correct hypomagnesemia and hypokalemia 
- telemetry monitoring HLD 
-Statin HTN 
- continue with metoprolol and prazosin FUNCTIONAL STATUS PRIOR TO HOSPITALIZATION (including history of recent falls) Signed By: Elida Portillo MD   
 March 11, 2020

## 2020-03-12 NOTE — ED PROVIDER NOTES
81 y.o. male with past medical history significant for COPD who presents from home via EMS accompanied by his wife with chief complaint of shortness of breath. He states beginning last night and this morning, he has had shortness of breath and \"pains in (his) lungs\", accompanied by sore throat and cough. He notes worsened pain in his throat and chest when coughing, rated as 7/10 in severity. He reports using albuterol and ipratropium bromide nebulizer treatments at home 3 times today with no relief. He also complains of diarrhea today, and took psyllium. Patient states he wears 4L O2 at home at baseline. He notes he takes 5 mg prednisone daily, but denies taking antibiotics. He endorses smoking \"20 cigarettes a day\". He denies fever, vomiting, leg pain, and leg swelling. There are no other acute medical concerns at this time. Social hx: daily smoker (20 cigarettes/day) Surgical hx: back surgery (L4/L5) PCP: None Note written by Sabra Gardner, as dictated by Darvin Tran MD 9:24 PM 
 
 
 
The history is provided by the patient. No  was used. No past medical history on file. No past surgical history on file. No family history on file. Social History Socioeconomic History  Marital status: Not on file Spouse name: Not on file  Number of children: Not on file  Years of education: Not on file  Highest education level: Not on file Occupational History  Not on file Social Needs  Financial resource strain: Not on file  Food insecurity Worry: Not on file Inability: Not on file  Transportation needs Medical: Not on file Non-medical: Not on file Tobacco Use  Smoking status: Not on file Substance and Sexual Activity  Alcohol use: Not on file  Drug use: Not on file  Sexual activity: Not on file Lifestyle  Physical activity Days per week: Not on file Minutes per session: Not on file  Stress: Not on file Relationships  Social connections Talks on phone: Not on file Gets together: Not on file Attends Adventism service: Not on file Active member of club or organization: Not on file Attends meetings of clubs or organizations: Not on file Relationship status: Not on file  Intimate partner violence Fear of current or ex partner: Not on file Emotionally abused: Not on file Physically abused: Not on file Forced sexual activity: Not on file Other Topics Concern  Not on file Social History Narrative  Not on file ALLERGIES: Patient has no allergy information on record. Review of Systems Constitutional: Negative for fever. HENT: Positive for sore throat. Respiratory: Positive for cough and shortness of breath. Cardiovascular: Positive for chest pain. Negative for leg swelling. Gastrointestinal: Negative for vomiting. Musculoskeletal:  
     Negative for leg pain Vitals:  
 03/11/20 2118 BP: 144/68 Pulse: 95 Resp: 24 Temp: (!) 101.6 °F (38.7 °C) SpO2: 97% Weight: 70.3 kg (155 lb) Height: 5' 9\" (1.753 m) Physical Exam  
Physical Examination: General appearance - alert, thin, chronically ill appearing, mild distress, oriented to person, place, and time and normal appearing weight Eyes - pupils equal and reactive, extraocular eye movements intact Neck - supple, no significant adenopathy Chest - diminished breath sounds b/l with diffuse wheezing and prolonged expiration, nasal oxygen in place Heart - normal rate, regular rhythm, normal S1, S2, no murmurs, rubs, clicks or gallops Abdomen - soft, nontender, nondistended, no masses or organomegaly Back exam - full range of motion, no tenderness, palpable spasm or pain on motion Neurological - alert, oriented, normal speech, no focal findings or movement disorder noted Musculoskeletal - no joint tenderness, deformity or swelling Extremities - peripheral pulses normal, no pedal edema, no clubbing or cyanosis Skin - normal coloration and turgor, no rashes, no suspicious skin lesions noted, numerous tattoos MDM Number of Diagnoses or Management Options Amount and/or Complexity of Data Reviewed Clinical lab tests: ordered and reviewed Tests in the radiology section of CPT®: ordered and reviewed Discuss the patient with other providers: yes (hospitalist) Independent visualization of images, tracings, or specimens: yes Critical Care Total time providing critical care: 30-74 minutes Patient Progress Patient progress: improved Procedures ED EKG interpretation: 
Rhythm: normal sinus rhythm; and regular . Rate (approx.): 96; Axis: normal; P wave: normal; QRS interval: normal ; ST/T wave: non-specific changes;  
EKG documented by Tisha Kumar MD 
 
Hospitalist Perfect Serve for Admission 11:10 PM 
 
ED Room Number: SQ46/41 Patient Name and age:  Matt Moore 81 y.o.  male Working Diagnosis: 1. Acute exacerbation of chronic obstructive pulmonary disease (COPD) ([facility] [address] 2. Acute bronchitis, unspecified organism Readmission: no 
Isolation Requirements:  no 
Recommended Level of Care:  telemetry Code Status:  Full Code Department:[facility] ED - [phone] Total critical care time spent exclusive of procedures:  35 min

## 2020-03-12 NOTE — PROGRESS NOTES
Luis Espinosa [facility] [address] 
[address] [facility] [place], [address] [place] [address] 
[phone] Medical Progress Note NAME: Deberah Riedel :  1950 MRM:  [id number] Date of service: 3/12/2020 Subjective: Chief Complaint:  F/u sob No acute events. Sob continues. He is feeling very anxious this morning due to not receiving his anxiety meds. No cp. +cough. No sputum. No recent travel. No sick contacts. Objective:  
 
 
Vitals:  
 
 
  
Last 24hrs VS reviewed since prior progress note. Most recent are: 
 
Visit Vitals /63 (BP 1 Location: Right arm, BP Patient Position: At rest) Pulse 91 Temp 97.7 °F (36.5 °C) Resp 22 Ht 5' 9\" (1.753 m) Wt 68.1 kg (150 lb 2.1 oz) SpO2 93% BMI 22.17 kg/m² SpO2 Readings from Last 6 Encounters:  
20 93% O2 Flow Rate (L/min): 4 l/min Intake/Output Summary (Last 24 hours) at 3/12/2020 1374 Last data filed at 3/12/2020 3948 Gross per 24 hour Intake 1372.5 ml Output 825 ml Net 547.5 ml Exam:  
 
Physical Exam: 
 
Gen:  Chronically ill appearing, in no acute distress HEENT:  Pink conjunctivae, PERRL, hearing intact to voice, moist mucous membranes Neck:  Supple, without masses, thyroid non-tender Resp:  No accessory muscle use, diffuse wheezing and rhonchi bilateral 
Card:  No murmurs, normal S1, S2 without thrills, bruits or peripheral edema Abd:  Soft, non-tender, non-distended, normoactive bowel sounds are present Musc:  No cyanosis or clubbing Skin:  No rashes or ulcers, skin turgor is good Neuro:  No focal deficits, follows commands appropriately Psych:  Good insight, oriented to person, place and time, alert Medications Reviewed: (see below) Lab Data Reviewed: (see below) 
 
______________________________________________________________________ Medications:  
 
Current Facility-Administered Medications Medication Dose Route Frequency  sodium chloride (NS) flush 5-40 mL  5-40 mL IntraVENous Q8H  
 sodium chloride (NS) flush 5-40 mL  5-40 mL IntraVENous PRN  
 ondansetron (ZOFRAN) injection 4 mg  4 mg IntraVENous Q4H PRN  
 enoxaparin (LOVENOX) injection 40 mg  40 mg SubCUTAneous Q24H  
 albuterol-ipratropium (DUO-NEB) 2.5 MG-0.5 MG/3 ML  3 mL Nebulization Q4H RT  
 nicotine (NICODERM CQ) 21 mg/24 hr patch 1 Patch  1 Patch TransDERmal DAILY  methylPREDNISolone (PF) (Solu-MEDROL) injection 60 mg  60 mg IntraVENous Q6H  
 0.9% sodium chloride infusion  125 mL/hr IntraVENous CONTINUOUS  
 senna (SENOKOT) tablet 17.2 mg  2 Tab Oral QHS  prazosin (MINIPRESS) capsule 2 mg  2 mg Oral QHS  pantoprazole (PROTONIX) tablet 40 mg  40 mg Oral DAILY  metoprolol tartrate (LOPRESSOR) tablet 50 mg  50 mg Oral BID  melatonin tablet 4.5 mg  4.5 mg Oral QHS  guaiFENesin ER (MUCINEX) tablet 600 mg  600 mg Oral BID  FLUoxetine (PROzac) capsule 60 mg  60 mg Oral DAILY  docusate sodium (COLACE) capsule 100 mg  100 mg Oral TID PRN  
 aspirin delayed-release tablet 81 mg  81 mg Oral DAILY  atorvastatin (LIPITOR) tablet 40 mg  40 mg Oral QHS  acetaminophen (TYLENOL) tablet 650 mg  650 mg Oral Q6H PRN  
 diazePAM (VALIUM) tablet 2.5 mg  2.5 mg Oral BID  doxycycline (VIBRAMYCIN) 100 mg in 0.9% sodium chloride (MBP/ADV) 100 mL  100 mg IntraVENous Q12H  
 sodium chloride (NS) flush 5-10 mL  5-10 mL IntraVENous PRN Lab Review:  
 
Recent Labs  
  03/12/20 
0144 03/11/20 2209 WBC 7.0 9.2 HGB 12.4 14.1 HCT 36.6 41.1  233 Recent Labs  
  03/12/20 
0144 03/11/20 2209 * 129*  
K 3.7 4.0  
CL 95* 90* CO2 29 32 * 114* BUN 7 9 CREA 0.84 0.83 CA 7.8* 8.9 MG  --  1.2* ALB 3.5 4.0  
SGOT 23 28 ALT 25 30 No components found for: [place] Assessment / Plan:  
 
Acute on chronic respiratory failure with hypoxia: 2/2 COPD exacerbation. No PE on CTA chest. No pna on imaging. tx as below. Continue oxygen to maintain saturations >90 
 
COPD exacerbation: likely viral. No pna on imaging. Flu negative; viral panel pending. Change azithro to doxy due to QTc concerns. Order pulmicort/brovana. Continue scheduled duonebs and solumedrol SIRS / Elevated lactic acid: received sepsis bolus in ER; will give small bolus this morning. Trend lactic acid. No source of infection thus far other than possible viral 
 
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 Hesitant to stop prozac as anxiety appears to be significant. Can implement fluid restriction once lactate improves Anxiety: severe and contributing to sob this morning. Continue prozac. Order home valium and geodon HTN / HLD: continue bp meds and statin 
 
**2pm: RVP negative, no pna on CTA chest, UA normal. In setting of sob, copd exacerbation and fever will discuss COVID testing with pulmonary/[facility] Total time spent with patient: 36 Minutes Care Plan discussed with: Patient Discussed:  Care Plan Prophylaxis:  Lovenox Disposition:  Home w/Family 
        
___________________________________________________ Attending Physician: Brock Kimball MD

## 2020-03-12 NOTE — ED TRIAGE NOTES
EMS reports pt has hx of COPD and began having SOB yesterday. Used nebulizer treatments at home but this evening his sx worsened so he called EMS. Also complains of chest tightness and congestion. Smoker. Wears 4L O2 via [place] at baseline. O2 sats 95% on RA upon arrival to the ED.

## 2020-03-12 NOTE — ED NOTES
Verbal report given to Carmine Miranda (name) on [facility] at shift change. Report consisted of patient's Situation, Background, Assessment and Recommendations (SBAR) Information from the following report(s)  SBAR, ED Summary, [facility] and Recent Results was reviewed with the receiving nurse. Opportunity for questions and clarification was provided. Last Filed Values: 
Temp: (!) 101.6 °F (38.7 °C) (03/11/20 2118) Pulse (Heart Rate): 98 (03/11/20 2300) Resp Rate: 26 (03/11/20 2300) O2 Sat (%): 98 % (03/12/20 0000) BP: 119/60 (03/11/20 2300) MAP (Monitor): 76 (03/11/20 2300) MAP (Calculated): 93 (03/11/20 2118) Level of Consciousness: Alert (03/11/20 2118) Lab Results Component Value Date/Time WBC 9.2 03/11/2020 10:09 PM  
 Lactic acid 2.0 03/11/2020 10:09 PM  
 
 
Repeat LA:  Time Due 1209 Blood Cultures Drawn:  yes Fluid Resuscitation:  Total needed 2109 mL, Status infusing All Antibiotics Started:  yes, Dose Due n/a 
 
VS x 2 post-fluid resuscitation:   no 
 
Vasopressor Infusion:  no n/a Provider Reassessment needed and notified:  no , Due following fluid resuscitation Additional Interventions/Comments:  n/a

## 2020-03-12 NOTE — PROGRESS NOTES
Reason for Admission:   Acute on chronic respiratory failure, fever. Hx COPD, smoker, HTN, hyperlipidemia. Patient in contact isolation. RUR Score:   17%/low Plan for utilizing home health:     None at this time. Patient has home oxygen through the [place], 4L/minute. PCP: First and Last name:  Dr. Sharri Holland at the [facility]. 
 Name of Practice:  [facility] Are you a current patient: Yes/No: yes Approximate date of last visit:  1 month ago Current Advanced Directive/Advance Care Plan:  
Not on file. Next of kin is daughter Wilmer Gibson Transition of Care Plan:      
Chart reviewed, demographics verified. CM role and follow up discussed. Patient lives with his ex-wife Екатерина Kaminski. Patient lives in a two story home with 3 steps to enter home and 14 steps to upper level. Patient has prescription drug coverage, uses [facility]. Patient assistance as needed from his ex-wife Wilmer Gibson. PLAN: 
1. Monitor patient response to treatment and recommendations. 2. Await further evaluation. 3. Patient is currently followed by the [facility]. 4. CM to monitor for discharge needs. Care Management Interventions PCP Verified by CM: Yes(Dr. Shikha Manuel, [facility]) Palliative Care Criteria Met (RRAT>21 & CHF Dx)?: No 
Transition of Care Consult (CM Consult): Discharge Planning Current Support Network: Lives with Spouse Confirm Follow Up Transport: Family The Plan for Transition of Care is Related to the Following Treatment Goals : Discharge Discharge Location Discharge Placement: Home with family assistance Claudy Choe, RN, MSN, Care manager

## 2020-03-12 NOTE — PROGRESS NOTES
Nutrition Assessment: 
 
RECOMMENDATIONS/INTERVENTION(S):  
1. Continue with Regular diet order to promote PO intakes. Will continue to monitor PO intakes, weight, labs. ASSESSMENT:  
3/12: 82 yo male admitted for acute on chronic respiratory failure. RD assessment for low BMI screen. PMhx: COPD, HTN, HLD, on home O2. Underweight per BMI per age. No weight hx in EMR to assess change, pt denies recent weight change. Regular diet ordered - intakes documented as 75-90% meals. Pt confirms good appetite here and PTA. Denies difficulty chewing/swallowing. Labs: , Na+ 129. Meds: statin, MgSu, Solumedrol. NaCl running at 125ml/hr. Skin intact. Diet Order: Regular 
% Eaten:   
Patient Vitals for the past 72 hrs: 
 % Diet Eaten 03/12/20 1219 90 % 03/12/20 0831 75 % Pertinent Medications: [x] Reviewed Labs: [x] Reviewed Anthropometrics: Height: 5' 9\" (175.3 cm) Weight: 68.1 kg (150 lb 2.1 oz) IBW (%IBW):   ( ) UBW (%UBW):   (  %) BMI: Body mass index is 22.17 kg/m². This BMI is indicative of: 
 [] Underweight    [] Normal    [] Overweight    []  Obesity    []  Extreme Obesity (BMI>40) Estimated Nutrition Needs (Based on): 2130 Kcals/day(REE 1446 x AF 1.3 + 250) , 68 g(68-82gm (1-1.2gm/kg/d)) Protein Carbohydrate: At Least 130 g/day  Fluids: 2130 mL/day (1 ml/kcal) Last BM:    [x]Active     []Hyperactive  []Hypoactive       [] Absent   BS Skin:    [x] Intact   [] Incision  [] Breakdown   [] DTI   [] Tears/Excoriation/Abrasion  []Edema [] Other: Wt Readings from Last 30 Encounters:  
03/12/20 68.1 kg (150 lb 2.1 oz) NUTRITION DIAGNOSES:  
Problem:  Underweight Etiology: related to decreased ability to consume sufficient energy to maintain appropriate weight Signs/Symptoms: as evidenced by BMI 22 (age > 72 years) NUTRITION INTERVENTIONS: 
Meals/Snacks: General/healthful diet   Supplements: Commercial supplement GOAL:  
 Consume > 75% all meals + ONS to promote weight gain of 0.5lb within next 4-6 days Cultural, Quaker, or Ethnic Dietary Needs: None EDUCATION & DISCHARGE NEEDS:  
 [x] None Identified 
 [] Identified and Education Provided/Documented 
 [] Identified and Pt declined/was not appropriate [x] Interdisciplinary Care Plan Reviewed/Documented  
 [x] Discharge Needs:   General healthy diet 
 [] No Nutrition Related Discharge Needs NUTRITION RISK:  
Pt Is At Nutrition Risk  [x] No Nutrition Risk Identified  [] PT SEEN FOR:  
 []  MD Consult: []Calorie Count []Diabetic Diet Education []Diet Education []Electrolyte Management []General Nutrition Management and Supplements []Management of Tube Feeding []TPN Recommendations []  RN Referral:  []MST score >=2 
   []Enteral/Parenteral Nutrition PTA []Pregnant: Gestational DM or Multigestation  
              [] Pressure Ulcer 
 
[x]  Low BMI      []  Length of Stay       [] Dysphagia Diet         [] Ventilator 
[]  Follow-up Previous Recommendations: 
 [] Implemented          [] Not Implemented          [x] Not Applicable Previous Goal: 
 [] Met              [] Progressing Towards Goal              [] Not Progressing Towards Goal   [x] Not Applicable Derick Diamond RD Pager [phone] Phone [phone]

## 2020-03-12 NOTE — ED NOTES
Verbal shift change report given to Kalin Ceja RN (oncoming nurse) by Jennifer Vazquez RN (offgoing nurse). Report included the following information SBAR, Kardex, ED Summary, MAR, Recent Results, Med Rec Status and Cardiac Rhythm NSR.

## 2020-03-12 NOTE — PROGRESS NOTES
Contacted Mannie Curiel, Supervisor at [facility] for potential COVID-19 testing. After review of the chart, [facility] has decided that the patient does not meet [facility] COVID-19 testing criteria.

## 2020-03-12 NOTE — CDMP QUERY
Patient admitted with COPD exacerbation and Acute on Chronic Respiratory Failure. Noted documentation of \"SIRS &  No source of infection thus far \" Please specify in your progress notes and dc summary if you are treating any of the following: 
 
=> SIRS of noninfectious source with acute on chronic respiratory failure  
=> SIRS 2/2 Viral etiology  
=> Other explanation  
=> Unable to determine The medical record reflects the following:   
   Risk Factors:  80 yo M admitted with COPD exacerbation and Acute on Chronic Respiratory Failure Clinical Indicators:  Temp 101.6 Pulse 101 RR 24   
   3/12 PN \" SIRS / Elevated lactic acid: received sepsis bolus in ER; will give small bolus this morning. Trend lactic acid. No source of infection thus far other than possible viral\" Treatment: IV 3 l NS bolus, IV Doxycycline, IV Rocephin Thank you for your time [facility] RN/BSN, CCDS Desk:   [phone] Other:  [phone]

## 2020-03-12 NOTE — PROGRESS NOTES
Bedside and Verbal shift change report given to Kailyn (oncoming nurse) by Rian Molina (offgoing nurse). Report included the following information SBAR, Kardex and Cardiac Rhythm NSr.  
 
0930 educated patient to call RN before getting out of bed, bed alarm on, gripper socks on, call bell in reach. 1100 patient very anxious, attempted to sit with patient and calm patient/talk patient down. Asked spiritual care services to spend time with patient. 1200 CHG given to patient, small scab noted on buttocks 1300 patient feeling calmer after speaking with spiritual services. 1630 noted that lactic acid had not been drawn, will draw stat. Bedside and Verbal shift change report given to Rian Molina (oncoming nurse) by Omaira Merritt (offgoing nurse). Report included the following information SBAR, Kardex and Cardiac Rhythm NSR.

## 2020-03-12 NOTE — PROGRESS NOTES
Notified by Primary RN that patient want to sign a refusal for bed alarm use. Primary RN states she believes this patient is at high risk for falls. This RN goes to bedside to explain rationale for use and what makes him a high fall risk. He verbalized understanding. He is further educated regarding use of call bell, purposeful hourly rounding, that injury may occur if he were to fall, length of stay may increase and he verbalized understanding. He agrees to have the bed alarm on and says he will use the call bell for assist with getting up. He demonstrates understanding of how to use call bell and to call prior to having an urgency issue with needing to eliminate.

## 2020-03-12 NOTE — CONSULTS
[facility] Pulmonary, Critical Care, and Sleep Medicine Initial Patient Consult Name: Eli Bryant MRN: [id number] : 1950 Hospital: [address] [facility] Date: 3/12/2020 IMPRESSION:  
· Acute on chronic hypoxemic/hypercapneic respiratory failure · Advanced COPD w/ acute exacerbation · Hyponatremia · Fever · Hyperglycemia RECOMMENDATIONS:  
· Place on NIPPV. Repeat abg in am 
· Supplemental O2 as needed to keep sats > 88% · Continue scheduled duonebs, pulmicort, brovana · Continue current dose of IV steroids. On 5mg prednisone at baseline · Increase mucinex, add flutter valve · Follow cultures · Continue doxy · Certainly not unreasonable to test for COVID-19; however, does not meet criteria per the health dept · Speech eval 
· Check TSH · On protonix for GERD 
· On lovenox for DVT ppx Thank you for the consult Will follow Subjective:  
 
Asked to see Mr. Alyssa Kemp by Dr. Rachael Woodruff for evaluation of COPD. Mr. Alyssa Kemp is a 72yo male w/ history of chronic hypoxemic respiratory failure (on 4L at baseline), COPD and anxiety who presented to the ER on 3/11 w/ complaints of shortness of breath x 2 weeks, wheezing, cough productive of brown sputum, pleuritic chest pain and sore throat. Denies sick contacts or recent travel. Does report frequent coughing while eating/drinking. Is mostly homebound but gets to the [place]  for appointments and was last there on Monday. He is followed by pulmonary at the [place] -- sees Dr. Merril Galeazzi. Smokes 1ppd (down from 3ppd) since age 15. No past medical history on file. No past surgical history on file. Prior to Admission medications Medication Sig Start Date End Date Taking? Authorizing Provider  
predniSONE (DELTASONE) 5 mg tablet Take 5 mg by mouth daily.    Yes Provider, Historical  
albuterol (PROVENTIL HFA, VENTOLIN HFA, PROAIR HFA) 90 mcg/actuation inhaler Take 2 Puffs by inhalation every four (4) hours as needed for Wheezing or Shortness of Breath. Yes Provider, Historical  
aspirin delayed-release 81 mg tablet Take 81 mg by mouth daily. Yes Provider, Historical  
atorvastatin (LIPITOR) 80 mg tablet Take 40 mg by mouth nightly. Yes Provider, Historical  
docusate sodium (COLACE) 100 mg capsule Take 100 mg by mouth three (3) times daily as needed for Constipation. Yes Provider, Historical  
senna (SENOKOT) 8.6 mg tablet Take 2 Tabs by mouth nightly. Yes Provider, Historical  
psyllium (METAMUCIL) powd Take 1 Dose by mouth two (2) times a day. 2 teaspoons in liquid twice daily    Yes Provider, Historical  
diazePAM (VALIUM) 5 mg tablet Take 2.5 mg by mouth daily. Indications: anxious   Yes Provider, Historical  
cholecalciferol (VITAMIN D3) (1000 Units /25 mcg) tablet Take 2,000 Units by mouth daily. Yes Provider, Historical  
denosumab (PROLIA) 60 mg/mL injection 60 mg by SubCUTAneous route See Admin Instructions. One syringe subq every 6 months. Due 3/13    Yes Provider, Historical  
ipratropium (ATROVENT) 0.02 % soln 0.5 mg by Nebulization route every four (4) hours as needed (shortness of breath). Yes Provider, Historical  
guaiFENesin (ORGANIDIN) 200 mg tablet Take 200 mg by mouth every six (6) hours as needed for Congestion. Yes Provider, Historical  
metoprolol tartrate (LOPRESSOR) 50 mg tablet Take 50 mg by mouth two (2) times a day. Patient takes in morning (0900) and afternoon (1600)   Yes Provider, Historical  
albuterol (PROVENTIL VENTOLIN) 2.5 mg /3 mL (0.083 %) nebu 2.5 mg by Nebulization route every six (6) hours as needed for Wheezing. Yes Provider, Historical  
budesonide-formoteroL (SYMBICORT) 160-4.5 mcg/actuation HFAA Take 2 Puffs by inhalation two (2) times a day. Yes Provider, Historical  
polyethylene glycol (MIRALAX) 17 gram packet Take 17 g by mouth daily as needed for Constipation.    Yes Provider, Historical  
 pantoprazole (PROTONIX) 40 mg tablet Take 40 mg by mouth daily. Yes Provider, Historical  
melatonin 5 mg tablet Take 10 mg by mouth nightly. Yes Provider, Historical  
prazosin (MINIPRESS) 2 mg capsule Take 4 mg by mouth nightly. Yes Provider, Historical  
ziprasidone (GEODON) 40 mg capsule Take 40 mg by mouth two (2) times daily (with meals). Yes Provider, Historical  
FLUoxetine (PROzac) 20 mg capsule Take 60 mg by mouth daily. Yes Provider, Historical  
pediatric multivitamins chewable tablet Take 1 Tab by mouth daily. Yes Provider, Historical  
brinzolamide-brimonidine 1-0.2 % drps Administer 1 Drop to both eyes three (3) times daily. Yes Provider, Historical  
diazePAM (VALIUM) 5 mg tablet Take 5 mg by mouth nightly. Indications: anxious   Yes Provider, Historical  
 
Allergies Allergen Reactions  Hydrocodone Nausea Only  Oxycodone Nausea Only  Percocet [Oxycodone-Acetaminophen] Nausea Only  Vicodin [Hydrocodone-Acetaminophen] Nausea Only Social History Tobacco Use  Smoking status: Not on file Substance Use Topics  Alcohol use: Not on file No family history on file. Current Facility-Administered Medications Medication Dose Route Frequency  sodium chloride (NS) flush 5-40 mL  5-40 mL IntraVENous Q8H  
 enoxaparin (LOVENOX) injection 40 mg  40 mg SubCUTAneous Q24H  
 nicotine (NICODERM CQ) 21 mg/24 hr patch 1 Patch  1 Patch TransDERmal DAILY  methylPREDNISolone (PF) (Solu-MEDROL) injection 60 mg  60 mg IntraVENous Q6H  
 0.9% sodium chloride infusion  125 mL/hr IntraVENous CONTINUOUS  
 senna (SENOKOT) tablet 17.2 mg  2 Tab Oral QHS  pantoprazole (PROTONIX) tablet 40 mg  40 mg Oral DAILY  metoprolol tartrate (LOPRESSOR) tablet 50 mg  50 mg Oral BID  FLUoxetine (PROzac) capsule 60 mg  60 mg Oral DAILY  aspirin delayed-release tablet 81 mg  81 mg Oral DAILY  atorvastatin (LIPITOR) tablet 40 mg  40 mg Oral QHS  diazePAM (VALIUM) tablet 2.5 mg  2.5 mg Oral BID  dorzolamide (TRUSOPT) 2 % ophthalmic solution 1 Drop  1 Drop Both Eyes TID  ziprasidone (GEODON) capsule 40 mg  40 mg Oral BID WITH MEALS  doxycycline (VIBRAMYCIN) 100 mg in 0.9% sodium chloride (MBP/ADV) 100 mL  100 mg IntraVENous Q12H  
 brimonidine (ALPHAGAN) 0.2 % ophthalmic solution 1 Drop  1 Drop Both Eyes TID  budesonide (PULMICORT) 500 mcg/2 ml nebulizer suspension  500 mcg Nebulization BID RT  
 arformoteroL (BROVANA) neb solution 15 mcg  15 mcg Nebulization BID RT  
 melatonin tablet 9 mg  9 mg Oral QHS  prazosin (MINIPRESS) capsule 4 mg  4 mg Oral QHS  albuterol-ipratropium (DUO-NEB) 2.5 MG-0.5 MG/3 ML  3 mL Nebulization Q4H RT  
 guaiFENesin ER (MUCINEX) tablet 1,200 mg  1,200 mg Oral BID Review of Systems: A comprehensive review of systems was negative except for that written in the HPI. Objective:  
Vital Signs:   
Visit Vitals /63 (BP 1 Location: Right arm, BP Patient Position: At rest) Pulse 74 Temp 97.9 °F (36.6 °C) Resp 20 Ht 5' 9\" (1.753 m) Wt 68.1 kg (150 lb 2.1 oz) SpO2 94% BMI 22.17 kg/m² O2 Device: Nasal cannula O2 Flow Rate (L/min): 4 l/min Temp (24hrs), Av.9 °F (37.2 °C), Min:97.7 °F (36.5 °C), Max:101.6 °F (38.7 °C) Intake/Output:  
Last shift:       07 - 1900 In: 1260 [P.O.:1260] Out: 1250 [VOGAD:4563] Last 3 shifts: 03/10 1901 -  0700 In: 1072.5 [P.O.:560; I.V.:512.5] Out: 675 Cherelle Templeton Intake/Output Summary (Last 24 hours) at 3/12/2020 1550 Last data filed at 3/12/2020 1515 Gross per 24 hour Intake 2332.5 ml Output 1925 ml Net 407.5 ml Physical Exam:  
General:  Alert, awake Head:  NCAT Eyes:  EOMI Nose: Throat:   
Neck: Trachea midline Back:     
Lungs:   Diffuse wheeze, rhonchi Chest wall:    
Heart:  RRR Abdomen:   Soft, NT, ND, +bowel sounds Extremities: No edema Pulses:   
Skin:   
Lymph nodes: Neurologic: Oriented x 3 Data review:  
 
Recent Results (from the past 24 hour(s)) CULTURE, BLOOD Collection Time: 03/11/20 10:09 PM  
Result Value Ref Range Special Requests: NO SPECIAL REQUESTS Culture result: NO GROWTH AFTER 6 HOURS    
LACTIC ACID Collection Time: 03/11/20 10:09 PM  
Result Value Ref Range Lactic acid 2.0 0.4 - 2.0 MMOL/L  
URINALYSIS W/ RFLX MICROSCOPIC Collection Time: 03/11/20 10:09 PM  
Result Value Ref Range Color YELLOW/STRAW Appearance CLOUDY (A) CLEAR Specific gravity 1.009 1.003 - 1.030    
 pH (UA) 8.0 5.0 - 8.0 Protein NEGATIVE  NEG mg/dL Glucose NEGATIVE  NEG mg/dL Ketone NEGATIVE  NEG mg/dL Bilirubin NEGATIVE  NEG Blood NEGATIVE  NEG Urobilinogen 0.2 0.2 - 1.0 EU/dL Nitrites NEGATIVE  NEG Leukocyte Esterase NEGATIVE  NEG    
METABOLIC PANEL, COMPREHENSIVE Collection Time: 03/11/20 10:09 PM  
Result Value Ref Range Sodium 129 (L) 136 - 145 mmol/L Potassium 4.0 3.5 - 5.1 mmol/L Chloride 90 (L) 97 - 108 mmol/L  
 CO2 32 21 - 32 mmol/L Anion gap 7 5 - 15 mmol/L Glucose 114 (H) 65 - 100 mg/dL BUN 9 6 - 20 MG/DL Creatinine 0.83 0.70 - 1.30 MG/DL  
 BUN/Creatinine ratio 11 (L) 12 - 20 GFR est AA >60 >60 ml/min/1.73m2 GFR est non-AA >60 >60 ml/min/1.73m2 Calcium 8.9 8.5 - 10.1 MG/DL Bilirubin, total 0.5 0.2 - 1.0 MG/DL  
 ALT (SGPT) 30 12 - 78 U/L  
 AST (SGOT) 28 15 - 37 U/L Alk. phosphatase 77 45 - 117 U/L Protein, total 7.4 6.4 - 8.2 g/dL Albumin 4.0 3.5 - 5.0 g/dL Globulin 3.4 2.0 - 4.0 g/dL A-G Ratio 1.2 1.1 - 2.2    
CBC WITH AUTOMATED DIFF Collection Time: 03/11/20 10:09 PM  
Result Value Ref Range WBC 9.2 4.1 - 11.1 K/uL  
 RBC 4.29 4. 10 - 5.70 M/uL  
 HGB 14.1 12.1 - 17.0 g/dL HCT 41.1 36.6 - 50.3 % MCV 95.8 80.0 - 99.0 FL  
 MCH 32.9 26.0 - 34.0 PG  
 MCHC 34.3 30.0 - 36.5 g/dL  
 RDW 11.6 11.5 - 14.5 % PLATELET 798 442 - 260 K/uL MPV 8.8 (L) 8.9 - 12.9 FL  
 NRBC 0.0 0  WBC ABSOLUTE NRBC 0.00 0.00 - 0.01 K/uL NEUTROPHILS 82 (H) 32 - 75 % LYMPHOCYTES 5 (L) 12 - 49 % MONOCYTES 13 5 - 13 % EOSINOPHILS 0 0 - 7 % BASOPHILS 0 0 - 1 % IMMATURE GRANULOCYTES 0 0.0 - 0.5 % ABS. NEUTROPHILS 7.5 1.8 - 8.0 K/UL  
 ABS. LYMPHOCYTES 0.5 (L) 0.8 - 3.5 K/UL  
 ABS. MONOCYTES 1.2 (H) 0.0 - 1.0 K/UL  
 ABS. EOSINOPHILS 0.0 0.0 - 0.4 K/UL  
 ABS. BASOPHILS 0.0 0.0 - 0.1 K/UL  
 ABS. IMM. GRANS. 0.0 0.00 - 0.04 K/UL  
 DF AUTOMATED    
 RBC COMMENTS NORMOCYTIC, NORMOCHROMIC    
TROPONIN I Collection Time: 03/11/20 10:09 PM  
Result Value Ref Range Troponin-I, Qt. <0.05 <0.05 ng/mL CK W/ REFLX CKMB Collection Time: 03/11/20 10:09 PM  
Result Value Ref Range  39 - 308 U/L  
MAGNESIUM Collection Time: 03/11/20 10:09 PM  
Result Value Ref Range Magnesium 1.2 (L) 1.6 - 2.4 mg/dL SAMPLES BEING HELD Collection Time: 03/11/20 10:09 PM  
Result Value Ref Range SAMPLES BEING HELD [email or web address]   
 COMMENT Add-on orders for these samples will be processed based on acceptable specimen integrity and analyte stability, which may vary by analyte. URINE CULTURE HOLD SAMPLE Collection Time: 03/11/20 10:09 PM  
Result Value Ref Range Urine culture hold Urine on hold in Microbiology dept for 2 days. If unpreserved urine is submitted, it cannot be used for addtional testing after 24 hours, recollection will be required. CULTURE, BLOOD Collection Time: 03/11/20 10:24 PM  
Result Value Ref Range Special Requests: NO SPECIAL REQUESTS Culture result: NO GROWTH AFTER 5 HOURS    
RESPIRATORY PANEL,PCR,NASOPHARYNGEAL Collection Time: 03/11/20 10:24 PM  
Result Value Ref Range Adenovirus NOT DETECTED NOTD Coronavirus 229E NOT DETECTED NOTD Coronavirus HKU1 NOT DETECTED NOTD Coronavirus CVNL63 NOT DETECTED NOTD Coronavirus OC43 NOT DETECTED NOTD Metapneumovirus NOT DETECTED NOTD Rhinovirus and Enterovirus NOT DETECTED NOTD Influenza A NOT DETECTED NOTD Influenza A, subtype H1 NOT DETECTED NOTD Influenza A, subtype H3 NOT DETECTED NOTD INFLUENZA A H1N1 PCR NOT DETECTED NOTD Influenza B NOT DETECTED NOTD Parainfluenza 1 NOT DETECTED NOTD Parainfluenza 2 NOT DETECTED NOTD Parainfluenza 3 NOT DETECTED NOTD Parainfluenza virus 4 NOT DETECTED NOTD    
 RSV by PCR NOT DETECTED NOTD B. parapertussis, PCR NOT DETECTED NOTD Bordetella pertussis - PCR NOT DETECTED NOTD Chlamydophila pneumoniae DNA, QL, PCR NOT DETECTED NOTD Mycoplasma pneumoniae DNA, QL, PCR NOT DETECTED NOTD INFLUENZA A+B VIRAL AGS Collection Time: 03/12/20 12:19 AM  
Result Value Ref Range Influenza A Antigen NEGATIVE  NEG Influenza B Antigen NEGATIVE  NEG    
LACTIC ACID Collection Time: 03/12/20  1:44 AM  
Result Value Ref Range Lactic acid 2.6 (HH) 0.4 - 2.0 MMOL/L  
PROCALCITONIN Collection Time: 03/12/20  1:44 AM  
Result Value Ref Range Procalcitonin <0.05 ng/mL METABOLIC PANEL, COMPREHENSIVE Collection Time: 03/12/20  1:44 AM  
Result Value Ref Range Sodium 129 (L) 136 - 145 mmol/L Potassium 3.7 3.5 - 5.1 mmol/L Chloride 95 (L) 97 - 108 mmol/L  
 CO2 29 21 - 32 mmol/L Anion gap 5 5 - 15 mmol/L Glucose 173 (H) 65 - 100 mg/dL BUN 7 6 - 20 MG/DL Creatinine 0.84 0.70 - 1.30 MG/DL  
 BUN/Creatinine ratio 8 (L) 12 - 20 GFR est AA >60 >60 ml/min/1.73m2 GFR est non-AA >60 >60 ml/min/1.73m2 Calcium 7.8 (L) 8.5 - 10.1 MG/DL Bilirubin, total 0.2 0.2 - 1.0 MG/DL  
 ALT (SGPT) 25 12 - 78 U/L  
 AST (SGOT) 23 15 - 37 U/L Alk. phosphatase 66 45 - 117 U/L Protein, total 6.5 6.4 - 8.2 g/dL Albumin 3.5 3.5 - 5.0 g/dL Globulin 3.0 2.0 - 4.0 g/dL A-G Ratio 1.2 1.1 - 2.2    
CBC WITH AUTOMATED DIFF Collection Time: 03/12/20  1:44 AM  
Result Value Ref Range WBC 7.0 4.1 - 11.1 K/uL  
 RBC 3.80 (L) 4.10 - 5.70 M/uL  
 HGB 12.4 12.1 - 17.0 g/dL HCT 36.6 36.6 - 50.3 % MCV 96.3 80.0 - 99.0 FL  
 MCH 32.6 26.0 - 34.0 PG  
 MCHC 33.9 30.0 - 36.5 g/dL  
 RDW 11.6 11.5 - 14.5 % PLATELET 580 610 - 223 K/uL MPV 8.7 (L) 8.9 - 12.9 FL  
 NRBC 0.0 0  WBC ABSOLUTE NRBC 0.00 0.00 - 0.01 K/uL NEUTROPHILS 93 (H) 32 - 75 % LYMPHOCYTES 3 (L) 12 - 49 % MONOCYTES 4 (L) 5 - 13 % EOSINOPHILS 0 0 - 7 % BASOPHILS 0 0 - 1 % IMMATURE GRANULOCYTES 0 0.0 - 0.5 % ABS. NEUTROPHILS 6.5 1.8 - 8.0 K/UL  
 ABS. LYMPHOCYTES 0.2 (L) 0.8 - 3.5 K/UL  
 ABS. MONOCYTES 0.3 0.0 - 1.0 K/UL  
 ABS. EOSINOPHILS 0.0 0.0 - 0.4 K/UL  
 ABS. BASOPHILS 0.0 0.0 - 0.1 K/UL  
 ABS. IMM. GRANS. 0.0 0.00 - 0.04 K/UL  
 DF SMEAR SCANNED    
 RBC COMMENTS NORMOCYTIC, NORMOCHROMIC    
BLOOD GAS, ARTERIAL Collection Time: 03/12/20  3:27 AM  
Result Value Ref Range pH 7.37 7.35 - 7.45    
 PCO2 47 (H) 35.0 - 45.0 mmHg PO2 77 (L) 80 - 100 mmHg O2 SAT 95 92 - 97 % BICARBONATE 27 (H) 22 - 26 mmol/L  
 BASE EXCESS 0.6 mmol/L  
 O2 METHOD NASAL O2    
 O2 FLOW RATE 4 L/min Sample source ARTERIAL    
 SITE RR    
 DOROTHY'S TEST N/A    
LACTIC ACID Collection Time: 03/12/20  6:57 AM  
Result Value Ref Range Lactic acid 3.5 (HH) 0.4 - 2.0 MMOL/L Imaging: 
I have personally reviewed the patients radiographs and have reviewed the reports: 
  
 
  
Renetta Castellanos MD

## 2020-03-12 NOTE — PROGRESS NOTES
Spiritual Care Assessment/Progress Note [address] 
 
 
NAME: Robi Chilel      MRN: [id number] AGE: 81 y.o. SEX: male Hinduism Affiliation:   
Language: [place] 3/12/2020     Total Time (in minutes): 14 Spiritual Assessment begun in [facility] 3 PRO CARE TELE 2 through conversation with: 
  
    [x]Patient        [] Family    [] Friend(s) Reason for Consult: Initial/Spiritual assessment, patient floor Spiritual beliefs: (Please include comment if needed) [x] Identifies with a jade tradition:     
   [x] Supported by a jade community:        
   [] Claims no spiritual orientation:       
   [] Seeking spiritual identity:            
   [] Adheres to an individual form of spirituality:       
   [] Not able to assess:                   
 
    
Identified resources for coping:  
   [x] Prayer                           
   [] Music                  [] Guided Imagery [x] Family/friends                 [] Pet visits [] Devotional reading                         [] Unknown 
   [] Other:                                          
 
 
Interventions offered during this visit: (See comments for more details) Patient Interventions: Normalization of emotional/spiritual concerns, Affirmation of jade, Affirmation of emotions/emotional suffering, Initial/Spiritual assessment, patient floor, Prayer (actual) Plan of Care: 
 
 [] Support spiritual and/or cultural needs  
 [] Support AMD and/or advance care planning process    
 [] Support grieving process 
 [] Coordinate Rites and/or Rituals  
 [] Coordination with community clergy [] No spiritual needs identified at this time 
 [] Detailed Plan of Care below (See Comments)  [] Make referral to Music Therapy 
[] Make referral to Pet Therapy    
[] Make referral to Addiction services 
[] Make referral to [facility] 
[] Make referral to Spiritual Care Partner 
[] No future visits requested [x] Follow up visits as needed Comments:  responded to referral from pt's nurse to provide support to pt. Pt, Bensherri Twan, was in his bed, he seemed uncomfortable and restless. He said he was in pain and was having a rough time.  provided supportive presence and empathetic listening. Pt share his stories about his hospitalization and processed his emotions.  enquired what was Asher's source of coping, and he responded, Michelle Sotomayor.  affirmed pt's thoughts and feelings, offered requested prayer and advised him of pastoral care availability around the clock. Pt was grateful for the visit. Visited by: Shankar Alba. To sakina berg: [phone])

## 2020-03-12 NOTE — PROGRESS NOTES
Bedside and Verbal shift change report given to Jus (oncoming nurse) by Lamond Krabbe (offgoing nurse). Report included the following information SBAR, Kardex and Cardiac Rhythm NSR. Verbal shift change report given to Lamond Krabbe. (oncoming nurse) by Kellie Boyer (offgoing nurse). Report included the following information SBAR, Kardex and Cardiac Rhythm NSR.

## 2020-03-12 NOTE — PROGRESS NOTES
Admission Medication Reconciliation: 
  
Information obtained from:   
Detailed medication list located in the paper chart. Notes from interview between the nurse and the patient. Nurse left notes for pharmacist. 
Ana Alvarez data available¹:  No 
 
Comments/Recommendations:  
Please note that the nurse conducted the interview with the patient. Nurse provided pharmacist with notes as to when she had difficulty adding to or updating the PTA list. The patient provided the nurse with a detailed medication list which the nurse placed a copy of in the chart. Pharmacist reviewed the detailed list provided by the patient and the notes provided by the nurse. Pharmacist updated the PTA list based on these resources. Please note the pharmacist did not enter the patient's room. Updated PTA medication list 
 
¹[employer] pharmacy benefit data reflects medications filled and processed through the patient's insurance, however  
this data does NOT capture whether the medication was picked up or is currently being taken by the patient. Prior to Admission Medications Prescriptions Last Dose Informant Taking? FLUoxetine (PROzac) 20 mg capsule 3/11/2020 at 0900 Other Yes Sig: Take 60 mg by mouth daily. albuterol (PROVENTIL HFA, VENTOLIN HFA, PROAIR HFA) 90 mcg/actuation inhaler 3/11/2020 at 0900 Other Yes Sig: Take 2 Puffs by inhalation every four (4) hours as needed for Wheezing or Shortness of Breath. albuterol (PROVENTIL VENTOLIN) 2.5 mg /3 mL (0.083 %) nebu 3/11/2020 at Unknown time Other Yes Si.5 mg by Nebulization route every six (6) hours as needed for Wheezing. aspirin delayed-release 81 mg tablet 3/11/2020 at 0900 Other Yes Sig: Take 81 mg by mouth daily. atorvastatin (LIPITOR) 80 mg tablet 3/10/2020 at 2100 Other Yes Sig: Take 40 mg by mouth nightly. brinzolamide-brimonidine 1-0.2 % drps 3/11/2020 at Unknown time Other Yes Sig: Administer 1 Drop to both eyes three (3) times daily. budesonide-formoteroL (SYMBICORT) 160-4.5 mcg/actuation HFAA 3/11/2020 at 1600 Other Yes Sig: Take 2 Puffs by inhalation two (2) times a day. cholecalciferol (VITAMIN D3) (1000 Units /25 mcg) tablet 3/11/2020 at 0900 Other Yes Sig: Take 2,000 Units by mouth daily. denosumab (PROLIA) 60 mg/mL injection  Other Yes Si mg by SubCUTAneous route See Admin Instructions. One syringe subq every 6 months. Due 3/13   
diazePAM (VALIUM) 5 mg tablet 3/11/2020 at Unknown time Other Yes Sig: Take 2.5 mg by mouth daily. Indications: anxious  
diazePAM (VALIUM) 5 mg tablet 3/11/2020 at Unknown time Other Yes Sig: Take 5 mg by mouth nightly. Indications: anxious  
docusate sodium (COLACE) 100 mg capsule 3/11/2020 at 1600 Other Yes Sig: Take 100 mg by mouth three (3) times daily as needed for Constipation. guaiFENesin (ORGANIDIN) 200 mg tablet 3/11/2020 at 1600 Other Yes Sig: Take 200 mg by mouth every six (6) hours as needed for Congestion. ipratropium (ATROVENT) 0.02 % soln 3/11/2020 at Unknown time Other Yes Si.5 mg by Nebulization route every four (4) hours as needed (shortness of breath). melatonin 5 mg tablet 3/11/2020 at 2100 Other Yes Sig: Take 10 mg by mouth nightly. metoprolol tartrate (LOPRESSOR) 50 mg tablet 3/11/2020 at 1600 Other Yes Sig: Take 50 mg by mouth two (2) times a day. Patient takes in morning (0900) and afternoon (1600) pantoprazole (PROTONIX) 40 mg tablet 3/11/2020 at 0900 Other Yes Sig: Take 40 mg by mouth daily. pediatric multivitamins chewable tablet 3/11/2020 at Unknown time Other Yes Sig: Take 1 Tab by mouth daily. polyethylene glycol (MIRALAX) 17 gram packet 3/11/2020 at 0900 Other Yes Sig: Take 17 g by mouth daily as needed for Constipation. prazosin (MINIPRESS) 2 mg capsule 3/10/2020 Other Yes Sig: Take 4 mg by mouth nightly. predniSONE (DELTASONE) 5 mg tablet 3/11/2020 at Unknown time Other Yes Sig: Take 5 mg by mouth daily. psyllium (METAMUCIL) powd 3/11/2020 at 1600 Other Yes Sig: Take 1 Dose by mouth two (2) times a day. 2 teaspoons in liquid twice daily   
senna (SENOKOT) 8.6 mg tablet 3/10/2020 at 2100 Other Yes Sig: Take 2 Tabs by mouth nightly. ziprasidone (GEODON) 40 mg capsule 3/11/2020 at 1600 Other Yes Sig: Take 40 mg by mouth two (2) times daily (with meals). Facility-Administered Medications: None Please contact the main inpatient pharmacy with any questions or concerns at [phone] and we will direct you to the clinical pharmacist covering this patient's care while in-house.   
Carina Seals, PharmD, BCPS

## 2020-03-13 NOTE — PROGRESS NOTES
Bedside shift change report given to [facility] ANNMARIE (oncoming nurse) by april (offgoing nurse). Report included the following information SBAR.

## 2020-03-13 NOTE — PROGRESS NOTES
[facility]is Thomas[facility] 79 
[address] 
[phone] Medical Progress Note NAME: Marcie Guadalupe :  1950 MRM:  [id number] Date of service: 3/13/2020 Subjective: Chief Complaint:  F/u sob Overnight respiratory status worsened. Did not wear bipap for a period of time due to intolerance. Willing to use bipap this morning Objective:  
 
 
Vitals:  
 
 
  
Last 24hrs VS reviewed since prior progress note. Most recent are: 
 
Visit Vitals /65 (BP 1 Location: Right arm, BP Patient Position: At rest;Lying left side) Pulse 83 Temp 97.7 °F (36.5 °C) Resp 16 Ht 5' 9\" (1.753 m) Wt 66.3 kg (146 lb 3.2 oz) SpO2 100% BMI 21.59 kg/m² SpO2 Readings from Last 6 Encounters:  
20 100% O2 Flow Rate (L/min): 35 l/min Intake/Output Summary (Last 24 hours) at 3/13/2020 1308 Last data filed at 3/13/2020 1252 Gross per 24 hour Intake 4955.34 ml Output 5125 ml Net -169.66 ml Exam:  
 
Physical Exam: 
 
Gen:  Chronically ill appearing, in mild acute distress HEENT:  Pink conjunctivae, PERRL, hearing intact to voice, moist mucous membranes Neck:  Supple, without masses, thyroid non-tender Resp:  No accessory muscle use, diffuse wheezing and rhonchi bilateral -- no improvement from yesterday Card:  No murmurs, normal S1, S2 without thrills, bruits or peripheral edema Abd:  Soft, non-tender, non-distended, normoactive bowel sounds are present Musc:  No cyanosis or clubbing Skin:  No rashes or ulcers, skin turgor is good Neuro:  No focal deficits, follows commands appropriately Psych:  Good insight, oriented to person, place and time, alert Medications Reviewed: (see below) Lab Data Reviewed: (see below) 
 
______________________________________________________________________ Medications:  
 
Current Facility-Administered Medications Medication Dose Route Frequency  bumetanide (BUMEX) injection 1 mg  1 mg IntraVENous BID  [START ON 3/14/2020] pantoprazole (PROTONIX) tablet 40 mg  40 mg Oral ACB  sodium chloride (NS) flush 5-40 mL  5-40 mL IntraVENous Q8H  
 sodium chloride (NS) flush 5-40 mL  5-40 mL IntraVENous PRN  
 ondansetron (ZOFRAN) injection 4 mg  4 mg IntraVENous Q4H PRN  
 enoxaparin (LOVENOX) injection 40 mg  40 mg SubCUTAneous Q24H  
 nicotine (NICODERM CQ) 21 mg/24 hr patch 1 Patch  1 Patch TransDERmal DAILY  methylPREDNISolone (PF) (Solu-MEDROL) injection 60 mg  60 mg IntraVENous Q6H  
 senna (SENOKOT) tablet 17.2 mg  2 Tab Oral QHS  metoprolol tartrate (LOPRESSOR) tablet 50 mg  50 mg Oral BID  FLUoxetine (PROzac) capsule 60 mg  60 mg Oral DAILY  docusate sodium (COLACE) capsule 100 mg  100 mg Oral TID PRN  
 aspirin delayed-release tablet 81 mg  81 mg Oral DAILY  atorvastatin (LIPITOR) tablet 40 mg  40 mg Oral QHS  acetaminophen (TYLENOL) tablet 650 mg  650 mg Oral Q6H PRN  
 diazePAM (VALIUM) tablet 2.5 mg  2.5 mg Oral BID  dorzolamide (TRUSOPT) 2 % ophthalmic solution 1 Drop  1 Drop Both Eyes TID  ziprasidone (GEODON) capsule 40 mg  40 mg Oral BID WITH MEALS  doxycycline (VIBRAMYCIN) 100 mg in 0.9% sodium chloride (MBP/ADV) 100 mL  100 mg IntraVENous Q12H  
 brimonidine (ALPHAGAN) 0.2 % ophthalmic solution 1 Drop  1 Drop Both Eyes TID  budesonide (PULMICORT) 500 mcg/2 ml nebulizer suspension  500 mcg Nebulization BID RT  
 arformoteroL (BROVANA) neb solution 15 mcg  15 mcg Nebulization BID RT  
 melatonin tablet 9 mg  9 mg Oral QHS  prazosin (MINIPRESS) capsule 4 mg  4 mg Oral QHS  albuterol-ipratropium (DUO-NEB) 2.5 MG-0.5 MG/3 ML  3 mL Nebulization Q4H RT  
 guaiFENesin ER (MUCINEX) tablet 1,200 mg  1,200 mg Oral BID  sodium chloride (NS) flush 5-10 mL  5-10 mL IntraVENous PRN Lab Review:  
 
Recent Labs  
  03/13/20 
0206 03/12/20 
0144 03/11/20 
2209 WBC 7.4 7.0 9.2 HGB 12.4 12.4 14.1 HCT 37.3 36.6 41.1  199 233 Recent Labs  
  03/13/20 
0206 03/12/20 
0144 03/11/20 
2209 * 129* 129*  
K 4.5 3.7 4.0  
CL 99 95* 90* CO2 32 29 32 * 173* 114* BUN 10 7 9 CREA 0.64* 0.84 0.83 CA 8.1* 7.8* 8.9 MG  --   --  1.2* ALB  --  3.5 4.0  
SGOT  --  23 28 ALT  --  25 30 No components found for: [place] Assessment / Plan:  
 
Acute on chronic respiratory failure with hypoxia: 2/2 COPD exacerbation. No PE on CTA chest. No pna on imaging. Continuing to worsen despite aggressive therapy. Order bumex due to elevated BNP. Continue oxygen to maintain saturations >90 
 
COPD exacerbation: likely viral. No pna on imaging. Flu negative; viral panel negative. Changed azithro to doxy due to QTc concerns. Continue pulmicort/brovana. Continue scheduled duonebs and solumedrol. 
--requested COVID testing, declined by [facility] as pt does not meet current criteria. Will get daily CXR if develops e/o interstitial pna would re-address testing. Place back on droplet precautions SIRS / Elevated lactic acid: received sepsis bolus in ER; improvedrend lactic acid. No source of infection thus far other than possible viral 
 
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 Hesitant to stop prozac as anxiety appears to be significant. Can implement fluid restriction once lactate improves Anxiety: severe and contributing to sob this morning. Continue prozac. Order home valium and geodon HTN / HLD: continue bp meds and statin Total time spent with patient: 36 Minutes Care Plan discussed with: Patient Discussed:  Care Plan Prophylaxis:  Lovenox Disposition:  Home w/Family 
        
___________________________________________________ Attending Physician: Brock Kimball MD

## 2020-03-13 NOTE — PROGRESS NOTES
[facility], Critical Care, [facility] Sleep Medicine Initial Patient Consult Name: Laine Bojorquez MRN: [id number] : 1950 Hospital: [address] [place] [address] Date: 3/13/2020 IMPRESSION:  
· Acute on chronic hypoxemic/hypercapneic respiratory failure · Advanced COPD w/ acute exacerbation · Hyponatremia · Fever · Hyperglycemia RECOMMENDATIONS:  
· Placed back on NIPPV at 8:30; recheck ABG at 10:30 
· Supplemental O2 as needed to keep sats > 88% · Continue scheduled duonebs, pulmicort, brovana · Continue current dose of IV steroids · Increase mucinex, add flutter valve · Follow cultures · Decrease IVF · Continue doxy · Certainly not unreasonable to test for COVID-19; however, does not meet criteria per the health dept · Speech eval 
· On protonix for GERD 
· On lovenox for DVT ppx · Will consult palliative to see Patient is critically ill, requiring BiPap for acute hypercapnic respiratory failure. Total CC time: 35 minutes EOP and without provider overlap. Subjective:  
 
Asked to see Mr. Geoffrey Lin by Dr. Julieth Castañeda for evaluation of COPD. Mr. Geoffrey Lin is a 72yo male w/ history of chronic hypoxemic respiratory failure (on 4L at baseline), COPD and anxiety who presented to the ER on 3/11 w/ complaints of shortness of breath x 2 weeks, wheezing, cough productive of brown sputum, pleuritic chest pain and sore throat. Denies sick contacts or recent travel. Does report frequent coughing while eating/drinking. Is mostly homebound but gets to the [facility]  for appointments and was last there on Monday. He is followed by pulmonary at the [facility] -- sees Dr. Onur Arriaga. Smokes 1ppd (down from 3ppd) since age 15. Overnight Events Afebrile overnight BP stable O2 sats 99% on BiPap Pro-BNP 2,976 Blood cultures x 2 days RVP negative ABG 7.21/83/291/32/100% on NC: placed back on BiPap CXR personally reviewed:  No  change  since  yesterday. Mild  interstitial  pulmonary  edema. Probable  small<BR>left  pleural  effusion. No past medical history on file. No past surgical history on file. Prior to Admission medications Medication Sig Start Date End Date Taking? Authorizing Provider  
predniSONE (DELTASONE) 5 mg tablet Take 5 mg by mouth daily. Yes Provider, Historical  
albuterol (PROVENTIL HFA, VENTOLIN HFA, PROAIR HFA) 90 mcg/actuation inhaler Take 2 Puffs by inhalation every four (4) hours as needed for Wheezing or Shortness of Breath. Yes Provider, Historical  
aspirin delayed-release 81 mg tablet Take 81 mg by mouth daily. Yes Provider, Historical  
atorvastatin (LIPITOR) 80 mg tablet Take 40 mg by mouth nightly. Yes Provider, Historical  
docusate sodium (COLACE) 100 mg capsule Take 100 mg by mouth three (3) times daily as needed for Constipation. Yes Provider, Historical  
senna (SENOKOT) 8.6 mg tablet Take 2 Tabs by mouth nightly. Yes Provider, Historical  
psyllium (METAMUCIL) powd Take 1 Dose by mouth two (2) times a day. 2 teaspoons in liquid twice daily    Yes Provider, Historical  
diazePAM (VALIUM) 5 mg tablet Take 2.5 mg by mouth daily. Indications: anxious   Yes Provider, Historical  
cholecalciferol (VITAMIN D3) (1000 Units /25 mcg) tablet Take 2,000 Units by mouth daily. Yes Provider, Historical  
denosumab (PROLIA) 60 mg/mL injection 60 mg by SubCUTAneous route See Admin Instructions. One syringe subq every 6 months. Due 3/13    Yes Provider, Historical  
ipratropium (ATROVENT) 0.02 % soln 0.5 mg by Nebulization route every four (4) hours as needed (shortness of breath). Yes Provider, Historical  
guaiFENesin (ORGANIDIN) 200 mg tablet Take 200 mg by mouth every six (6) hours as needed for Congestion.    Yes Provider, Historical  
metoprolol tartrate (LOPRESSOR) 50 mg tablet Take 50 mg by mouth two (2) times a day. Patient takes in morning (0900) and afternoon (1600)   Yes Provider, Historical  
albuterol (PROVENTIL VENTOLIN) 2.5 mg /3 mL (0.083 %) nebu 2.5 mg by Nebulization route every six (6) hours as needed for Wheezing. Yes Provider, Historical  
budesonide-formoteroL (SYMBICORT) 160-4.5 mcg/actuation HFAA Take 2 Puffs by inhalation two (2) times a day. Yes Provider, Historical  
polyethylene glycol (MIRALAX) 17 gram packet Take 17 g by mouth daily as needed for Constipation. Yes Provider, Historical  
pantoprazole (PROTONIX) 40 mg tablet Take 40 mg by mouth daily. Yes Provider, Historical  
melatonin 5 mg tablet Take 10 mg by mouth nightly. Yes Provider, Historical  
prazosin (MINIPRESS) 2 mg capsule Take 4 mg by mouth nightly. Yes Provider, Historical  
ziprasidone (GEODON) 40 mg capsule Take 40 mg by mouth two (2) times daily (with meals). Yes Provider, Historical  
FLUoxetine (PROzac) 20 mg capsule Take 60 mg by mouth daily. Yes Provider, Historical  
pediatric multivitamins chewable tablet Take 1 Tab by mouth daily. Yes Provider, Historical  
brinzolamide-brimonidine 1-0.2 % drps Administer 1 Drop to both eyes three (3) times daily. Yes Provider, Historical  
diazePAM (VALIUM) 5 mg tablet Take 5 mg by mouth nightly. Indications: anxious   Yes Provider, Historical  
 
Allergies Allergen Reactions  Hydrocodone Nausea Only  Oxycodone Nausea Only  Percocet [Oxycodone-Acetaminophen] Nausea Only  Vicodin [Hydrocodone-Acetaminophen] Nausea Only Social History Tobacco Use  Smoking status: Not on file Substance Use Topics  Alcohol use: Not on file No family history on file. Current Facility-Administered Medications Medication Dose Route Frequency  bumetanide (BUMEX) injection 1 mg  1 mg IntraVENous BID  sodium chloride (NS) flush 5-40 mL  5-40 mL IntraVENous Q8H  
 enoxaparin (LOVENOX) injection 40 mg  40 mg SubCUTAneous Q24H  nicotine (NICODERM CQ) 21 mg/24 hr patch 1 Patch  1 Patch TransDERmal DAILY  methylPREDNISolone (PF) (Solu-MEDROL) injection 60 mg  60 mg IntraVENous Q6H  
 0.9% sodium chloride infusion  125 mL/hr IntraVENous CONTINUOUS  
 senna (SENOKOT) tablet 17.2 mg  2 Tab Oral QHS  pantoprazole (PROTONIX) tablet 40 mg  40 mg Oral DAILY  metoprolol tartrate (LOPRESSOR) tablet 50 mg  50 mg Oral BID  FLUoxetine (PROzac) capsule 60 mg  60 mg Oral DAILY  aspirin delayed-release tablet 81 mg  81 mg Oral DAILY  atorvastatin (LIPITOR) tablet 40 mg  40 mg Oral QHS  diazePAM (VALIUM) tablet 2.5 mg  2.5 mg Oral BID  dorzolamide (TRUSOPT) 2 % ophthalmic solution 1 Drop  1 Drop Both Eyes TID  ziprasidone (GEODON) capsule 40 mg  40 mg Oral BID WITH MEALS  doxycycline (VIBRAMYCIN) 100 mg in 0.9% sodium chloride (MBP/ADV) 100 mL  100 mg IntraVENous Q12H  
 brimonidine (ALPHAGAN) 0.2 % ophthalmic solution 1 Drop  1 Drop Both Eyes TID  budesonide (PULMICORT) 500 mcg/2 ml nebulizer suspension  500 mcg Nebulization BID RT  
 arformoteroL (BROVANA) neb solution 15 mcg  15 mcg Nebulization BID RT  
 melatonin tablet 9 mg  9 mg Oral QHS  prazosin (MINIPRESS) capsule 4 mg  4 mg Oral QHS  albuterol-ipratropium (DUO-NEB) 2.5 MG-0.5 MG/3 ML  3 mL Nebulization Q4H RT  
 guaiFENesin ER (MUCINEX) tablet 1,200 mg  1,200 mg Oral BID Review of Systems: A comprehensive review of systems was negative except for that written in the HPI. Objective:  
Vital Signs:   
Visit Vitals /60 (BP 1 Location: Right arm, BP Patient Position: At rest;Sitting) Pulse 80 Temp 97.7 °F (36.5 °C) Resp 22 Ht 5' 9\" (1.753 m) Wt 66.3 kg (146 lb 3.2 oz) SpO2 99% BMI 21.59 kg/m² O2 Device: BIPAP  
O2 Flow Rate (L/min): 35 l/min Temp (24hrs), Av.9 °F (36.6 °C), Min:97.6 °F (36.4 °C), Max:98.3 °F (36.8 °C) Intake/Output:  
Last shift:      701 - 1900 In: 240 [P.O.:240] Out: 100 [Urine:100] Last 3 shifts: 03/11 1901 - 03/13 0700 In: 6075.8 [P.O.:2380; I.V.:3695.8] Out: 5300 [Urine:5300] Intake/Output Summary (Last 24 hours) at 3/13/2020 1012 Last data filed at 3/13/2020 1667 Gross per 24 hour Intake 4943.34 ml Output 4575 ml Net 368.34 ml Physical Exam:  
General:  Sleeping but arousable, on BiPap Head:  NCAT Eyes:  EOMI Nose: Throat:   
Neck: Trachea midline Back:     
Lungs:   Scattered wheeze and rhonchi Chest wall:    
Heart:  RRR Abdomen:   Soft, NT, ND, +bowel sounds Extremities: No edema Pulses:   
Skin:   
Lymph nodes:   
Neurologic: Oriented x 3 Data review:  
 
Recent Results (from the past 24 hour(s)) BLOOD GAS, ARTERIAL Collection Time: 03/12/20  3:30 PM  
Result Value Ref Range pH 7.31 (L) 7.35 - 7.45    
 PCO2 58 (H) 35.0 - 45.0 mmHg PO2 70 (L) 80 - 100 mmHg O2 SAT 92 92 - 97 % BICARBONATE 29 (H) 22 - 26 mmol/L  
 BASE EXCESS 1.2 mmol/L  
 O2 METHOD NASAL O2    
 O2 FLOW RATE 5 L/min Sample source ARTERIAL    
 SITE RR    
 DOROTHY'S TEST YES    
LACTIC ACID Collection Time: 03/12/20  5:10 PM  
Result Value Ref Range Lactic acid 1.8 0.4 - 2.0 MMOL/L  
CBC W/O DIFF Collection Time: 03/13/20  2:06 AM  
Result Value Ref Range WBC 7.4 4.1 - 11.1 K/uL  
 RBC 3.75 (L) 4.10 - 5.70 M/uL  
 HGB 12.4 12.1 - 17.0 g/dL HCT 37.3 36.6 - 50.3 % MCV 99.5 (H) 80.0 - 99.0 FL  
 MCH 33.1 26.0 - 34.0 PG  
 MCHC 33.2 30.0 - 36.5 g/dL  
 RDW 12.0 11.5 - 14.5 % PLATELET 041 055 - 509 K/uL MPV 8.4 (L) 8.9 - 12.9 FL  
 NRBC 0.0 0  WBC ABSOLUTE NRBC 0.00 0.00 - 0.01 K/uL METABOLIC PANEL, BASIC Collection Time: 03/13/20  2:06 AM  
Result Value Ref Range Sodium 134 (L) 136 - 145 mmol/L Potassium 4.5 3.5 - 5.1 mmol/L Chloride 99 97 - 108 mmol/L  
 CO2 32 21 - 32 mmol/L Anion gap 3 (L) 5 - 15 mmol/L Glucose 165 (H) 65 - 100 mg/dL  BUN 10 6 - 20 MG/DL  
 Creatinine 0.64 (L) 0.70 - 1.30 MG/DL  
 BUN/Creatinine ratio 16 12 - 20 GFR est AA >60 >60 ml/min/1.73m2 GFR est non-AA >60 >60 ml/min/1.73m2 Calcium 8.1 (L) 8.5 - 10.1 MG/DL  
LACTIC ACID Collection Time: 03/13/20  2:06 AM  
Result Value Ref Range Lactic acid 1.0 0.4 - 2.0 MMOL/L  
TSH 3RD GENERATION Collection Time: 03/13/20  2:06 AM  
Result Value Ref Range TSH 0.47 0.36 - 3.74 uIU/mL NT-PRO BNP Collection Time: 03/13/20  2:06 AM  
Result Value Ref Range NT pro-BNP 2,976 (H) <125 PG/ML  
BLOOD GAS, ARTERIAL Collection Time: 03/13/20  7:45 AM  
Result Value Ref Range pH 7.21 (LL) 7.35 - 7.45    
 PCO2 83 (H) 35.0 - 45.0 mmHg PO2 291 (H) 80 - 100 mmHg O2  (H) 92 - 97 % BICARBONATE 32 (H) 22 - 26 mmol/L  
 BASE EXCESS 1.8 mmol/L  
 O2 METHOD NASAL O2    
 O2 FLOW RATE 35 L/min FIO2 100 % Sample source ARTERIAL    
 SITE RIGHT RADIAL DOROTHY'S TEST YES Critical value read back CVNICKY CHRISTIAN MD @ 0763 Imaging: 
I have personally reviewed the patients radiographs and have reviewed the reports: 
  
 
  
Rian Acosta NP

## 2020-03-13 NOTE — PROGRESS NOTES
3- CASE MANAGEMENT NOTE: 
PLAN: 
1. Monitor patient response to treatment and recommendations. 2. Await further evaluation. 3. [facility] 4. CM to monitor for discharge needs. 5. Re-admission Risk 18% I met with the patient to introduce myself and explain the role of case management. He confirmed that his ex-wife, Wen Vilchis ([phone]), lives with him in his 2-story house. He has a first floor bathroom and sleeps on a couch on the first floor. She assists him with his ADL's. He has a rollator but does not ambulate distances just in the house, a nebulizer and oxygen from the [place] (100% service connected disability). Either his family or the [place] provide transportation. He was open to [facility] for RN services-just closed the PT/OT services. He will need an order to resume home health RN services and please add PT/OT if needed. CM will continue to follow and assist as needed.  
 
Theodore Sanabria, YARAW, CM

## 2020-03-13 NOTE — ROUTINE PROCESS
5452 
Patient oxygen drops with movement. Having difficulty maintaining above 90% even at rest.  Increase oxygen to 6 L with little response. Patient agreeable to trying bipap. Notified respiratory therapy. 0046 Listened to patient's lung sounds as well as another nurse for comparison. Lung sounds are very diminished. Can hear expiratory wheezing but little air movement. Does not appear in distress but states he is having difficulty getting air in. Coughs with deep breaths. Having difficulty maintaining above 85% on the 6 L. Discussed my findings and concerns with Dr. Geni Arana.  New orders provided. 1077 [facility] Improvement immediately with bipap. Now 100%. 4210 Patient wanted to come off bipap. Applied 6 L via NC. Patient oxygen dropped to upper 70's. Notified respiratory therapy of findings and high flow was set up and now 93%. Notified Dr. Geni Arana and patient level of care was upgraded. 1246 Bedside and Verbal shift change report given to Mira Titus (oncoming nurse) by Kerline Hinojosa (offgoing nurse). Report included the following information SBAR, Kardex, Intake/Output, MAR, Recent Results and Cardiac Rhythm NSR.

## 2020-03-13 NOTE — PROGRESS NOTES
Speech pathology note Patient with worsened respiratory status overnight and now on bipap. Patient not appropriate for swallowing evaluation at this time secondary to respiratory status. SLP will follow up Monday for evaluation as medically appropriate. Thank you. Michelle Flores., CCC-SLP

## 2020-03-13 NOTE — PROGRESS NOTES
Palliative Medicine Social Work Note Palliative Medicine (NP Salvatore, [address] [facility]) visited briefly with pt, no family present. Pt was resting in bed with bipap in place, did not feel up to lengthy discussion. Pt lives with ex-wife Chinyere Díaz, receives much of his medical care at the [place]. Pt stated his dtr (Bridgett Campbell) would be his surrogate decision maker if he is unable to speak for himself. Palliative team will attempt follow up when pt is better able to engage in goals of care conversation and/or when family is present. Thank you for including Palliative Medicine in the care of Mr. Selene Galindo. [address] PARAMJIT Rogel, [facility] Palliative Medicine:  [phone])

## 2020-03-13 NOTE — PROGRESS NOTES
Problem: Falls - Risk of 
Goal: *Absence of Falls Description: Document Anel Nunn Fall Risk and appropriate interventions in the flowsheet. Outcome: Progressing Towards Goal 
Note: Fall Risk Interventions: 
Mobility Interventions: Bed/chair exit alarm, OT consult for ADLs, Patient to call before getting OOB, PT Consult for mobility concerns, PT Consult for assist device competence Medication Interventions: Bed/chair exit alarm, Patient to call before getting OOB, Teach patient to arise slowly Elimination Interventions: Bed/chair exit alarm, Call light in reach, Patient to call for help with toileting needs, Stay With Me (per policy) History of Falls Interventions: Bed/chair exit alarm, Investigate reason for fall, Door open when patient unattended Problem: Patient Education: Go to Patient Education Activity Goal: Patient/Family Education Outcome: Progressing Towards Goal 
  
Problem: Risk for Spread of Infection Goal: Prevent transmission of infectious organism to others Description: Prevent the transmission of infectious organisms to other patients, staff members, and visitors. Outcome: Progressing Towards Goal 
  
Problem: Patient Education:  Go to Education Activity Goal: Patient/Family Education Outcome: Progressing Towards Goal 
  
Problem: Pressure Injury - Risk of 
Goal: *Prevention of pressure injury Description: Document Andre Scale and appropriate interventions in the flowsheet. Outcome: Progressing Towards Goal 
Note: Pressure Injury Interventions: Activity Interventions: Increase time out of bed, PT/OT evaluation Mobility Interventions: Turn and reposition approx. every two hours(pillow and wedges) Nutrition Interventions: Document food/fluid/supplement intake Friction and Shear Interventions: Apply protective barrier, creams and emollients Problem: Patient Education: Go to Patient Education Activity Goal: Patient/Family Education Outcome: Progressing Towards Goal

## 2020-03-14 NOTE — PROGRESS NOTES
Problem: Falls - Risk of 
Goal: *Absence of Falls Description: Document Ranjan Ryder Fall Risk and appropriate interventions in the flowsheet. Outcome: Progressing Towards Goal 
Note: Fall Risk Interventions: 
Mobility Interventions: Bed/chair exit alarm, Patient to call before getting OOB, PT Consult for mobility concerns, OT consult for ADLs, PT Consult for assist device competence, Strengthening exercises (ROM-active/passive) Medication Interventions: Bed/chair exit alarm, Patient to call before getting OOB, Teach patient to arise slowly Elimination Interventions: Bed/chair exit alarm, Call light in reach, Patient to call for help with toileting needs, Stay With Me (per policy), Toilet paper/wipes in reach, Urinal in reach History of Falls Interventions: Bed/chair exit alarm, Consult care management for discharge planning, Room close to nurse's station, Evaluate medications/consider consulting pharmacy, Door open when patient unattended Problem: Patient Education: Go to Patient Education Activity Goal: Patient/Family Education Outcome: Progressing Towards Goal 
  
Problem: Risk for Spread of Infection Goal: Prevent transmission of infectious organism to others Description: Prevent the transmission of infectious organisms to other patients, staff members, and visitors. Outcome: Progressing Towards Goal 
  
Problem: Patient Education:  Go to Education Activity Goal: Patient/Family Education Outcome: Progressing Towards Goal 
  
Problem: Pressure Injury - Risk of 
Goal: *Prevention of pressure injury Description: Document Andre Scale and appropriate interventions in the flowsheet. Outcome: Progressing Towards Goal 
Note: Pressure Injury Interventions: Activity Interventions: Assess need for specialty bed, Increase time out of bed, Pressure redistribution bed/mattress(bed type), PT/OT evaluation Mobility Interventions: Assess need for specialty bed, HOB 30 degrees or less, Pressure redistribution bed/mattress (bed type), PT/OT evaluation Nutrition Interventions: Document food/fluid/supplement intake, Discuss nutritional consult with provider, Offer support with meals,snacks and hydration Friction and Shear Interventions: Apply protective barrier, creams and emollients, Foam dressings/transparent film/skin sealants, HOB 30 degrees or less, Lift sheet, Minimize layers Problem: Patient Education: Go to Patient Education Activity Goal: Patient/Family Education Outcome: Progressing Towards Goal

## 2020-03-14 NOTE — CONSULTS
Palliative Medicine Consult [place]: [phone]) Patient Name: Deidre Valdez YOB: 1950 Date of Initial Consult: 3/13/2020 Reason for Consult: ES Disease Requesting Provider: Scott SMILEY Primary Care Physician: None SUMMARY:  
Deidre Valdez is a 81 y.o. with a past history of COPD/GOLD3 on O2 4L continuous, +active tobacco use, HLD, HTN, Anxiety who was admitted on 3/11/2020 from home with a diagnosis of Acute Hypercapnic Respiratory Failure. Current medical issues leading to Palliative Medicine involvement include: GOC discussion in setting of ES COPD. Patient presented to Er w/ cc of worsening SOB x 2 weeks, with associated wheezing, productive cough and pain. Patient also endorsed a cough secondary to cough as well as episode of diarrhea earlier day of admission. In ER patient observed to be thin, chronically ill appearing and in respiratory distress, he had + temp 101.6, labs w/ Na 129, creatinine and albumin WNL. Patient required 4L O2. Respiratory panel returned negative. Patient started on empiric Abx and was admitted. Course of hospitalization: O2 needs increased requiring bipap. 3/13 CXR mild interstitial pulmonary edema and small right pleural effusion, ABG abnormal, CO2 83. PALLIATIVE DIAGNOSES:  
1. Advanced care planning discussion 2. Goals of care discussion 3. DNR discussion 4. Shortness of breath 5. Debility PLAN:  
1. Prior to visit completed chart review. 2. Spoke with patients nurse Conchita ROCHA prior to visit. 3. Palliative LCSW keo bueno I met with patient , no family at bedside. Patient was in bed, initially appeared to be sleeping had bipap mask on. He did wake fairly easily, made eye contact, but difficulty understanding him on bipap. We introduced ourselves and role of palliative medicine 4. Advanced care planning discussion- No AMD in EMR.  When asked if he had an AMD and who he would want to make his health care decisions, he stated that he would want his daughter, Shannon De León to be his health care decision maker. 5. Goals of care discussion- We were not able to have full discussion today due to limitation patient had with bipap. palliative will continue to follow and attempt to readdress GOC. 6. DNR discussion- patient with a full code status 7. Shortness of breath- requiring bipap, not at baseline, has advanced COPD, continues to smoke. 8.  Initial consult note routed to primary continuity provider and/or primary health care team members 9. Communicated plan of care with: Palliative IDT, [facility] Team, unit nurse April RN 
 
 GOALS OF CARE / TREATMENT PREFERENCES:  
 
GOALS OF CARE: continue with full restorative tx and interventions at this time TREATMENT PREFERENCES:  
Code Status: Full Code Advance Care Planning: 
[x] The [facility] Interdisciplinary Team has updated the ACP Navigator with [address] and Patient Capacity Advance Care Planning 3/14/2020 Patient's Healthcare Decision Maker is: Legal Next of Kin Confirm Advance Directive None Patient Would Like to Complete Advance Directive Unable Medical Interventions: Full interventions Other Instructions: Other: As far as possible, the palliative care team has discussed with patient / health care proxy about goals of care / treatment preferences for patient. HISTORY:  
 
History obtained from: medical records/nurse CHIEF COMPLAINT: cold HPI/SUBJECTIVE: The patient is:  
[] Verbal and participatory [x] Non-participatory due to:  
Patient somewhat verbal but had bipap on and was difficult to understand, he was also fatigued, stated he was cold, asked for blanket, denied pain, patient not able to to provide ROS Clinical Pain Assessment (nonverbal scale for severity on nonverbal patients):  
Clinical Pain Assessment Severity: 0 Location: denied pain Character: denied pain Duration: denied pain Effect: denied pain Factors: denied pain Frequency: denied pain Activity (Movement): Lying quietly, normal position Duration: for how long has pt been experiencing pain (e.g., 2 days, 1 month, years) Frequency: how often pain is an issue (e.g., several times per day, once every few days, constant) FUNCTIONAL ASSESSMENT:  
 
Palliative Performance Scale (PPS): PPS: 40 PSYCHOSOCIAL/SPIRITUAL SCREENING:  
 
Palliative IDT has assessed this patient for cultural preferences / practices and a referral made as appropriate to needs (Cultural Services, Patient Advocacy, Ethics, etc.) Any spiritual / Samaritan concerns: 
[] Yes /  [x] No 
 
Caregiver Burnout: 
[] Yes /  [x] No /  [x] No Caregiver Present Anticipatory grief assessment:  
[x] Normal  / [] Maladaptive ESAS Anxiety: ESAS Depression:    
 
 
 REVIEW OF SYSTEMS:  
 
Positive and pertinent negative findings in ROS are noted above in HPI. The following systems were [] reviewed / [x] unable to be reviewed as noted in HPI Other findings are noted below. Systems: constitutional, ears/nose/mouth/throat, respiratory, gastrointestinal, genitourinary, musculoskeletal, integumentary, neurologic, psychiatric, endocrine. Positive findings noted below. Modified ESAS Completed by: provider Fatigue: 9 Drowsiness: 2 Pain: 0 Dyspnea: 8 PHYSICAL EXAM:  
 
From RN flowsheet: 
Wt Readings from Last 3 Encounters:  
03/13/20 146 lb 3.2 oz (66.3 kg) Blood pressure 115/56, pulse (!) 56, temperature 97.8 °F (36.6 °C), resp. rate 22, height 5' 9\" (1.753 m), weight 146 lb 3.2 oz (66.3 kg), SpO2 97 %. Pain Scale 1: Numeric (0 - 10) Pain Intensity 1: 0 Pain Onset 1: chronic Pain Location 1: Back, Neck Pain Orientation 1: Posterior, Medial, Upper Pain Description 1: Gregorio Tucker Pain Intervention(s) 1: Medication (see MAR), Position Last bowel movement, if known:  
 
Constitutional: thin, frail, chronically ill appearing Eyes: pupils equal, anicteric ENMT: bipap Cardiovascular: regular rhythm, distal pulses intact Respiratory: breathing not labored, symmetric Gastrointestinal: soft non-tender, +bowel sounds Musculoskeletal: no deformity, no tenderness to palpation Skin: warm, dry Neurologic: fatigued, drowsy, able to following commands, weak, moving all extremities Psychiatric:unable to assess due to barrier in communication with patient having bipap mask on HISTORY:  
 
Active Problems: 
  Acute on chronic respiratory failure with hypoxia ([address] (3/11/2020) No past medical history on file. No past surgical history on file. No family history on file. History reviewed, no pertinent family history. Social History Tobacco Use  Smoking status: Not on file Substance Use Topics  Alcohol use: Not on file Allergies Allergen Reactions  Hydrocodone Nausea Only  Oxycodone Nausea Only  Percocet [Oxycodone-Acetaminophen] Nausea Only  Vicodin [Hydrocodone-Acetaminophen] Nausea Only Current Facility-Administered Medications Medication Dose Route Frequency  bumetanide (BUMEX) injection 1 mg  1 mg IntraVENous BID  pantoprazole (PROTONIX) tablet 40 mg  40 mg Oral ACB  sodium chloride (NS) flush 5-40 mL  5-40 mL IntraVENous Q8H  
 sodium chloride (NS) flush 5-40 mL  5-40 mL IntraVENous PRN  
 ondansetron (ZOFRAN) injection 4 mg  4 mg IntraVENous Q4H PRN  
 enoxaparin (LOVENOX) injection 40 mg  40 mg SubCUTAneous Q24H  
 nicotine (NICODERM CQ) 21 mg/24 hr patch 1 Patch  1 Patch TransDERmal DAILY  methylPREDNISolone (PF) (Solu-MEDROL) injection 60 mg  60 mg IntraVENous Q6H  
 senna (SENOKOT) tablet 17.2 mg  2 Tab Oral QHS  metoprolol tartrate (LOPRESSOR) tablet 50 mg  50 mg Oral BID  FLUoxetine (PROzac) capsule 60 mg  60 mg Oral DAILY  docusate sodium (COLACE) capsule 100 mg  100 mg Oral TID PRN  
 aspirin delayed-release tablet 81 mg  81 mg Oral DAILY  atorvastatin (LIPITOR) tablet 40 mg  40 mg Oral QHS  acetaminophen (TYLENOL) tablet 650 mg  650 mg Oral Q6H PRN  
 diazePAM (VALIUM) tablet 2.5 mg  2.5 mg Oral BID  dorzolamide (TRUSOPT) 2 % ophthalmic solution 1 Drop  1 Drop Both Eyes TID  ziprasidone (GEODON) capsule 40 mg  40 mg Oral BID WITH MEALS  doxycycline (VIBRAMYCIN) 100 mg in 0.9% sodium chloride (MBP/ADV) 100 mL  100 mg IntraVENous Q12H  
 brimonidine (ALPHAGAN) 0.2 % ophthalmic solution 1 Drop  1 Drop Both Eyes TID  budesonide (PULMICORT) 500 mcg/2 ml nebulizer suspension  500 mcg Nebulization BID RT  
 arformoteroL (BROVANA) neb solution 15 mcg  15 mcg Nebulization BID RT  
 melatonin tablet 9 mg  9 mg Oral QHS  prazosin (MINIPRESS) capsule 4 mg  4 mg Oral QHS  albuterol-ipratropium (DUO-NEB) 2.5 MG-0.5 MG/3 ML  3 mL Nebulization Q4H RT  
 guaiFENesin ER (MUCINEX) tablet 1,200 mg  1,200 mg Oral BID  sodium chloride (NS) flush 5-10 mL  5-10 mL IntraVENous PRN  
 
 
 
 LAB AND IMAGING FINDINGS:  
 
Lab Results Component Value Date/Time WBC 7.4 03/13/2020 02:06 AM  
 HGB 12.4 03/13/2020 02:06 AM  
 PLATELET 052 38/56/7070 02:06 AM  
 
Lab Results Component Value Date/Time Sodium 134 (L) 03/13/2020 02:06 AM  
 Potassium 4.5 03/13/2020 02:06 AM  
 Chloride 99 03/13/2020 02:06 AM  
 CO2 32 03/13/2020 02:06 AM  
 BUN 10 03/13/2020 02:06 AM  
 Creatinine 0.64 (L) 03/13/2020 02:06 AM  
 Calcium 8.1 (L) 03/13/2020 02:06 AM  
 Magnesium 1.2 (L) 03/11/2020 10:09 PM  
  
Lab Results Component Value Date/Time AST (SGOT) 23 03/12/2020 01:44 AM  
 Alk.  phosphatase 66 03/12/2020 01:44 AM  
 Protein, total 6.5 03/12/2020 01:44 AM  
 Albumin 3.5 03/12/2020 01:44 AM  
 Globulin 3.0 03/12/2020 01:44 AM  
 
No results found for: INR, PTMR, PTP, PT1, PT2, APTT, INREXT, INREXT  
 No results found for: IRON, FE, TIBC, IBCT, PSAT, FERR Lab Results Component Value Date/Time pH 7.31 (L) 03/13/2020 04:50 PM  
 PCO2 67 (H) 03/13/2020 04:50 PM  
 PO2 154 (H) 03/13/2020 04:50 PM  
 
No components found for: [place] No results found for: CPK, CKMB Total time: 50 min Counseling / coordination time, spent as noted above: 35 min 
> 50% counseling / coordination?: yes Prolonged service was provided for  []30 min   []75 min in face to face time in the presence of the patient, spent as noted above. Time Start:  
Time End:  
Note: this can only be billed with 98569 (initial) or 84689 (follow up). If multiple start / stop times, list each separately.

## 2020-03-15 NOTE — PROGRESS NOTES
[facility] Pulmonary, Critical Care, and Sleep Medicine Initial Patient Consult Name: Jorge Tony MRN: [id number] : 1950 Hospital: [address] [facility] Date: 3/14/2020 IMPRESSION:  
· Acute on chronic hypoxemic/hypercapneic respiratory failure · Advanced COPD w/ acute exacerbation · Hyponatremia, resolved · Fever, resolved · Hyperglycemia RECOMMENDATIONS:  
· Continue NIPPV. Repeat ABG this afternoon · Supplemental O2 as needed to keep sats > 88% · Continue scheduled duonebs, pulmicort, brovana, flutter valve · Continue current dose of IV steroids · Follow cultures · Continue doxy · Diurese · Echo · CXR in am 
· Speech eval when off of NIPPV/HFNC 
· On protonix for GERD 
· On lovenox for DVT ppx · Will consult palliative to see · NPO until off of NIPPV/HRNC Patient is critically ill, requiring BiPap for acute hypercapneic respiratory failure. Total CC time: 33 minutes Subjective:  
 
Mr. Sierra Tracy is a 70yo male w/ history of chronic hypoxemic respiratory failure (on 4L at baseline), COPD and anxiety who presented to the ER on 3/11 w/ complaints of shortness of breath x 2 weeks, wheezing, cough productive of brown sputum, pleuritic chest pain and sore throat. Denies sick contacts or recent travel. Does report frequent coughing while eating/drinking. Is mostly homebound but gets to [facility]  for appointments and was last there on Monday. He is followed by pulmonary at the [facility] -- sees Dr. Le Caldera. Smokes 1ppd (down from 3ppd) since age 15. *all cultures NGTD *RVP negative Interval history: Afebrile Remains on NIPPV. Still hypercapneic on ABG No past medical history on file. No past surgical history on file. Prior to Admission medications Medication Sig Start Date End Date Taking? Authorizing Provider  
predniSONE (DELTASONE) 5 mg tablet Take 5 mg by mouth daily.    Yes Provider, Historical  
 albuterol (PROVENTIL HFA, VENTOLIN HFA, PROAIR HFA) 90 mcg/actuation inhaler Take 2 Puffs by inhalation every four (4) hours as needed for Wheezing or Shortness of Breath. Yes Provider, Historical  
aspirin delayed-release 81 mg tablet Take 81 mg by mouth daily. Yes Provider, Historical  
atorvastatin (LIPITOR) 80 mg tablet Take 40 mg by mouth nightly. Yes Provider, Historical  
docusate sodium (COLACE) 100 mg capsule Take 100 mg by mouth three (3) times daily as needed for Constipation. Yes Provider, Historical  
senna (SENOKOT) 8.6 mg tablet Take 2 Tabs by mouth nightly. Yes Provider, Historical  
psyllium (METAMUCIL) powd Take 1 Dose by mouth two (2) times a day. 2 teaspoons in liquid twice daily    Yes Provider, Historical  
diazePAM (VALIUM) 5 mg tablet Take 2.5 mg by mouth daily. Indications: anxious   Yes Provider, Historical  
cholecalciferol (VITAMIN D3) (1000 Units /25 mcg) tablet Take 2,000 Units by mouth daily. Yes Provider, Historical  
denosumab (PROLIA) 60 mg/mL injection 60 mg by SubCUTAneous route See Admin Instructions. One syringe subq every 6 months. Due 3/13    Yes Provider, Historical  
ipratropium (ATROVENT) 0.02 % soln 0.5 mg by Nebulization route every four (4) hours as needed (shortness of breath). Yes Provider, Historical  
guaiFENesin (ORGANIDIN) 200 mg tablet Take 200 mg by mouth every six (6) hours as needed for Congestion. Yes Provider, Historical  
metoprolol tartrate (LOPRESSOR) 50 mg tablet Take 50 mg by mouth two (2) times a day. Patient takes in morning (0900) and afternoon (1600)   Yes Provider, Historical  
albuterol (PROVENTIL VENTOLIN) 2.5 mg /3 mL (0.083 %) nebu 2.5 mg by Nebulization route every six (6) hours as needed for Wheezing. Yes Provider, Historical  
budesonide-formoteroL (SYMBICORT) 160-4.5 mcg/actuation HFAA Take 2 Puffs by inhalation two (2) times a day.    Yes Provider, Historical  
 polyethylene glycol (MIRALAX) 17 gram packet Take 17 g by mouth daily as needed for Constipation. Yes Provider, Historical  
pantoprazole (PROTONIX) 40 mg tablet Take 40 mg by mouth daily. Yes Provider, Historical  
melatonin 5 mg tablet Take 10 mg by mouth nightly. Yes Provider, Historical  
prazosin (MINIPRESS) 2 mg capsule Take 4 mg by mouth nightly. Yes Provider, Historical  
ziprasidone (GEODON) 40 mg capsule Take 40 mg by mouth two (2) times daily (with meals). Yes Provider, Historical  
FLUoxetine (PROzac) 20 mg capsule Take 60 mg by mouth daily. Yes Provider, Historical  
pediatric multivitamins chewable tablet Take 1 Tab by mouth daily. Yes Provider, Historical  
brinzolamide-brimonidine 1-0.2 % drps Administer 1 Drop to both eyes three (3) times daily. Yes Provider, Historical  
diazePAM (VALIUM) 5 mg tablet Take 5 mg by mouth nightly. Indications: anxious   Yes Provider, Historical  
 
Allergies Allergen Reactions  Hydrocodone Nausea Only  Oxycodone Nausea Only  Percocet [Oxycodone-Acetaminophen] Nausea Only  Vicodin [Hydrocodone-Acetaminophen] Nausea Only Social History Tobacco Use  Smoking status: Not on file Substance Use Topics  Alcohol use: Not on file No family history on file. Current Facility-Administered Medications Medication Dose Route Frequency  [START ON 3/15/2020] bumetanide (BUMEX) injection 1 mg  1 mg IntraVENous DAILY  pantoprazole (PROTONIX) tablet 40 mg  40 mg Oral ACB  sodium chloride (NS) flush 5-40 mL  5-40 mL IntraVENous Q8H  
 enoxaparin (LOVENOX) injection 40 mg  40 mg SubCUTAneous Q24H  
 nicotine (NICODERM CQ) 21 mg/24 hr patch 1 Patch  1 Patch TransDERmal DAILY  methylPREDNISolone (PF) (Solu-MEDROL) injection 60 mg  60 mg IntraVENous Q6H  
 senna (SENOKOT) tablet 17.2 mg  2 Tab Oral QHS  FLUoxetine (PROzac) capsule 60 mg  60 mg Oral DAILY  aspirin delayed-release tablet 81 mg  81 mg Oral DAILY  atorvastatin (LIPITOR) tablet 40 mg  40 mg Oral QHS  diazePAM (VALIUM) tablet 2.5 mg  2.5 mg Oral BID  dorzolamide (TRUSOPT) 2 % ophthalmic solution 1 Drop  1 Drop Both Eyes TID  ziprasidone (GEODON) capsule 40 mg  40 mg Oral BID WITH MEALS  doxycycline (VIBRAMYCIN) 100 mg in 0.9% sodium chloride (MBP/ADV) 100 mL  100 mg IntraVENous Q12H  
 brimonidine (ALPHAGAN) 0.2 % ophthalmic solution 1 Drop  1 Drop Both Eyes TID  budesonide (PULMICORT) 500 mcg/2 ml nebulizer suspension  500 mcg Nebulization BID RT  
 arformoteroL (BROVANA) neb solution 15 mcg  15 mcg Nebulization BID RT  
 melatonin tablet 9 mg  9 mg Oral QHS  prazosin (MINIPRESS) capsule 4 mg  4 mg Oral QHS  albuterol-ipratropium (DUO-NEB) 2.5 MG-0.5 MG/3 ML  3 mL Nebulization Q4H RT  
 guaiFENesin ER (MUCINEX) tablet 1,200 mg  1,200 mg Oral BID Review of Systems: A comprehensive review of systems was negative except for that written in the HPI. Objective:  
Vital Signs:   
Visit Vitals /78 (BP 1 Location: Right arm, BP Patient Position: At rest) Pulse 87 Temp 97.9 °F (36.6 °C) Resp 17 Ht 5' 9\" (1.753 m) Wt 66.6 kg (146 lb 14.4 oz) SpO2 97% BMI 21.69 kg/m² O2 Device: Hi flow nasal cannula O2 Flow Rate (L/min): 50 l/min Temp (24hrs), Av.8 °F (36.6 °C), Min:97.5 °F (36.4 °C), Max:98.1 °F (36.7 °C) Intake/Output:  
Last shift:      1901 - 03/15 0700 In: -  
Out: 281 [LOKXF:129] Last 3 shifts:  07 - 1900 In: 2644 [P.O.:2650; I.V.:732] Out: 5250 [YMDM] Intake/Output Summary (Last 24 hours) at 3/14/2020 2033 Last data filed at 3/14/2020 2004 Gross per 24 hour Intake 1320 ml Output 3025 ml Net -1705 ml Physical Exam:  
General:  Sleeping but arousable, on BiPap Head:  NCAT Eyes:  EOMI Nose: Throat:   
Neck: Trachea midline Back:     
Lungs:   Scattered wheeze and rhonchi Chest wall:    
Heart:  RRR  
 Abdomen:   Soft, NT, ND, +bowel sounds Extremities: No edema Pulses:   
Skin:   
Lymph nodes:   
Neurologic: Oriented x 3 Data review:  
 
Recent Results (from the past 24 hour(s)) CBC WITH AUTOMATED DIFF Collection Time: 03/14/20  4:45 AM  
Result Value Ref Range WBC 7.8 4.1 - 11.1 K/uL  
 RBC 3.73 (L) 4.10 - 5.70 M/uL  
 HGB 12.2 12.1 - 17.0 g/dL HCT 37.3 36.6 - 50.3 % .0 (H) 80.0 - 99.0 FL  
 MCH 32.7 26.0 - 34.0 PG  
 MCHC 32.7 30.0 - 36.5 g/dL  
 RDW 11.9 11.5 - 14.5 % PLATELET 353 231 - 854 K/uL MPV 9.1 8.9 - 12.9 FL  
 NRBC 0.0 0  WBC ABSOLUTE NRBC 0.00 0.00 - 0.01 K/uL NEUTROPHILS 87 (H) 32 - 75 % LYMPHOCYTES 4 (L) 12 - 49 % MONOCYTES 8 5 - 13 % EOSINOPHILS 0 0 - 7 % BASOPHILS 0 0 - 1 % IMMATURE GRANULOCYTES 0 0.0 - 0.5 % ABS. NEUTROPHILS 6.8 1.8 - 8.0 K/UL  
 ABS. LYMPHOCYTES 0.3 (L) 0.8 - 3.5 K/UL  
 ABS. MONOCYTES 0.6 0.0 - 1.0 K/UL  
 ABS. EOSINOPHILS 0.0 0.0 - 0.4 K/UL  
 ABS. BASOPHILS 0.0 0.0 - 0.1 K/UL  
 ABS. IMM. GRANS. 0.0 0.00 - 0.04 K/UL  
 DF AUTOMATED METABOLIC PANEL, COMPREHENSIVE Collection Time: 03/14/20  4:45 AM  
Result Value Ref Range Sodium 135 (L) 136 - 145 mmol/L Potassium 4.3 3.5 - 5.1 mmol/L Chloride 94 (L) 97 - 108 mmol/L  
 CO2 39 (H) 21 - 32 mmol/L Anion gap 2 (L) 5 - 15 mmol/L Glucose 140 (H) 65 - 100 mg/dL BUN 16 6 - 20 MG/DL Creatinine 0.71 0.70 - 1.30 MG/DL  
 BUN/Creatinine ratio 23 (H) 12 - 20 GFR est AA >60 >60 ml/min/1.73m2 GFR est non-AA >60 >60 ml/min/1.73m2 Calcium 8.2 (L) 8.5 - 10.1 MG/DL Bilirubin, total 0.2 0.2 - 1.0 MG/DL  
 ALT (SGPT) 31 12 - 78 U/L  
 AST (SGOT) 33 15 - 37 U/L Alk. phosphatase 57 45 - 117 U/L Protein, total 5.9 (L) 6.4 - 8.2 g/dL Albumin 3.0 (L) 3.5 - 5.0 g/dL Globulin 2.9 2.0 - 4.0 g/dL A-G Ratio 1.0 (L) 1.1 - 2.2 MAGNESIUM Collection Time: 03/14/20  4:45 AM  
Result Value Ref Range Magnesium 1.8 1.6 - 2.4 mg/dL PHOSPHORUS Collection Time: 03/14/20  4:45 AM  
Result Value Ref Range Phosphorus 2.4 (L) 2.6 - 4.7 MG/DL  
BLOOD GAS, ARTERIAL Collection Time: 03/14/20  7:23 AM  
Result Value Ref Range pH 7.34 (L) 7.35 - 7.45    
 PCO2 73 (H) 35.0 - 45.0 mmHg PO2 95 80 - 100 mmHg O2 SAT 96 92 - 97 % BICARBONATE 39 (H) 22 - 26 mmol/L  
 BASE EXCESS 10.3 mmol/L  
 O2 METHOD BIPAP    
 FIO2 40 % IPAP/PIP 24 EPAP/CPAP/PEEP 5 Sample source ARTERIAL    
 SITE RIGHT RADIAL DOROTHY'S TEST YES    
BLOOD GAS, ARTERIAL Collection Time: 03/14/20  4:31 PM  
Result Value Ref Range pH 7.40 7.35 - 7.45    
 PCO2 64 (H) 35.0 - 45.0 mmHg PO2 104 (H) 80 - 100 mmHg O2 SAT 97 92 - 97 % BICARBONATE 38 (H) 22 - 26 mmol/L  
 BASE EXCESS 10.6 mmol/L  
 O2 METHOD BIPAP    
 FIO2 40 % IPAP/PIP 24 EPAP/CPAP/PEEP 5 Sample source ARTERIAL    
 SITE RIGHT RADIAL DOROTHY'S TEST YES Imaging: 
I have personally reviewed the patients radiographs and have reviewed the reports: 
  
 
  
Eliana Crespo MD

## 2020-03-15 NOTE — CONSULTS
KIRIT Tristan is a 81 y.o. male with COPD, hypertension, dyslipidemia reporting 2 weeks of SOB, wheezing and coughing found to have acute on chronic respiratory failure and AF with RVR. He converted to SR. He was started on a diltiazem drip. Echocardiogram demonstrated preserved LV function. He is on bumex 1 mg IV daily and has a net I/O of -1900. Feeling somewhat improved from presentation but not yet to baseline. ACTIVE PROBLEM LIST Patient Active Problem List  
 Diagnosis Date Noted  Acute on chronic respiratory failure with hypoxia ([facility] [address] 03/11/2020 MEDICATIONS Current Facility-Administered Medications Medication Dose Route Frequency  dilTIAZem (CARDIZEM) 100 mg in 0.9% sodium chloride (MBP/ADV) 100 mL infusion  5 mg/hr IntraVENous CONTINUOUS  
 bumetanide (BUMEX) injection 1 mg  1 mg IntraVENous DAILY  pantoprazole (PROTONIX) tablet 40 mg  40 mg Oral ACB  sodium chloride (NS) flush 5-40 mL  5-40 mL IntraVENous Q8H  
 sodium chloride (NS) flush 5-40 mL  5-40 mL IntraVENous PRN  
 ondansetron (ZOFRAN) injection 4 mg  4 mg IntraVENous Q4H PRN  
 enoxaparin (LOVENOX) injection 40 mg  40 mg SubCUTAneous Q24H  
 nicotine (NICODERM CQ) 21 mg/24 hr patch 1 Patch  1 Patch TransDERmal DAILY  methylPREDNISolone (PF) (Solu-MEDROL) injection 60 mg  60 mg IntraVENous Q6H  
 senna (SENOKOT) tablet 17.2 mg  2 Tab Oral QHS  FLUoxetine (PROzac) capsule 60 mg  60 mg Oral DAILY  docusate sodium (COLACE) capsule 100 mg  100 mg Oral TID PRN  
 aspirin delayed-release tablet 81 mg  81 mg Oral DAILY  atorvastatin (LIPITOR) tablet 40 mg  40 mg Oral QHS  acetaminophen (TYLENOL) tablet 650 mg  650 mg Oral Q6H PRN  
 diazePAM (VALIUM) tablet 2.5 mg  2.5 mg Oral BID  dorzolamide (TRUSOPT) 2 % ophthalmic solution 1 Drop  1 Drop Both Eyes TID  ziprasidone (GEODON) capsule 40 mg  40 mg Oral BID WITH MEALS  
  doxycycline (VIBRAMYCIN) 100 mg in 0.9% sodium chloride (MBP/ADV) 100 mL  100 mg IntraVENous Q12H  
 brimonidine (ALPHAGAN) 0.2 % ophthalmic solution 1 Drop  1 Drop Both Eyes TID  budesonide (PULMICORT) 500 mcg/2 ml nebulizer suspension  500 mcg Nebulization BID RT  
 arformoteroL (BROVANA) neb solution 15 mcg  15 mcg Nebulization BID RT  
 melatonin tablet 9 mg  9 mg Oral QHS  prazosin (MINIPRESS) capsule 4 mg  4 mg Oral QHS  albuterol-ipratropium (DUO-NEB) 2.5 MG-0.5 MG/3 ML  3 mL Nebulization Q4H RT  
 guaiFENesin ER (MUCINEX) tablet 1,200 mg  1,200 mg Oral BID  sodium chloride (NS) flush 5-10 mL  5-10 mL IntraVENous PRN  
  
 
 
PAST MEDICAL HISTORY No past medical history on file. PAST SURGICAL HISTORY No past surgical history on file. ALLERGIES Allergies Allergen Reactions  Hydrocodone Nausea Only  Oxycodone Nausea Only  Percocet [Oxycodone-Acetaminophen] Nausea Only  Vicodin [Hydrocodone-Acetaminophen] Nausea Only FAMILY HISTORY No family history on file. negative for cardiac disease SOCIAL HISTORY Social History Socioeconomic History  Marital status:  Spouse name: Not on file  Number of children: Not on file  Years of education: Not on file  Highest education level: Not on file MEDICATIONS Current Facility-Administered Medications Medication Dose Route Frequency  dilTIAZem (CARDIZEM) 100 mg in 0.9% sodium chloride (MBP/ADV) 100 mL infusion  5 mg/hr IntraVENous CONTINUOUS  
 bumetanide (BUMEX) injection 1 mg  1 mg IntraVENous DAILY  pantoprazole (PROTONIX) tablet 40 mg  40 mg Oral ACB  sodium chloride (NS) flush 5-40 mL  5-40 mL IntraVENous Q8H  
 sodium chloride (NS) flush 5-40 mL  5-40 mL IntraVENous PRN  
 ondansetron (ZOFRAN) injection 4 mg  4 mg IntraVENous Q4H PRN  
 enoxaparin (LOVENOX) injection 40 mg  40 mg SubCUTAneous Q24H  nicotine (NICODERM CQ) 21 mg/24 hr patch 1 Patch  1 Patch TransDERmal DAILY  methylPREDNISolone (PF) (Solu-MEDROL) injection 60 mg  60 mg IntraVENous Q6H  
 senna (SENOKOT) tablet 17.2 mg  2 Tab Oral QHS  FLUoxetine (PROzac) capsule 60 mg  60 mg Oral DAILY  docusate sodium (COLACE) capsule 100 mg  100 mg Oral TID PRN  
 aspirin delayed-release tablet 81 mg  81 mg Oral DAILY  atorvastatin (LIPITOR) tablet 40 mg  40 mg Oral QHS  acetaminophen (TYLENOL) tablet 650 mg  650 mg Oral Q6H PRN  
 diazePAM (VALIUM) tablet 2.5 mg  2.5 mg Oral BID  dorzolamide (TRUSOPT) 2 % ophthalmic solution 1 Drop  1 Drop Both Eyes TID  ziprasidone (GEODON) capsule 40 mg  40 mg Oral BID WITH MEALS  doxycycline (VIBRAMYCIN) 100 mg in 0.9% sodium chloride (MBP/ADV) 100 mL  100 mg IntraVENous Q12H  
 brimonidine (ALPHAGAN) 0.2 % ophthalmic solution 1 Drop  1 Drop Both Eyes TID  budesonide (PULMICORT) 500 mcg/2 ml nebulizer suspension  500 mcg Nebulization BID RT  
 arformoteroL (BROVANA) neb solution 15 mcg  15 mcg Nebulization BID RT  
 melatonin tablet 9 mg  9 mg Oral QHS  prazosin (MINIPRESS) capsule 4 mg  4 mg Oral QHS  albuterol-ipratropium (DUO-NEB) 2.5 MG-0.5 MG/3 ML  3 mL Nebulization Q4H RT  
 guaiFENesin ER (MUCINEX) tablet 1,200 mg  1,200 mg Oral BID  sodium chloride (NS) flush 5-10 mL  5-10 mL IntraVENous PRN I have reviewed the nurses notes, vitals, problem list, allergy list, medical history, family, social history and medications. REVIEW OF SYMPTOMS General: Pt denies excessive weight gain or loss. Pt is able to conduct ADL's HEENT: Denies blurred vision, headaches, hearing loss, epistaxis and difficulty swallowing. Respiratory: Denies cough, congestion, shortness of breath, ARTEAGA, wheezing or stridor. Cardiovascular: Denies precordial pain, palpitations, edema or PND Gastrointestinal: Denies poor appetite, indigestion, abdominal pain or blood in stool Genitourinary: Denies hematuria, dysuria, increased urinary frequency Musculoskeletal: Denies joint pain or swelling from muscles or joints Neurologic: Denies tremor, paresthesias, headache, or sensory motor disturbance Psychiatric: Denies confusion, insomnia, depression Integumentray: Denies rash, itching or ulcers. Hematologic: Denies easy bruising, bleeding PHYSICAL EXAMINATION Vitals:  
 03/15/20 1119 03/15/20 1150 03/15/20 1402 03/15/20 1524 BP:  120/54 Pulse:  65 87 Resp:  17 Temp:  98 °F (36.7 °C) SpO2: 94% 100%  100% Weight:      
Height:      
 
General: Well developed, in no acute distress. HEENT: No jaundice, oral mucosa moist, no oral ulcers Neck: Supple, no stiffness, no lymphadenopathy, supple Heart:  Normal S1/S2 negative S3 or S4. Regular, no murmur, gallop or rub, no jugular venous distention Respiratory: Clear bilaterally x 4, no wheezing or rales Abdomen:   Soft, non-tender, bowel sounds are active.  
Extremities:  No edema, normal cap refill, no cyanosis. Musculoskeletal: No clubbing, no deformities Neuro: A&Ox3, speech clear, gait stable, cooperative, no focal neurologic deficits Skin: Skin color is normal. No rashes or lesions. Non diaphoretic, moist. 
Vascular: 2+ pulses symmetric in all extremities DIAGNOSTIC DATA TELEMETRY: Atrial fibrillation with rates up to the 160's until this AM; now in sinus rhythm with occ NSAT 
 
 
 LABORATORY DATA Lab Results Component Value Date/Time WBC 6.7 03/15/2020 06:22 AM  
 HGB 13.6 03/15/2020 06:22 AM  
 HCT 41.7 03/15/2020 06:22 AM  
 PLATELET 709 01/76/1449 06:22 AM  
 .5 (H) 03/15/2020 06:22 AM  
  
Lab Results Component Value Date/Time  Sodium 132 (L) 03/15/2020 06:22 AM  
 Potassium 4.1 03/15/2020 06:22 AM  
 Chloride 90 (L) 03/15/2020 06:22 AM  
 CO2 40 (H) 03/15/2020 06:22 AM  
 Anion gap 2 (L) 03/15/2020 06:22 AM  
 Glucose 136 (H) 03/15/2020 06:22 AM  
 BUN 18 03/15/2020 06:22 AM  
 Creatinine 0.84 03/15/2020 06:22 AM  
 BUN/Creatinine ratio 21 (H) 03/15/2020 06:22 AM  
 GFR est AA >60 03/15/2020 06:22 AM  
 GFR est non-AA >60 03/15/2020 06:22 AM  
 Calcium 8.6 03/15/2020 06:22 AM  
 Bilirubin, total 0.2 03/14/2020 04:45 AM  
 AST (SGOT) 33 03/14/2020 04:45 AM  
 Alk. phosphatase 57 03/14/2020 04:45 AM  
 Protein, total 5.9 (L) 03/14/2020 04:45 AM  
 Albumin 3.5 03/15/2020 06:22 AM  
 Globulin 2.9 03/14/2020 04:45 AM  
 A-G Ratio 1.0 (L) 03/14/2020 04:45 AM  
 ALT (SGPT) 31 03/14/2020 04:45 AM  
  
 
 
 ASSESSMENT 1. Atrial fibrillation A. Paroxysmal 
2. Acute on chronic hypercapneic/hypoxemic respiratory failure 3. COPD 4. Hyponatremia 5. CAD, native 6. Percutaneous transluminal angioplasty 7. Tobacco abuse PLAN Continue IV diuretics with aim to transition to PO soon; start oral diltiazem 120 mg daily and wean off diltiazem gtt. Recommend anticoagulation with eliquis 5  Mg bid. Ernesto Ford MD 
Cardiac Electrophysiology / Cardiology [address], [address] [facility], [address] [place] 
[phone] / [phone] Fax   [phone] / [phone] Fax

## 2020-03-15 NOTE — PROGRESS NOTES
Bedside shift change report given to DIANE (oncoming nurse) by April (offgoing nurse). Report included the following information SBAR.

## 2020-03-15 NOTE — PROGRESS NOTES
Problem: Falls - Risk of 
Goal: *Absence of Falls Description: Document Charleen Quezada Fall Risk and appropriate interventions in the flowsheet. 3/14/2020 2334 by Sylvia Cloud RN Outcome: Progressing Towards Goal 
Note: Fall Risk Interventions: 
Mobility Interventions: Bed/chair exit alarm, Communicate number of staff needed for ambulation/transfer, Assess mobility with egress test, Patient to call before getting OOB Medication Interventions: Teach patient to arise slowly, Patient to call before getting OOB, Bed/chair exit alarm Elimination Interventions: Bed/chair exit alarm, Call light in reach, Patient to call for help with toileting needs, Stay With Me (per policy) History of Falls Interventions: Bed/chair exit alarm, Room close to nurse's station 3/14/2020 2333 by Sylvia Cloud RN Outcome: Progressing Towards Goal 
Note: Fall Risk Interventions: 
Mobility Interventions: Bed/chair exit alarm, Communicate number of staff needed for ambulation/transfer, Assess mobility with egress test, Patient to call before getting OOB Medication Interventions: Teach patient to arise slowly, Patient to call before getting OOB, Bed/chair exit alarm Elimination Interventions: Bed/chair exit alarm, Call light in reach, Patient to call for help with toileting needs, Stay With Me (per policy) History of Falls Interventions: Bed/chair exit alarm, Room close to nurse's station Problem: Patient Education: Go to Patient Education Activity Goal: Patient/Family Education 3/14/2020 2334 by Sylvia Cloud RN Outcome: Progressing Towards Goal 
3/14/2020 2333 by Syvlia Cloud RN Outcome: Progressing Towards Goal 
  
Problem: Risk for Spread of Infection Goal: Prevent transmission of infectious organism to others Description: Prevent the transmission of infectious organisms to other patients, staff members, and visitors.  
3/14/2020 2334 by Sylvia Cloud RN 
 Outcome: Progressing Towards Goal 
3/14/2020 2333 by Cindy Tesfaye RN Outcome: Progressing Towards Goal 
  
Problem: Patient Education:  Go to Education Activity Goal: Patient/Family Education 3/14/2020 2334 by Cindy Tesfaye RN Outcome: Progressing Towards Goal 
3/14/2020 2333 by Cindy Tesfaye RN Outcome: Progressing Towards Goal 
  
Problem: Pressure Injury - Risk of 
Goal: *Prevention of pressure injury Description: Document Andre Scale and appropriate interventions in the flowsheet. 3/14/2020 2334 by Cindy Tesfaye RN Outcome: Progressing Towards Goal 
Note: Pressure Injury Interventions: Activity Interventions: Increase time out of bed, PT/OT evaluation Mobility Interventions: Turn and reposition approx. every two hours(pillow and wedges) Nutrition Interventions: Document food/fluid/supplement intake Friction and Shear Interventions: Apply protective barrier, creams and emollients 3/14/2020 2333 by Cindy Tesfaye RN Outcome: Progressing Towards Goal 
Note: Pressure Injury Interventions: Activity Interventions: Increase time out of bed, PT/OT evaluation Mobility Interventions: Turn and reposition approx. every two hours(pillow and wedges) Nutrition Interventions: Document food/fluid/supplement intake Friction and Shear Interventions: Apply protective barrier, creams and emollients Problem: Patient Education: Go to Patient Education Activity Goal: Patient/Family Education 3/14/2020 2334 by Cindy Tesfaye RN Outcome: Progressing Towards Goal 
3/14/2020 2333 by Cindy Tesfaye RN Outcome: Progressing Towards Goal 
  
Problem: Chronic Obstructive Pulmonary Disease (COPD) Goal: *Oxygen saturation during activity within specified parameters Outcome: Progressing Towards Goal 
Goal: *Able to remain out of bed as prescribed Outcome: Progressing Towards Goal 
Goal: *Absence of hypoxia Outcome: Progressing Towards Goal 
 Goal: *Optimize nutritional status Outcome: Progressing Towards Goal 
  
Problem: Patient Education: Go to Patient Education Activity Goal: Patient/Family Education Outcome: Progressing Towards Goal

## 2020-03-15 NOTE — PROGRESS NOTES
Bedside shift change report given to Christiano Wright (oncoming nurse) by April (offgoing nurse). Report included the following information SBAR.

## 2020-03-15 NOTE — PROGRESS NOTES
SHIFT CHANGE: 
1930 Bedside and Verbal shift change report given to Tisha ROCHA (oncoming nurse) by April (offgoing nurse). Report included the following information SBAR, Kardex, MAR and Recent Results. SHIFT SUMMARY: 
1900  Dr. Poppy Chao bedside, decreased fio2 as patient's oxygen sats 100%. Will continue to wean as tolerated per MD. 
8051  Notified by tele that patient's HR up to 130-140's. Patient found sitting up in bed, anxious. Stated he was nauseated, given zofran. 0100  Patient placed on bipap for sleep, much more relaxed at this time. Patient's HR up to 170 when he got up to [facility], resolved when back in bed and breathing recovered. Oxygen saturations @ 77 when up. END OF SHIFT REPORT: 
0730  Bedside and Verbal shift change report given to [facility] (oncoming nurse) by Nathaly Albarran (offgoing nurse). Report included the following information SBAR, Kardex, MAR and Recent Results.

## 2020-03-15 NOTE — PROGRESS NOTES
Luis Espinosa gabriella [address] 81 
[address] [facility], [address] [place] [address] 
[phone] Medical Progress Note NAME: Virgen Louis :  1950 MRM:  [id number] Date/Time: 3/15/2020 Assessment / Plan:  
 
Acute on chronic respiratory failure with hypoxia: 2/2 COPD exacerbation. No PE on CTA chest. No PNA on imaging. Improving very slowly. Pulmonology following, discussed with Dr. Tylor Fong. Continue IV diuretics A-fib: new onset, converted to sinus after IV diltiazem. Transition to PO. Start Eliquis per cardiology COPD exacerbation: unclear precipitating factor, ?active smoker. No evidence of pneumonia on chest imaging. Flu and RVP negative. Continue doxy, duo-nebs, steroids, LABA/ICS.   
 
SIRS / Elevated lactic acid: resolved. No source of infection thus far other than possible viral. Monitor clinically. No fever since admission 
  
Hyponatremia: mild, borderline. Possible SIADH due to lung disease vs SSRI. Improved. Continue Prozac for now 
  
Anxiety: severe and contributing to dyspnea. Better/stable. Continue Prozac, Valium, Geodon 
  
HTN : HOLD metoprolol due to borderline bradycardia and hypotension HLD: continue statin 
  
 
Total time spent: 35 minutes, d/w Dr. Tylor Fong Time spent in the care of this patient including reviewing records, discussing with nursing and/or other providers on the treatment team, obtaining history and examining the patient, and discussing treatment plans. Care Plan discussed with: Patient, Nursing Staff and >50% of time spent in counseling and coordination of care Discussed:  Care Plan and D/C Planning Prophylaxis:  Lovenox Disposition:  Home w/Family Subjective: Chief Complaint:  Follow up COPD Chart/notes/labs/studies reviewed, patient examined at bedside. Feels okay. Less SOB. No CP. No F/C Objective:  
 
 
Vitals: Last 24hrs VS reviewed since prior progress note. Most recent are: 
 
Visit Vitals /72 (BP 1 Location: Right arm, BP Patient Position: At rest) Pulse (!) 107 Temp 97.9 °F (36.6 °C) Resp 28 Ht 5' 9\" (1.753 m) Wt 63.7 kg (140 lb 6.9 oz) SpO2 97% BMI 20.74 kg/m² SpO2 Readings from Last 6 Encounters:  
03/15/20 97% O2 Flow Rate (L/min): 50 l/min Intake/Output Summary (Last 24 hours) at 3/15/2020 1908 Last data filed at 3/15/2020 1856 Gross per 24 hour Intake 2349.33 ml Output 3025 ml Net -675.67 ml Exam:  
 
Physical Exam: 
 
Gen: disheveled, chronically ill-appearing HEENT:  Sclerae nonicteric, hearing intact to voice, mucous membranes moist 
Neck:  Supple, without masses. Resp:  No accessory muscle use with increased WOB. Diminished with mild wheezing. No rales or rhonchi 
Card: RRR, without m/r/g. No LE edema. Abd:  +bowel sounds, soft, NTTP, nondistended. No HSM. Neuro: Face symmetric, tongue midline, speech fluent, follows commands appropriately Psych:  Alert, oriented x 3. Fair insight Medications Reviewed: (see below) Lab Data Reviewed: (see below) 
 
______________________________________________________________________ Medications:  
 
Current Facility-Administered Medications Medication Dose Route Frequency  dilTIAZem (CARDIZEM) 100 mg in 0.9% sodium chloride (MBP/ADV) 100 mL infusion  5 mg/hr IntraVENous CONTINUOUS  
 dilTIAZem CD (CARDIZEM CD) capsule 120 mg  120 mg Oral DAILY  bumetanide (BUMEX) injection 1 mg  1 mg IntraVENous DAILY  pantoprazole (PROTONIX) tablet 40 mg  40 mg Oral ACB  sodium chloride (NS) flush 5-40 mL  5-40 mL IntraVENous Q8H  
 sodium chloride (NS) flush 5-40 mL  5-40 mL IntraVENous PRN  
 ondansetron (ZOFRAN) injection 4 mg  4 mg IntraVENous Q4H PRN  
 enoxaparin (LOVENOX) injection 40 mg  40 mg SubCUTAneous Q24H  
 nicotine (NICODERM CQ) 21 mg/24 hr patch 1 Patch  1 Patch TransDERmal DAILY  methylPREDNISolone (PF) (Solu-MEDROL) injection 60 mg  60 mg IntraVENous Q6H  
 senna (SENOKOT) tablet 17.2 mg  2 Tab Oral QHS  FLUoxetine (PROzac) capsule 60 mg  60 mg Oral DAILY  docusate sodium (COLACE) capsule 100 mg  100 mg Oral TID PRN  
 aspirin delayed-release tablet 81 mg  81 mg Oral DAILY  atorvastatin (LIPITOR) tablet 40 mg  40 mg Oral QHS  acetaminophen (TYLENOL) tablet 650 mg  650 mg Oral Q6H PRN  
 diazePAM (VALIUM) tablet 2.5 mg  2.5 mg Oral BID  dorzolamide (TRUSOPT) 2 % ophthalmic solution 1 Drop  1 Drop Both Eyes TID  ziprasidone (GEODON) capsule 40 mg  40 mg Oral BID WITH MEALS  doxycycline (VIBRAMYCIN) 100 mg in 0.9% sodium chloride (MBP/ADV) 100 mL  100 mg IntraVENous Q12H  
 brimonidine (ALPHAGAN) 0.2 % ophthalmic solution 1 Drop  1 Drop Both Eyes TID  budesonide (PULMICORT) 500 mcg/2 ml nebulizer suspension  500 mcg Nebulization BID RT  
 arformoteroL (BROVANA) neb solution 15 mcg  15 mcg Nebulization BID RT  
 melatonin tablet 9 mg  9 mg Oral QHS  prazosin (MINIPRESS) capsule 4 mg  4 mg Oral QHS  albuterol-ipratropium (DUO-NEB) 2.5 MG-0.5 MG/3 ML  3 mL Nebulization Q4H RT  
 guaiFENesin ER (MUCINEX) tablet 1,200 mg  1,200 mg Oral BID  sodium chloride (NS) flush 5-10 mL  5-10 mL IntraVENous PRN Lab Review:  
 
Recent Labs  
  03/15/20 
0622 03/14/20 
0445 03/13/20 
0206 WBC 6.7 7.8 7.4 HGB 13.6 12.2 12.4 HCT 41.7 37.3 37.3  188 183 Recent Labs  
  03/15/20 
0622 03/14/20 
0445 03/13/20 
0206 * 135* 134* K 4.1 4.3 4.5  
CL 90* 94* 99  
CO2 40* 39* 32 * 140* 165* BUN 18 16 10 CREA 0.84 0.71 0.64* CA 8.6 8.2* 8.1*  
MG 1.9 1.8  --   
PHOS 3.7 2.4*  --   
ALB 3.5 3.0*  --   
SGOT  --  33  --   
ALT  --  31  -- No components found for: [place] Recent Labs  
  03/14/20 
1631 03/14/20 
0723 03/13/20 
1650 PH 7.40 7.34* 7.31* PCO2 64* 73* 67* PO2 104* 95 154* HCO3 38* 39* 33* FIO2 40 40 50 No results for input(s): INR, INREXT, INREXT in the last 72 hours. No results found for: SDES Lab Results Component Value Date/Time Culture result: NO GROWTH 4 DAYS 03/11/2020 10:24 PM  
 Culture result: NO GROWTH 4 DAYS 03/11/2020 10:09 PM  
 
        
___________________________________________________ Attending Physician: Argelia Upton MD

## 2020-03-15 NOTE — ROUTINE PROCESS
7106 
Patient desated into the 60's due to tubing coming loose from bipap. Recovered quickly once reapplied. 3480 Patient appears to be in A Fib RVR. Heart rate as high as 130's. Denies discomfort. 5409 EKG obtained. Notified Dr. Deepa Roper of findings, vitals, and some history. New orders obtained. 1005 Notified Dr. Deepa Roper of patient heart rate going into the 160's and back down to the 120's. New orders received. Advised tele to notify nurse if heart rate sustains in the 90's or SBP less than 90. 
 
4241 Clarification of cardizem order. Does NOT want to titrate. 1364 Patient appears to have converted to NSR. Notified Dr. Deepa Roper.  Advised to drop rate to 5 mg/hr for 30 minutes then stop. 
 
4780 Bedside and Verbal shift change report given to April (oncoming nurse) by Stacey Banks (offgoing nurse). Report included the following information SBAR, Kardex, ED Summary, Procedure Summary, Intake/Output, MAR, Recent Results and Cardiac Rhythm NSR.

## 2020-03-16 NOTE — PROGRESS NOTES
SPEECH PATHOLOGY BEDSIDE SWALLOW EVALUATION/DISCHARGE Patient: Elza Plaza (67 y.o. male) Date: 3/16/2020 Primary Diagnosis: Acute on chronic respiratory failure with hypoxia ([facility] [J96.21] Precautions:     
 
ASSESSMENT : 
Based on the objective data described below, the patient presents with functional swallow. He has aspiration risks due to high flow, respiratory status, but appeared competent to determine when it was safe to eat or not. . 
Chest CT: emphsema. Admitted 3-11-20 with COPD exacerbation, on NIPPPV vs bipap. Now HFNC. PMH: COPD, +tobacco, HTN, XOL, GERD. Skilled acute therapy provided by a speech-language pathologist is not indicated at this time. PLAN : 
Recommendations: 
Diet as tolerated. Eat only when breathing stable. Discharge Recommendations: None SUBJECTIVE:  
Patient stated I choked when I swished the water. OBJECTIVE:  
No past medical history on file. No past surgical history on file. Prior Level of Function/Home Situation:  
Home Situation Home Environment: Private residence One/Two Story Residence: Two story, live on 1st floor Living Alone: No 
Support Systems: Spouse/Significant Other/Partner, Friends \ neighbors, Family member(s), Child(haily) Patient Expects to be Discharged to[de-identified] Private residence Current DME Used/Available at Home: Oxygen, portable, Nebulizer, Cane, straight, Walker Diet prior to admission: regular, thins Current Diet:  Regular, thins Cognitive and Communication Status: 
Neurologic State: Alert Orientation Level: Oriented X4 Cognition: Appropriate decision making Perseveration: No perseveration noted Safety/Judgement: Insight into deficits Oral Assessment: 
Oral Assessment Labial: No impairment Dentition: Upper & lower dentures(he c/o gum soreness) Lingual: No impairment Mandible: No impairment P.O. Trials: 
Patient Position: upright in bed Vocal quality prior to P.O.: No impairment Consistency Presented: Solid; Thin liquid How Presented: Self-fed/presented;Spoon; Successive swallows;Straw ORAL PHASE:  
Bolus Acceptance: No impairment Bolus Formation/Control: No impairment Propulsion: No impairment Oral Residue: None Patient c/o gum soreness. Helped him obtain material to complete oral care. He bit his lip several times when eating. PHARYNGEAL PHASE:  
Initiation of Swallow: No impairment Laryngeal Elevation: Functional 
Aspiration Signs/Symptoms: None Pharyngeal Phase Characteristics: (patient c/o choking on water only when he gargled to clear oral cavity) 
  
 patient reports that sometimes he gets spasms in his neck that make him choke since he started prednisone. He admits to h/o GERD Patient is on high flow NC.   
  
  
NOMS:  
The NOMS functional outcome measure was used to quantify this patient's level of swallowing impairment. Based on the NOMS, the patient was determined to be at level 6 for swallow function NOMS Swallowing Levels: 
Level 1 (CN): NPO Level 2 (CM): NPO but takes consistency in therapy Level 3 (CL): Takes less than 50% of nutrition p.o. and continues with nonoral feedings; and/or safe with mod cues; and/or max diet restriction Level 4 (CK): Safe swallow but needs mod cues; and/or mod diet restriction; and/or still requires some nonoral feeding/supplements Level 5 (CJ): Safe swallow with min diet restriction; and/or needs min cues Level 6 (CI): Independent with p.o.; rare cues; usually self cues; may need to avoid some foods or needs extra time Level 7 (CH): Independent for all p.o. ADRIAN. (2003). National Outcomes Measurement System (NOMS): Adult Speech-Language Pathology User's Guide. Pain: 
Pain Scale 1: Numeric (0 - 10) Pain Intensity 1: 7 Pain Location 1: Neck After treatment:  
Patient left in no apparent distress in bed COMMUNICATION/EDUCATION:  
Patient was educated regarding his deficit(s) of potential dypshagia  as this relates to his diagnosis of acute on chronic respiratory failure. He demonstrated Good understanding as evidenced by discussion. Wilton Cole The patient's plan of care including recommendations, planned interventions, and recommended diet changes were discussed with: Registered nurse. Thank you for this referral. 
REED Olson Time Calculation: 15 mins

## 2020-03-16 NOTE — PROGRESS NOTES
[facility], [facility] Sleep Medicine Initial Patient Consult Name: Robi Chilel MRN: [id number] : 1950 Hospital: [address] [place] [address] Date: 3/16/2020 IMPRESSION:  
· Acute on chronic hypoxemic/hypercapneic respiratory failure · Advanced COPD w/ acute exacerbation · Hyponatremia, · Fever, resolved · Hyperglycemia RECOMMENDATIONS:  
· Supplemental O2 as needed to keep sats > 88% · NIPPV whenever sleeping and prn. Would benefit from trilogy on discharge · Continue scheduled duonebs, pulmicort, brovana, flutter valve · Continue current dose of IV steroids · Follow cultures · Continue doxy (day 5 of 5) · Continue bumex · CXR in am 
· Speech eval when off of NIPPV/HFNC 
· On protonix for GERD 
· On lovenox for DVT ppx · Will consult palliative to see Patient is critically ill, requiring BiPap/HFNC for acute hypercapneic respiratory failure. Pt remains tachypneic at rest and I spoke with him at length about intubation etc and his understanding is very poor-wants us to try once Subjective:  
 
Mr. Wanda Kaplan is a 70yo male w/ history of chronic hypoxemic respiratory failure (on 4L at baseline), COPD and anxiety who presented to the ER on 3/11 w/ complaints of shortness of breath x 2 weeks, wheezing, cough productive of brown sputum, pleuritic chest pain and sore throat. Denies sick contacts or recent travel. Does report frequent coughing while eating/drinking. Is mostly homebound but gets to the [place]  for appointments and was last there on Monday. He is followed by pulmonary at the [place] -- sees Dr. Sherron Lin. Smokes 1ppd (down from 3ppd) since age 15. 
 
3/11 - CTA chest: emphysematous changes. No asdz. Negative for PE *all cultures NGTD *RVP negative Interval history: On HFNC - 50L/90%, sats 100% Reports severe shortness of breath on exertion No past medical history on file. No past surgical history on file. Prior to Admission medications Medication Sig Start Date End Date Taking? Authorizing Provider  
predniSONE (DELTASONE) 5 mg tablet Take 5 mg by mouth daily. Yes Provider, Historical  
albuterol (PROVENTIL HFA, VENTOLIN HFA, PROAIR HFA) 90 mcg/actuation inhaler Take 2 Puffs by inhalation every four (4) hours as needed for Wheezing or Shortness of Breath. Yes Provider, Historical  
aspirin delayed-release 81 mg tablet Take 81 mg by mouth daily. Yes Provider, Historical  
atorvastatin (LIPITOR) 80 mg tablet Take 40 mg by mouth nightly. Yes Provider, Historical  
docusate sodium (COLACE) 100 mg capsule Take 100 mg by mouth three (3) times daily as needed for Constipation. Yes Provider, Historical  
senna (SENOKOT) 8.6 mg tablet Take 2 Tabs by mouth nightly. Yes Provider, Historical  
psyllium (METAMUCIL) powd Take 1 Dose by mouth two (2) times a day. 2 teaspoons in liquid twice daily    Yes Provider, Historical  
diazePAM (VALIUM) 5 mg tablet Take 2.5 mg by mouth daily. Indications: anxious   Yes Provider, Historical  
cholecalciferol (VITAMIN D3) (1000 Units /25 mcg) tablet Take 2,000 Units by mouth daily. Yes Provider, Historical  
denosumab (PROLIA) 60 mg/mL injection 60 mg by SubCUTAneous route See Admin Instructions. One syringe subq every 6 months. Due 3/13    Yes Provider, Historical  
ipratropium (ATROVENT) 0.02 % soln 0.5 mg by Nebulization route every four (4) hours as needed (shortness of breath). Yes Provider, Historical  
guaiFENesin (ORGANIDIN) 200 mg tablet Take 200 mg by mouth every six (6) hours as needed for Congestion. Yes Provider, Historical  
metoprolol tartrate (LOPRESSOR) 50 mg tablet Take 50 mg by mouth two (2) times a day.  Patient takes in morning (0900) and afternoon (1600)   Yes Provider, Historical  
albuterol (PROVENTIL VENTOLIN) 2.5 mg /3 mL (0.083 %) nebu 2.5 mg by Nebulization route every six (6) hours as needed for Wheezing. Yes Provider, Historical  
budesonide-formoteroL (SYMBICORT) 160-4.5 mcg/actuation HFAA Take 2 Puffs by inhalation two (2) times a day. Yes Provider, Historical  
polyethylene glycol (MIRALAX) 17 gram packet Take 17 g by mouth daily as needed for Constipation. Yes Provider, Historical  
pantoprazole (PROTONIX) 40 mg tablet Take 40 mg by mouth daily. Yes Provider, Historical  
melatonin 5 mg tablet Take 10 mg by mouth nightly. Yes Provider, Historical  
prazosin (MINIPRESS) 2 mg capsule Take 4 mg by mouth nightly. Yes Provider, Historical  
ziprasidone (GEODON) 40 mg capsule Take 40 mg by mouth two (2) times daily (with meals). Yes Provider, Historical  
FLUoxetine (PROzac) 20 mg capsule Take 60 mg by mouth daily. Yes Provider, Historical  
pediatric multivitamins chewable tablet Take 1 Tab by mouth daily. Yes Provider, Historical  
brinzolamide-brimonidine 1-0.2 % drps Administer 1 Drop to both eyes three (3) times daily. Yes Provider, Historical  
diazePAM (VALIUM) 5 mg tablet Take 5 mg by mouth nightly. Indications: anxious   Yes Provider, Historical  
 
Allergies Allergen Reactions  Hydrocodone Nausea Only  Oxycodone Nausea Only  Percocet [Oxycodone-Acetaminophen] Nausea Only  Vicodin [Hydrocodone-Acetaminophen] Nausea Only Social History Tobacco Use  Smoking status: Not on file Substance Use Topics  Alcohol use: Not on file No family history on file. Current Facility-Administered Medications Medication Dose Route Frequency  polyethylene glycol (MIRALAX) packet 17 g  17 g Oral BID  dilTIAZem (CARDIZEM) 100 mg in 0.9% sodium chloride (MBP/ADV) 100 mL infusion  5 mg/hr IntraVENous CONTINUOUS  
 dilTIAZem CD (CARDIZEM CD) capsule 120 mg  120 mg Oral DAILY  apixaban (ELIQUIS) tablet 5 mg  5 mg Oral Q12H  
 bumetanide (BUMEX) injection 1 mg  1 mg IntraVENous DAILY  pantoprazole (PROTONIX) tablet 40 mg  40 mg Oral ACB  sodium chloride (NS) flush 5-40 mL  5-40 mL IntraVENous Q8H  
 nicotine (NICODERM CQ) 21 mg/24 hr patch 1 Patch  1 Patch TransDERmal DAILY  methylPREDNISolone (PF) (Solu-MEDROL) injection 60 mg  60 mg IntraVENous Q6H  
 senna (SENOKOT) tablet 17.2 mg  2 Tab Oral QHS  FLUoxetine (PROzac) capsule 60 mg  60 mg Oral DAILY  atorvastatin (LIPITOR) tablet 40 mg  40 mg Oral QHS  diazePAM (VALIUM) tablet 2.5 mg  2.5 mg Oral BID  dorzolamide (TRUSOPT) 2 % ophthalmic solution 1 Drop  1 Drop Both Eyes TID  ziprasidone (GEODON) capsule 40 mg  40 mg Oral BID WITH MEALS  doxycycline (VIBRAMYCIN) 100 mg in 0.9% sodium chloride (MBP/ADV) 100 mL  100 mg IntraVENous Q12H  
 brimonidine (ALPHAGAN) 0.2 % ophthalmic solution 1 Drop  1 Drop Both Eyes TID  budesonide (PULMICORT) 500 mcg/2 ml nebulizer suspension  500 mcg Nebulization BID RT  
 arformoteroL (BROVANA) neb solution 15 mcg  15 mcg Nebulization BID RT  
 melatonin tablet 9 mg  9 mg Oral QHS  prazosin (MINIPRESS) capsule 4 mg  4 mg Oral QHS  albuterol-ipratropium (DUO-NEB) 2.5 MG-0.5 MG/3 ML  3 mL Nebulization Q4H RT  
 guaiFENesin ER (MUCINEX) tablet 1,200 mg  1,200 mg Oral BID Objective:  
Vital Signs:   
Visit Vitals /69 (BP 1 Location: Right arm, BP Patient Position: Sitting) Pulse 79 Temp 97.7 °F (36.5 °C) Resp 20 Ht 5' 9\" (1.753 m) Wt 63.7 kg (140 lb 6.9 oz) SpO2 93% BMI 20.74 kg/m² O2 Device: Hi flow nasal cannula, Heated O2 Flow Rate (L/min): 50 l/min Temp (24hrs), Av.6 °F (36.4 °C), Min:97.4 °F (36.3 °C), Max:98 °F (36.7 °C) Intake/Output:  
Last shift:      No intake/output data recorded. Last 3 shifts: 1901 -  0700 In: 2949.3 [P.O.:2720; I.V.:229.3] Out: Reji Quintana [XLLZX:[id number]] Intake/Output Summary (Last 24 hours) at 3/16/2020 0240 Last data filed at 3/16/2020 8809 Gross per 24 hour Intake 1901.58 ml Output 3160 ml Net -1258.42 ml Physical Exam:  
General:  Awake, alert Head:  NCAT Eyes:  EOMI Nose: Throat:   
Neck: Trachea midline Back:     
Lungs:   Very poor breath sounds w/ faint wheeze,tachypneic at rest  
Chest wall:    
Heart:  RRR Abdomen:   Soft, NT, ND, +bowel sounds Extremities: No edema Pulses:   
Skin:   
Lymph nodes:   
Neurologic: Oriented x 3 Data review:  
 
Recent Results (from the past 24 hour(s)) ECHO ADULT COMPLETE Collection Time: 03/15/20 10:53 AM  
Result Value Ref Range LV E' Septal Velocity 9.51 centimeter/second Ao Root D 2.98 cm Aortic Valve Systolic Peak Velocity 748.54 cm/s Aortic Valve Area by Continuity of Peak Velocity 1.9 cm2 AoV PG 6.0 mmHg LVIDd 4.69 4.2 - 5.9 cm  
 LVPWd 1.04 (A) 0.6 - 1.0 cm LVIDs 2.93 cm IVSd 1.14 (A) 0.6 - 1.0 cm  
 LVOT d 1.87 cm  
 LVOT Peak Velocity 85.93 cm/s LVOT Peak Gradient 3.0 mmHg MVA (PHT) 4.4 cm2  
 MV A Hira 62.68 cm/s  
 MV E Hira 76.31 cm/s  
 MV E/A 1.22 Left Atrium to Aortic Root Ratio 1.10 RVIDd 3.75 cm  
 LA Vol 4C 63.84 (A) 18 - 58 mL  
 LA Area 4C 20.1 cm2 LV Mass .5 88 - 224 g LV Mass AL Index 121.8 49 - 115 g/m2 RVSP 29.8 mmHg Est. RA Pressure 8.0 mmHg Mitral Valve E Wave Deceleration Time 173.9 ms  
 Mitral Valve Pressure Half-time 50.4 ms Left Atrium Major Axis 3.26 cm Triscuspid Valve Regurgitation Peak Gradient 21.8 mmHg Pulmonic Valve Max Velocity 70.34 cm/s  
 TR Max Velocity 233.22 cm/s PASP 29.8 mmHg LA Vol Index 35.92 16 - 28 ml/m2 JOHN/BSA Pk Hira 1.1 cm2/m2 Left Ventricular Fractional Shortening by 2D 34.117148465 % Mitral Valve Deceleration Wise [id number] AV Velocity Ratio 0.70 Left Ventricular End Diastolic Volume by Teichholz Method 17.160727411 mL Left Ventricular End Systolic Volume by Teichholz Method 94.52604137 mL Left Ventricular Stroke Volume by Teichholz Method 81.497262937 mL PV peak gradient 2.0 mmHg CBC WITH AUTOMATED DIFF Collection Time: 03/16/20  1:56 AM  
Result Value Ref Range WBC 5.2 4.1 - 11.1 K/uL  
 RBC 4.09 (L) 4.10 - 5.70 M/uL  
 HGB 13.5 12.1 - 17.0 g/dL HCT 39.9 36.6 - 50.3 % MCV 97.6 80.0 - 99.0 FL  
 MCH 33.0 26.0 - 34.0 PG  
 MCHC 33.8 30.0 - 36.5 g/dL  
 RDW 11.7 11.5 - 14.5 % PLATELET 457 359 - 450 K/uL MPV 8.9 8.9 - 12.9 FL  
 NRBC 0.0 0  WBC ABSOLUTE NRBC 0.00 0.00 - 0.01 K/uL NEUTROPHILS 86 (H) 32 - 75 % LYMPHOCYTES 5 (L) 12 - 49 % MONOCYTES 9 5 - 13 % EOSINOPHILS 0 0 - 7 % BASOPHILS 0 0 - 1 % IMMATURE GRANULOCYTES 0 0.0 - 0.5 % ABS. NEUTROPHILS 4.4 1.8 - 8.0 K/UL  
 ABS. LYMPHOCYTES 0.3 (L) 0.8 - 3.5 K/UL  
 ABS. MONOCYTES 0.5 0.0 - 1.0 K/UL  
 ABS. EOSINOPHILS 0.0 0.0 - 0.4 K/UL  
 ABS. BASOPHILS 0.0 0.0 - 0.1 K/UL  
 ABS. IMM. GRANS. 0.0 0.00 - 0.04 K/UL  
 DF AUTOMATED RENAL FUNCTION PANEL Collection Time: 03/16/20  1:56 AM  
Result Value Ref Range Sodium 131 (L) 136 - 145 mmol/L Potassium 3.7 3.5 - 5.1 mmol/L Chloride 89 (L) 97 - 108 mmol/L  
 CO2 38 (H) 21 - 32 mmol/L Anion gap 4 (L) 5 - 15 mmol/L Glucose 179 (H) 65 - 100 mg/dL BUN 19 6 - 20 MG/DL Creatinine 0.88 0.70 - 1.30 MG/DL  
 BUN/Creatinine ratio 22 (H) 12 - 20 GFR est AA >60 >60 ml/min/1.73m2 GFR est non-AA >60 >60 ml/min/1.73m2 Calcium 8.6 8.5 - 10.1 MG/DL Phosphorus 2.7 2.6 - 4.7 MG/DL Albumin 3.6 3.5 - 5.0 g/dL MAGNESIUM Collection Time: 03/16/20  1:56 AM  
Result Value Ref Range Magnesium 1.8 1.6 - 2.4 mg/dL Imaging: 
I have personally reviewed the patients radiographs and have reviewed the reports: 
  
 
  
Doreen Coburn MD

## 2020-03-16 NOTE — PROGRESS NOTES
Transition of Care: 
RUR 18% 1- follow for discharge needs 2- refer to [facility] health for resumption of services and add back PT and OT.  (order needed)

## 2020-03-16 NOTE — PROGRESS NOTES
Shift Summary 0730: Bedside and Verbal shift change report given to Geetha Doe RN (oncoming nurse) by Darlin Chappell RN (offgoing nurse). Report included the following information SBAR, Kardex, Procedure Summary, Intake/Output, MAR, Accordion, Recent Results and Cardiac Rhythm Stach. 0900: Patient complaining of pain and constipation Received order from Dr. Eb Mann for Oxy q8prn, hold for sedation, and mirilax BID Patient also is asking if the [place] is being informed of his condition/care. 1030: D/cd order for oxycodone d/t allergy (did not give patient) 1200: Received call from patient's daughter. Received permission for patient to talk to her about his condition and care. Updated her on patient's status and diagnosis and current care measures. She wanted to know the patient's prognosis and the next steps. Discussed progression of chronic illness and the severity of his status, but that next steps and level of improvement that can be reached is not clear yet. Daughter asked to please have a call for an update in the next couple of days. Passed this on in report. Daughter's name and number is listed in demographics. 1300: Bedside and Verbal shift change report given to Conchita Bazan RN (oncoming nurse) by Darlin Chappell RN (offgoing nurse). Report included the following information SBAR, Kardex, Procedure Summary, Intake/Output, MAR, Accordion, Recent Results and Cardiac Rhythm Stach.

## 2020-03-16 NOTE — PROGRESS NOTES
Cardiology Progress Note [address], [place], [address] [facility] Phone [phone]; Fax [phone] 
 
 
 
3/16/2020 9:27 AM  
 
Admit Date:           3/11/2020 Admit Diagnosis:  Acute on chronic respiratory failure with hypoxia ([facility] [J96.21] :          1950 MRN:          [id number] ASSESSMENT/RECOMMENDATION:  
Paroxysmal Atrial fibrillation: converted to NSR. Tolerating cardizem, eliquis- continue on d/c reviewed meds w patient  
-echo shows pEF Acute on chronic hypercapneic/hypoxemic respiratory failure:  
-good response to IV diuretics (-3L last 24 hours)- defer diuretic to pulmonary  
- creatinine stable Chest pain: neg troponin/ non ischemia ECG. Continue to monitor COPD Hyponatremia CAD, native- non obstructive : statin -no ASA as he on eliquis 
-followed at the [facility] on several medications Tobacco abuse: cessation reviewed Continue eliquis and cardizem - no further cardiac testing needed at this time. He will follow up at the [facility]. We will sign off. Please call if there are any new developments that require cardiology input. CARDIOLOGY ATTENDING Patient personally seen and examined. All the elements of history and examination were personally performed. Assessment and plan was discussed and agree as written above Seems to be doing better since admission. Still SOB. Hrt RRR. 1) Paroxysmal Afib - Is back in sinus; Will continue cardizem for rate control (increase Dose to 180 mg) and Eliquis 5 mg BID for stroke prevention. I explained findings and plans and he is OK with Dilt/Eliquis. 2) CAD 
- he denies history of PCI with a cath being done 2 years ago. Stop ASA since starting Eliquis. - cont statin 3) He goes to [facility] for his cardiology care and will FU there. 4) Resp Failure - per Pulmonary team.  Defer diuretic management to pulmonary. His Echo shows preserved LVEF (I viewed images myself). 5) I will sign off, but please call if any questions. Camilo Sylvester MD, [facility] No intake/output data recorded. Last 3 Recorded Weights in this Encounter 03/14/20 0415 03/15/20 0407 03/15/20 1053 Weight: 146 lb 14.4 oz (66.6 kg) 140 lb 8 oz (63.7 kg) 140 lb 6.9 oz (63.7 kg) 03/14 1901 - 03/16 0700 In: 2949.3 [P.O.:2720; I.V.:229.3] Out: Zenobia Patel [BDWBB:2130] SUBJECTIVE Robi Adjutant denies palpitations, irregular heart beat or LE edema. + SOB/ARTEAGA 
+ mild left sided chest pain this morning- he did not tell his nurse No lightheadedness or dizziness Current Facility-Administered Medications Medication Dose Route Frequency  oxyCODONE IR (ROXICODONE) tablet 5 mg  5 mg Oral Q8H PRN  polyethylene glycol (MIRALAX) packet 17 g  17 g Oral BID  dilTIAZem (CARDIZEM) 100 mg in 0.9% sodium chloride (MBP/ADV) 100 mL infusion  5 mg/hr IntraVENous CONTINUOUS  
 dilTIAZem CD (CARDIZEM CD) capsule 120 mg  120 mg Oral DAILY  apixaban (ELIQUIS) tablet 5 mg  5 mg Oral Q12H  
 bumetanide (BUMEX) injection 1 mg  1 mg IntraVENous DAILY  pantoprazole (PROTONIX) tablet 40 mg  40 mg Oral ACB  sodium chloride (NS) flush 5-40 mL  5-40 mL IntraVENous Q8H  
 sodium chloride (NS) flush 5-40 mL  5-40 mL IntraVENous PRN  
 ondansetron (ZOFRAN) injection 4 mg  4 mg IntraVENous Q4H PRN  
 nicotine (NICODERM CQ) 21 mg/24 hr patch 1 Patch  1 Patch TransDERmal DAILY  methylPREDNISolone (PF) (Solu-MEDROL) injection 60 mg  60 mg IntraVENous Q6H  
 senna (SENOKOT) tablet 17.2 mg  2 Tab Oral QHS  FLUoxetine (PROzac) capsule 60 mg  60 mg Oral DAILY  docusate sodium (COLACE) capsule 100 mg  100 mg Oral TID PRN  
  atorvastatin (LIPITOR) tablet 40 mg  40 mg Oral QHS  acetaminophen (TYLENOL) tablet 650 mg  650 mg Oral Q6H PRN  
 diazePAM (VALIUM) tablet 2.5 mg  2.5 mg Oral BID  dorzolamide (TRUSOPT) 2 % ophthalmic solution 1 Drop  1 Drop Both Eyes TID  ziprasidone (GEODON) capsule 40 mg  40 mg Oral BID WITH MEALS  doxycycline (VIBRAMYCIN) 100 mg in 0.9% sodium chloride (MBP/ADV) 100 mL  100 mg IntraVENous Q12H  
 brimonidine (ALPHAGAN) 0.2 % ophthalmic solution 1 Drop  1 Drop Both Eyes TID  budesonide (PULMICORT) 500 mcg/2 ml nebulizer suspension  500 mcg Nebulization BID RT  
 arformoteroL (BROVANA) neb solution 15 mcg  15 mcg Nebulization BID RT  
 melatonin tablet 9 mg  9 mg Oral QHS  prazosin (MINIPRESS) capsule 4 mg  4 mg Oral QHS  albuterol-ipratropium (DUO-NEB) 2.5 MG-0.5 MG/3 ML  3 mL Nebulization Q4H RT  
 guaiFENesin ER (MUCINEX) tablet 1,200 mg  1,200 mg Oral BID  sodium chloride (NS) flush 5-10 mL  5-10 mL IntraVENous PRN OBJECTIVE Intake/Output Summary (Last 24 hours) at 3/16/2020 7757 Last data filed at 3/16/2020 0864 Gross per 24 hour Intake 1901.58 ml Output 3160 ml Net -1258.42 ml Review of Systems - History obtained from the patient AS PER  HPI Telemetry NSR 
 
PHYSICAL EXAM  
  
 
Visit Vitals /69 (BP 1 Location: Right arm, BP Patient Position: Sitting) Pulse 79 Temp 97.7 °F (36.5 °C) Resp 20 Ht 5' 9\" (1.753 m) Wt 140 lb 6.9 oz (63.7 kg) SpO2 93% BMI 20.74 kg/m² Gen: Well-developed, thin/frail in no mild distress  alert and oriented x 3 HEENT:  Pink conjunctivae, Hearing grossly normal.No scleral icterus or conjunctival, moist mucous membranes Neck: Supple,No JVD Resp: + accessory muscle use, poor air movement/distant breath sounds Card: Regular Rate,Rythm, No murmurs, rubs or gallop. No thrills. GI:          soft, non-tender MSK: No cyanosis or clubbing, good capillary refill Skin: No rashes or ulcers, no bruising. Tattoos BUE and BLE Neuro:  Cranial nerves are grossly intact, moving all four extremities, no focal deficit, follows commands appropriately Psych:  Good insight, oriented to person, place and time, alert, Nml Affect LE: No edema DATA REVIEW Laboratory and Imaging have been reviewed by me and are notable for No results for input(s): CPK, CKMB, TROIQ in the last 72 hours. Recent Labs  
  03/16/20 
0156 03/15/20 
0622 03/14/20 
0445 * 132* 135* K 3.7 4.1 4.3 CO2 38* 40* 39* BUN 19 18 16 CREA 0.88 0.84 0.71 * 136* 140* PHOS 2.7 3.7 2.4* MG 1.8 1.9 1.8 WBC 5.2 6.7 7.8 HGB 13.5 13.6 12.2 HCT 39.9 41.7 37.3  180 188 Denita Rather, NP

## 2020-03-16 NOTE — PROGRESS NOTES
[facility] Pulmonary, Critical Care, and Sleep Medicine Initial Patient Consult Name: Deidre Valdez MRN: [id number] : 1950 Hospital: [facility] Date: 3/15/2020 IMPRESSION:  
· Acute on chronic hypoxemic/hypercapneic respiratory failure · Advanced COPD w/ acute exacerbation · Hyponatremia, resolved · Fever, resolved · Hyperglycemia RECOMMENDATIONS:  
· Supplemental O2 as needed to keep sats > 88% · NIPPV whenever sleeping and prn. Would benefit from trilogy on discharge · Continue scheduled duonebs, pulmicort, brovana, flutter valve · Continue current dose of IV steroids · Follow cultures · Continue doxy (day 5 of 5) · Continue bumex · CXR in am 
· Speech eval when off of NIPPV/HFNC 
· On protonix for GERD 
· On lovenox for DVT ppx · Will consult palliative to see Patient is critically ill, requiring BiPap/HFNC for acute hypercapneic respiratory failure. Total CC time: 31 minutes Subjective:  
 
Mr. Mitzi Christensen is a 70yo male w/ history of chronic hypoxemic respiratory failure (on 4L at baseline), COPD and anxiety who presented to the ER on 3/11 w/ complaints of shortness of breath x 2 weeks, wheezing, cough productive of brown sputum, pleuritic chest pain and sore throat. Denies sick contacts or recent travel. Does report frequent coughing while eating/drinking. Is mostly homebound but gets to the [place]  for appointments and was last there on Monday. He is followed by pulmonary at the [place] -- sees Dr. Leonardo Davila. Smokes 1ppd (down from 3ppd) since age 15. 
 
3/11 - CTA chest: emphysematous changes. No asdz. Negative for PE *all cultures NGTD *RVP negative Interval history: On HFNC - 50L/90%, sats 100% Reports severe shortness of breath on exertion No past medical history on file. No past surgical history on file. Prior to Admission medications Medication Sig Start Date End Date Taking? Authorizing Provider  
predniSONE (DELTASONE) 5 mg tablet Take 5 mg by mouth daily. Yes Provider, Historical  
albuterol (PROVENTIL HFA, VENTOLIN HFA, PROAIR HFA) 90 mcg/actuation inhaler Take 2 Puffs by inhalation every four (4) hours as needed for Wheezing or Shortness of Breath. Yes Provider, Historical  
aspirin delayed-release 81 mg tablet Take 81 mg by mouth daily. Yes Provider, Historical  
atorvastatin (LIPITOR) 80 mg tablet Take 40 mg by mouth nightly. Yes Provider, Historical  
docusate sodium (COLACE) 100 mg capsule Take 100 mg by mouth three (3) times daily as needed for Constipation. Yes Provider, Historical  
senna (SENOKOT) 8.6 mg tablet Take 2 Tabs by mouth nightly. Yes Provider, Historical  
psyllium (METAMUCIL) powd Take 1 Dose by mouth two (2) times a day. 2 teaspoons in liquid twice daily    Yes Provider, Historical  
diazePAM (VALIUM) 5 mg tablet Take 2.5 mg by mouth daily. Indications: anxious   Yes Provider, Historical  
cholecalciferol (VITAMIN D3) (1000 Units /25 mcg) tablet Take 2,000 Units by mouth daily. Yes Provider, Historical  
denosumab (PROLIA) 60 mg/mL injection 60 mg by SubCUTAneous route See Admin Instructions. One syringe subq every 6 months. Due 3/13    Yes Provider, Historical  
ipratropium (ATROVENT) 0.02 % soln 0.5 mg by Nebulization route every four (4) hours as needed (shortness of breath). Yes Provider, Historical  
guaiFENesin (ORGANIDIN) 200 mg tablet Take 200 mg by mouth every six (6) hours as needed for Congestion. Yes Provider, Historical  
metoprolol tartrate (LOPRESSOR) 50 mg tablet Take 50 mg by mouth two (2) times a day. Patient takes in morning (0900) and afternoon (1600)   Yes Provider, Historical  
albuterol (PROVENTIL VENTOLIN) 2.5 mg /3 mL (0.083 %) nebu 2.5 mg by Nebulization route every six (6) hours as needed for Wheezing.    Yes Provider, Historical  
 budesonide-formoteroL (SYMBICORT) 160-4.5 mcg/actuation HFAA Take 2 Puffs by inhalation two (2) times a day. Yes Provider, Historical  
polyethylene glycol (MIRALAX) 17 gram packet Take 17 g by mouth daily as needed for Constipation. Yes Provider, Historical  
pantoprazole (PROTONIX) 40 mg tablet Take 40 mg by mouth daily. Yes Provider, Historical  
melatonin 5 mg tablet Take 10 mg by mouth nightly. Yes Provider, Historical  
prazosin (MINIPRESS) 2 mg capsule Take 4 mg by mouth nightly. Yes Provider, Historical  
ziprasidone (GEODON) 40 mg capsule Take 40 mg by mouth two (2) times daily (with meals). Yes Provider, Historical  
FLUoxetine (PROzac) 20 mg capsule Take 60 mg by mouth daily. Yes Provider, Historical  
pediatric multivitamins chewable tablet Take 1 Tab by mouth daily. Yes Provider, Historical  
brinzolamide-brimonidine 1-0.2 % drps Administer 1 Drop to both eyes three (3) times daily. Yes Provider, Historical  
diazePAM (VALIUM) 5 mg tablet Take 5 mg by mouth nightly. Indications: anxious   Yes Provider, Historical  
 
Allergies Allergen Reactions  Hydrocodone Nausea Only  Oxycodone Nausea Only  Percocet [Oxycodone-Acetaminophen] Nausea Only  Vicodin [Hydrocodone-Acetaminophen] Nausea Only Social History Tobacco Use  Smoking status: Not on file Substance Use Topics  Alcohol use: Not on file No family history on file. Current Facility-Administered Medications Medication Dose Route Frequency  dilTIAZem (CARDIZEM) 100 mg in 0.9% sodium chloride (MBP/ADV) 100 mL infusion  5 mg/hr IntraVENous CONTINUOUS  
 dilTIAZem CD (CARDIZEM CD) capsule 120 mg  120 mg Oral DAILY  apixaban (ELIQUIS) tablet 5 mg  5 mg Oral Q12H  
 bumetanide (BUMEX) injection 1 mg  1 mg IntraVENous DAILY  pantoprazole (PROTONIX) tablet 40 mg  40 mg Oral ACB  sodium chloride (NS) flush 5-40 mL  5-40 mL IntraVENous Q8H  
  nicotine (NICODERM CQ) 21 mg/24 hr patch 1 Patch  1 Patch TransDERmal DAILY  methylPREDNISolone (PF) (Solu-MEDROL) injection 60 mg  60 mg IntraVENous Q6H  
 senna (SENOKOT) tablet 17.2 mg  2 Tab Oral QHS  FLUoxetine (PROzac) capsule 60 mg  60 mg Oral DAILY  aspirin delayed-release tablet 81 mg  81 mg Oral DAILY  atorvastatin (LIPITOR) tablet 40 mg  40 mg Oral QHS  diazePAM (VALIUM) tablet 2.5 mg  2.5 mg Oral BID  dorzolamide (TRUSOPT) 2 % ophthalmic solution 1 Drop  1 Drop Both Eyes TID  ziprasidone (GEODON) capsule 40 mg  40 mg Oral BID WITH MEALS  doxycycline (VIBRAMYCIN) 100 mg in 0.9% sodium chloride (MBP/ADV) 100 mL  100 mg IntraVENous Q12H  
 brimonidine (ALPHAGAN) 0.2 % ophthalmic solution 1 Drop  1 Drop Both Eyes TID  budesonide (PULMICORT) 500 mcg/2 ml nebulizer suspension  500 mcg Nebulization BID RT  
 arformoteroL (BROVANA) neb solution 15 mcg  15 mcg Nebulization BID RT  
 melatonin tablet 9 mg  9 mg Oral QHS  prazosin (MINIPRESS) capsule 4 mg  4 mg Oral QHS  albuterol-ipratropium (DUO-NEB) 2.5 MG-0.5 MG/3 ML  3 mL Nebulization Q4H RT  
 guaiFENesin ER (MUCINEX) tablet 1,200 mg  1,200 mg Oral BID Objective:  
Vital Signs:   
Visit Vitals /59 Pulse 96 Temp 97.4 °F (36.3 °C) Resp 21 Ht 5' 9\" (1.753 m) Wt 63.7 kg (140 lb 6.9 oz) SpO2 (!) 85% BMI 20.74 kg/m² O2 Device: Hi flow nasal cannula O2 Flow Rate (L/min): 50 l/min Temp (24hrs), Av.9 °F (36.6 °C), Min:97.4 °F (36.3 °C), Max:98.2 °F (36.8 °C) Intake/Output:  
Last shift:      03/15 1901 -  0700 In: -  
Out: 535 [Urine:535] Last 3 shifts:  07 - 03/15 190 In: 3669.3 [P.O.:3440; I.V.:229.3] Out: 4775 [PIXNZ:3381] Intake/Output Summary (Last 24 hours) at 3/15/2020 2032 Last data filed at 3/15/2020 2016 Gross per 24 hour Intake 2349.33 ml Output 3235 ml Net -885.67 ml Physical Exam:  
General:  Awake, alert Head:  NCAT Eyes:  EOMI Nose: Throat:   
Neck: Trachea midline Back:     
Lungs:   Diminished breath sounds w/ faint wheeze Chest wall:    
Heart:  RRR Abdomen:   Soft, NT, ND, +bowel sounds Extremities: No edema Pulses:   
Skin:   
Lymph nodes:   
Neurologic: Oriented x 3 Data review:  
 
Recent Results (from the past 24 hour(s)) CBC WITH AUTOMATED DIFF Collection Time: 03/15/20  6:22 AM  
Result Value Ref Range WBC 6.7 4.1 - 11.1 K/uL  
 RBC 4.15 4.10 - 5.70 M/uL  
 HGB 13.6 12.1 - 17.0 g/dL HCT 41.7 36.6 - 50.3 % .5 (H) 80.0 - 99.0 FL  
 MCH 32.8 26.0 - 34.0 PG  
 MCHC 32.6 30.0 - 36.5 g/dL  
 RDW 11.8 11.5 - 14.5 % PLATELET 452 661 - 105 K/uL MPV 8.8 (L) 8.9 - 12.9 FL  
 NRBC 0.0 0  WBC ABSOLUTE NRBC 0.00 0.00 - 0.01 K/uL NEUTROPHILS 82 (H) 32 - 75 % LYMPHOCYTES 7 (L) 12 - 49 % MONOCYTES 10 5 - 13 % EOSINOPHILS 0 0 - 7 % BASOPHILS 0 0 - 1 % IMMATURE GRANULOCYTES 1 (H) 0.0 - 0.5 % ABS. NEUTROPHILS 5.5 1.8 - 8.0 K/UL  
 ABS. LYMPHOCYTES 0.5 (L) 0.8 - 3.5 K/UL  
 ABS. MONOCYTES 0.7 0.0 - 1.0 K/UL  
 ABS. EOSINOPHILS 0.0 0.0 - 0.4 K/UL  
 ABS. BASOPHILS 0.0 0.0 - 0.1 K/UL  
 ABS. IMM. GRANS. 0.1 (H) 0.00 - 0.04 K/UL  
 DF AUTOMATED RENAL FUNCTION PANEL Collection Time: 03/15/20  6:22 AM  
Result Value Ref Range Sodium 132 (L) 136 - 145 mmol/L Potassium 4.1 3.5 - 5.1 mmol/L Chloride 90 (L) 97 - 108 mmol/L  
 CO2 40 (H) 21 - 32 mmol/L Anion gap 2 (L) 5 - 15 mmol/L Glucose 136 (H) 65 - 100 mg/dL BUN 18 6 - 20 MG/DL Creatinine 0.84 0.70 - 1.30 MG/DL  
 BUN/Creatinine ratio 21 (H) 12 - 20 GFR est AA >60 >60 ml/min/1.73m2 GFR est non-AA >60 >60 ml/min/1.73m2 Calcium 8.6 8.5 - 10.1 MG/DL Phosphorus 3.7 2.6 - 4.7 MG/DL Albumin 3.5 3.5 - 5.0 g/dL MAGNESIUM Collection Time: 03/15/20  6:22 AM  
Result Value Ref Range Magnesium 1.9 1.6 - 2.4 mg/dL ECHO ADULT COMPLETE  Collection Time: 03/15/20 10:53 AM  
 Result Value Ref Range LV E' Septal Velocity 9.51 centimeter/second Ao Root D 2.98 cm Aortic Valve Systolic Peak Velocity 293.97 cm/s Aortic Valve Area by Continuity of Peak Velocity 1.9 cm2 AoV PG 6.0 mmHg LVIDd 4.69 4.2 - 5.9 cm  
 LVPWd 1.04 (A) 0.6 - 1.0 cm LVIDs 2.93 cm IVSd 1.14 (A) 0.6 - 1.0 cm  
 LVOT d 1.87 cm  
 LVOT Peak Velocity 85.93 cm/s LVOT Peak Gradient 3.0 mmHg MVA (PHT) 4.4 cm2  
 MV A Hira 62.68 cm/s  
 MV E Hira 76.31 cm/s  
 MV E/A 1.22 Left Atrium to Aortic Root Ratio 1.10 RVIDd 3.75 cm  
 LA Vol 4C 63.84 (A) 18 - 58 mL  
 LA Area 4C 20.1 cm2 LV Mass .5 88 - 224 g LV Mass AL Index 121.8 49 - 115 g/m2 RVSP 29.8 mmHg Est. RA Pressure 8.0 mmHg Mitral Valve E Wave Deceleration Time 173.9 ms  
 Mitral Valve Pressure Half-time 50.4 ms Left Atrium Major Axis 3.26 cm Triscuspid Valve Regurgitation Peak Gradient 21.8 mmHg Pulmonic Valve Max Velocity 70.34 cm/s  
 TR Max Velocity 233.22 cm/s PASP 29.8 mmHg LA Vol Index 35.92 16 - 28 ml/m2 JOHN/BSA Pk Hira 1.1 cm2/m2 Left Ventricular Fractional Shortening by 2D 68.378438876 % Mitral Valve Deceleration [place] [id number] AV Velocity Ratio 0.70 Left Ventricular End Diastolic Volume by Teichholz Method 25.856548951 mL Left Ventricular End Systolic Volume by Teichholz Method 69.19134083 mL Left Ventricular Stroke Volume by Teichholz Method 15.211863903 mL  
 PV peak gradient 2.0 mmHg Imaging: 
I have personally reviewed the patients radiographs and have reviewed the reports: 
  
 
  
Guillermo Francis MD

## 2020-03-16 NOTE — PROGRESS NOTES
Bedside shift change report given to Hal Aschoff  (oncoming nurse) by April (offgoing nurse). Report included the following information SBAR.

## 2020-03-17 NOTE — PROGRESS NOTES
[facility] 
[address] [facility], [place], [address] [place] [address] 
[phone] Medical Progress Note NAME: Jorge Tony :  1950 MRM:  [id number] Date/Time: 3/16/2020 Assessment / Plan:  
 
Acute on chronic respiratory failure with hypoxia: 2/2 COPD exacerbation. No PE on CTA chest. No PNA on imaging. Slow to improve. PCCM following. Remains on hi-anali A-fib: new onset, converted to sinus after IV diltiazem, transitionedto PO. Continue Eliquis COPD exacerbation: very slow to improve. No evidence of pneumonia on chest imaging. Flu and RVP negative. Continue doxy, duo-nebs, IV steroids, ICS/LABA 
 
SIRS / Elevated lactic acid: resolved. No source of infection. Monitor clinically. No fever since admission 
  
Hyponatremia: mild, borderline, stable Possible SIADH due to lung disease vs SSRI. Improved. Continue Prozac for now 
  
Anxiety: severe and contributing to dyspnea. Stable. Continue Prozac, Valium, Geodon 
  
HTN: stable. Now on diltiazme HLD: continue statin 
  
 
Total time spent: 25 minutes Time spent in the care of this patient including reviewing records, discussing with nursing and/or other providers on the treatment team, obtaining history and examining the patient, and discussing treatment plans. Care Plan discussed with: Patient, Nursing Staff and >50% of time spent in counseling and coordination of care Discussed:  Care Plan and D/C Planning Prophylaxis:  Lovenox Disposition:  Home w/Family Subjective: Chief Complaint:  Follow up COPD Chart/notes/labs/studies reviewed, patient examined at bedside. Feels fine. Dyspnea persists. No CP. No fevers Objective:  
 
 
Vitals:  
 
  
Last 24hrs VS reviewed since prior progress note. Most recent are: 
 
Visit Vitals /56 (BP 1 Location: Right arm, BP Patient Position: Sitting) Pulse 98 Temp 98.4 °F (36.9 °C) Resp 24 Ht 5' 9\" (1.753 m) Wt 63.7 kg (140 lb 6.9 oz) SpO2 90% BMI 20.74 kg/m² SpO2 Readings from Last 6 Encounters:  
03/16/20 90% O2 Flow Rate (L/min): 50 l/min Intake/Output Summary (Last 24 hours) at 3/16/2020 2142 Last data filed at 3/16/2020 1744 Gross per 24 hour Intake 1820 ml Output 3801 ml Net -1981 ml Exam:  
 
Physical Exam: 
 
Gen: disheveled, frail, chronically ill-appearing HEENT:  Sclerae nonicteric, hearing intact to voice, mucous membranes moist 
Neck:  Supple, without masses. Resp:  No accessory muscle use with increased WOB. Diminished with mild wheezing. No rales or rhonchi 
Card: RRR, without m/r/g. No LE edema. Abd:  +bowel sounds, soft, NTTP, nondistended. No HSM. Neuro: Face symmetric, tongue midline, speech fluent, follows commands appropriately Psych:  Alert, oriented x 3. Limited insight Medications Reviewed: (see below) Lab Data Reviewed: (see below) 
 
______________________________________________________________________ Medications:  
 
Current Facility-Administered Medications Medication Dose Route Frequency  polyethylene glycol (MIRALAX) packet 17 g  17 g Oral BID  dilTIAZem CD (CARDIZEM CD) capsule 180 mg  180 mg Oral DAILY  dilTIAZem (CARDIZEM) 100 mg in 0.9% sodium chloride (MBP/ADV) 100 mL infusion  5 mg/hr IntraVENous CONTINUOUS  
 apixaban (ELIQUIS) tablet 5 mg  5 mg Oral Q12H  
 bumetanide (BUMEX) injection 1 mg  1 mg IntraVENous DAILY  pantoprazole (PROTONIX) tablet 40 mg  40 mg Oral ACB  sodium chloride (NS) flush 5-40 mL  5-40 mL IntraVENous Q8H  
 sodium chloride (NS) flush 5-40 mL  5-40 mL IntraVENous PRN  
 ondansetron (ZOFRAN) injection 4 mg  4 mg IntraVENous Q4H PRN  
 nicotine (NICODERM CQ) 21 mg/24 hr patch 1 Patch  1 Patch TransDERmal DAILY  methylPREDNISolone (PF) (Solu-MEDROL) injection 60 mg  60 mg IntraVENous Q6H  
 senna (SENOKOT) tablet 17.2 mg  2 Tab Oral QHS  FLUoxetine (PROzac) capsule 60 mg  60 mg Oral DAILY  docusate sodium (COLACE) capsule 100 mg  100 mg Oral TID PRN  
 atorvastatin (LIPITOR) tablet 40 mg  40 mg Oral QHS  acetaminophen (TYLENOL) tablet 650 mg  650 mg Oral Q6H PRN  
 diazePAM (VALIUM) tablet 2.5 mg  2.5 mg Oral BID  dorzolamide (TRUSOPT) 2 % ophthalmic solution 1 Drop  1 Drop Both Eyes TID  ziprasidone (GEODON) capsule 40 mg  40 mg Oral BID WITH MEALS  
 brimonidine (ALPHAGAN) 0.2 % ophthalmic solution 1 Drop  1 Drop Both Eyes TID  budesonide (PULMICORT) 500 mcg/2 ml nebulizer suspension  500 mcg Nebulization BID RT  
 arformoteroL (BROVANA) neb solution 15 mcg  15 mcg Nebulization BID RT  
 melatonin tablet 9 mg  9 mg Oral QHS  prazosin (MINIPRESS) capsule 4 mg  4 mg Oral QHS  albuterol-ipratropium (DUO-NEB) 2.5 MG-0.5 MG/3 ML  3 mL Nebulization Q4H RT  
 guaiFENesin ER (MUCINEX) tablet 1,200 mg  1,200 mg Oral BID  sodium chloride (NS) flush 5-10 mL  5-10 mL IntraVENous PRN Lab Review:  
 
Recent Labs  
  03/16/20 
0156 03/15/20 
0622 03/14/20 
0445 WBC 5.2 6.7 7.8 HGB 13.5 13.6 12.2 HCT 39.9 41.7 37.3  180 188 Recent Labs  
  03/16/20 
0156 03/15/20 
0622 03/14/20 
0445 * 132* 135* K 3.7 4.1 4.3 CL 89* 90* 94* CO2 38* 40* 39* * 136* 140* BUN 19 18 16 CREA 0.88 0.84 0.71 CA 8.6 8.6 8.2* MG 1.8 1.9 1.8 PHOS 2.7 3.7 2.4* ALB 3.6 3.5 3.0*  
SGOT  --   --  33 ALT  --   --  31 No components found for: [place] Recent Labs  
  03/14/20 
1631 03/14/20 
3514 PH 7.40 7.34* PCO2 64* 73* PO2 104* 95 HCO3 38* 39* FIO2 40 40 No results for input(s): INR, INREXT, INREXT in the last 72 hours. No results found for: SDES Lab Results Component Value Date/Time Culture result: NO GROWTH 5 DAYS 03/11/2020 10:24 PM  
 Culture result: NO GROWTH 5 DAYS 03/11/2020 10:09 PM  
 
        
___________________________________________________ Attending Physician: Ida Aranda MD

## 2020-03-17 NOTE — PROGRESS NOTES
[facility] Progress note Name: Janeth Bryant MRN: [id number] : 1950 Hospital: [address] [facility] Date: 3/17/2020 IMPRESSION:  
· Acute on chronic hypoxemic/hypercapnic respiratory failure · Advanced COPD w/ acute exacerbation · Hyponatremia · New onset afib 
· HTN 
· Tobacco abuse RECOMMENDATIONS:  
· Supplemental O2 as needed to keep sats > 88%. Remain on continuous HF and have been able to wean significantly. · NIPPV whenever sleeping and prn. Would benefit from trilogy on discharge · Continue scheduled duonebs, pulmicort, brovana, flutter valve · Continue current dose of IV steroids - 60mg q 6hr · Completed 5 day course of Doxy · Continue bumex · Speech eval - functional swallow; aspiration risk due to HF and respiratory status. Instructed to eat only when breathing stable. · Mag repleted · On protonix for GERD 
· On lovenox for DVT ppx · Palliative eval pending Patient is critically ill with severe hypoxic, hypercapnic respiratory failure requiring continuous BiPAP/HFNC and is high risk for further decompensation. CC time EOP 35+ min. Subjective:  
 
Mr. Lakeisha Magana is a 72yo male w/ history of chronic hypoxemic respiratory failure (on 4L at baseline), COPD and anxiety who presented to the ER on 3/11 w/ complaints of shortness of breath x 2 weeks, wheezing, cough productive of brown sputum, pleuritic chest pain and sore throat. Denies sick contacts or recent travel. Does report frequent coughing while eating/drinking. Is mostly homebound but gets to the [place]  for appointments and was last there on Monday. He is followed by pulmonary at the [place] -- sees Dr. Hilaria Webb. Smokes 1ppd (down from 3ppd) since age 15. 
 
3/11 - CTA chest: emphysematous changes. No asdz. Negative for PE *all cultures NGTD *RVP negative Interval history: Afebrile Slept with NIV overniht Remains on 50L HF; FiO2 70% Nursing reports desats with activity, but he recovers at rest 
Na 130 
3/15 ECHO: EF 55-60%; no valve abnormalities ROS: Reports some improvement in SOB since yesterday. Asking when he can go home. Discussed that he is still requiring significant amount of O2 and there are no plans for discharge in the near future. He reports some chest soreness with cough. Reports some sputum production, but does not look at it. Reports some nausea and constipation. Denies LE pain/swelling. PMH - COPD, chronic respiratory failure, home oxygen, hypertension Prior to Admission medications Medication Sig Start Date End Date Taking? Authorizing Provider  
predniSONE (DELTASONE) 5 mg tablet Take 5 mg by mouth daily. Yes Provider, Historical  
albuterol (PROVENTIL HFA, VENTOLIN HFA, PROAIR HFA) 90 mcg/actuation inhaler Take 2 Puffs by inhalation every four (4) hours as needed for Wheezing or Shortness of Breath. Yes Provider, Historical  
aspirin delayed-release 81 mg tablet Take 81 mg by mouth daily. Yes Provider, Historical  
atorvastatin (LIPITOR) 80 mg tablet Take 40 mg by mouth nightly. Yes Provider, Historical  
docusate sodium (COLACE) 100 mg capsule Take 100 mg by mouth three (3) times daily as needed for Constipation. Yes Provider, Historical  
senna (SENOKOT) 8.6 mg tablet Take 2 Tabs by mouth nightly. Yes Provider, Historical  
psyllium (METAMUCIL) powd Take 1 Dose by mouth two (2) times a day. 2 teaspoons in liquid twice daily    Yes Provider, Historical  
diazePAM (VALIUM) 5 mg tablet Take 2.5 mg by mouth daily. Indications: anxious   Yes Provider, Historical  
cholecalciferol (VITAMIN D3) (1000 Units /25 mcg) tablet Take 2,000 Units by mouth daily. Yes Provider, Historical  
denosumab (PROLIA) 60 mg/mL injection 60 mg by SubCUTAneous route See Admin Instructions. One syringe subq every 6 months.   Due 3/13    Yes Provider, Historical  
 ipratropium (ATROVENT) 0.02 % soln 0.5 mg by Nebulization route every four (4) hours as needed (shortness of breath). Yes Provider, Historical  
guaiFENesin (ORGANIDIN) 200 mg tablet Take 200 mg by mouth every six (6) hours as needed for Congestion. Yes Provider, Historical  
metoprolol tartrate (LOPRESSOR) 50 mg tablet Take 50 mg by mouth two (2) times a day. Patient takes in morning (0900) and afternoon (1600)   Yes Provider, Historical  
albuterol (PROVENTIL VENTOLIN) 2.5 mg /3 mL (0.083 %) nebu 2.5 mg by Nebulization route every six (6) hours as needed for Wheezing. Yes Provider, Historical  
budesonide-formoteroL (SYMBICORT) 160-4.5 mcg/actuation HFAA Take 2 Puffs by inhalation two (2) times a day. Yes Provider, Historical  
polyethylene glycol (MIRALAX) 17 gram packet Take 17 g by mouth daily as needed for Constipation. Yes Provider, Historical  
pantoprazole (PROTONIX) 40 mg tablet Take 40 mg by mouth daily. Yes Provider, Historical  
melatonin 5 mg tablet Take 10 mg by mouth nightly. Yes Provider, Historical  
prazosin (MINIPRESS) 2 mg capsule Take 4 mg by mouth nightly. Yes Provider, Historical  
ziprasidone (GEODON) 40 mg capsule Take 40 mg by mouth two (2) times daily (with meals). Yes Provider, Historical  
FLUoxetine (PROzac) 20 mg capsule Take 60 mg by mouth daily. Yes Provider, Historical  
pediatric multivitamins chewable tablet Take 1 Tab by mouth daily. Yes Provider, Historical  
brinzolamide-brimonidine 1-0.2 % drps Administer 1 Drop to both eyes three (3) times daily. Yes Provider, Historical  
diazePAM (VALIUM) 5 mg tablet Take 5 mg by mouth nightly. Indications: anxious   Yes Provider, Historical  
 
Allergies Allergen Reactions  Hydrocodone Nausea Only  Oxycodone Nausea Only  Percocet [Oxycodone-Acetaminophen] Nausea Only  Vicodin [Hydrocodone-Acetaminophen] Nausea Only Social History Tobacco Use  Smoking status: Not on file Substance Use Topics  Alcohol use: Not on file No family history on file. Current Facility-Administered Medications Medication Dose Route Frequency  magnesium sulfate 2 g/50 ml IVPB (premix or compounded)  2 g IntraVENous ONCE  polyethylene glycol (MIRALAX) packet 17 g  17 g Oral BID  dilTIAZem CD (CARDIZEM CD) capsule 180 mg  180 mg Oral DAILY  apixaban (ELIQUIS) tablet 5 mg  5 mg Oral Q12H  
 bumetanide (BUMEX) injection 1 mg  1 mg IntraVENous DAILY  pantoprazole (PROTONIX) tablet 40 mg  40 mg Oral ACB  sodium chloride (NS) flush 5-40 mL  5-40 mL IntraVENous Q8H  
 nicotine (NICODERM CQ) 21 mg/24 hr patch 1 Patch  1 Patch TransDERmal DAILY  methylPREDNISolone (PF) (Solu-MEDROL) injection 60 mg  60 mg IntraVENous Q6H  
 senna (SENOKOT) tablet 17.2 mg  2 Tab Oral QHS  FLUoxetine (PROzac) capsule 60 mg  60 mg Oral DAILY  atorvastatin (LIPITOR) tablet 40 mg  40 mg Oral QHS  diazePAM (VALIUM) tablet 2.5 mg  2.5 mg Oral BID  dorzolamide (TRUSOPT) 2 % ophthalmic solution 1 Drop  1 Drop Both Eyes TID  ziprasidone (GEODON) capsule 40 mg  40 mg Oral BID WITH MEALS  
 brimonidine (ALPHAGAN) 0.2 % ophthalmic solution 1 Drop  1 Drop Both Eyes TID  budesonide (PULMICORT) 500 mcg/2 ml nebulizer suspension  500 mcg Nebulization BID RT  
 arformoteroL (BROVANA) neb solution 15 mcg  15 mcg Nebulization BID RT  
 melatonin tablet 9 mg  9 mg Oral QHS  prazosin (MINIPRESS) capsule 4 mg  4 mg Oral QHS  albuterol-ipratropium (DUO-NEB) 2.5 MG-0.5 MG/3 ML  3 mL Nebulization Q4H RT  
 guaiFENesin ER (MUCINEX) tablet 1,200 mg  1,200 mg Oral BID Objective:  
Vital Signs:   
Visit Vitals /59 (BP 1 Location: Right arm, BP Patient Position: At rest) Pulse 88 Temp 98.1 °F (36.7 °C) Resp 14 Ht 5' 9\" (1.753 m) Wt 63.4 kg (139 lb 11.2 oz) SpO2 99% BMI 20.63 kg/m² O2 Device: Heated, Hi flow nasal cannula O2 Flow Rate (L/min): 50 l/min Temp (24hrs), Av.2 °F (36.8 °C), Min:97.2 °F (36.2 °C), Max:98.7 °F (37.1 °C) Intake/Output:  
Last shift:      No intake/output data recorded. Last 3 shifts: 03/15 1901 -  0700 In: 2220 [P.O.:2220] Out: 7499 [Urine:6210] Intake/Output Summary (Last 24 hours) at 3/17/2020 9659 Last data filed at 3/17/2020 5851 Gross per 24 hour Intake 1620 ml Output 4201 ml Net -2581 ml Physical Exam:  
General:  Awake, alert, on HF Head:  NCAT Eyes:  EOMI, anicteric Nose: Nares clear, HF in place, septum midline Throat: Clear, MM Neck: Trachea midline,supple Lungs:   Diminished, bilateral wheeze, tachypnea with speaking Chest wall:  Normal shape, nontender Heart:  RRR Abdomen:   Soft, NT, ND, +bowel sounds Extremities: No edema Pulses: 2+ Skin: Dry, intact, tattoos Lymph nodes: No cervical lymphadenopathy Neurologic: No focal findings, oriented x 3 Data review:  
 
Recent Results (from the past 24 hour(s)) LACTIC ACID Collection Time: 20  1:22 AM  
Result Value Ref Range Lactic acid 2.0 0.4 - 2.0 MMOL/L  
CBC W/O DIFF Collection Time: 20  1:22 AM  
Result Value Ref Range WBC 6.6 4.1 - 11.1 K/uL  
 RBC 3.89 (L) 4.10 - 5.70 M/uL  
 HGB 12.8 12.1 - 17.0 g/dL HCT 37.6 36.6 - 50.3 % MCV 96.7 80.0 - 99.0 FL  
 MCH 32.9 26.0 - 34.0 PG  
 MCHC 34.0 30.0 - 36.5 g/dL  
 RDW 11.5 11.5 - 14.5 % PLATELET 602 435 - 681 K/uL MPV 9.1 8.9 - 12.9 FL  
 NRBC 0.0 0  WBC ABSOLUTE NRBC 0.00 0.00 - 0.01 K/uL RENAL FUNCTION PANEL Collection Time: 20  1:22 AM  
Result Value Ref Range Sodium 130 (L) 136 - 145 mmol/L Potassium 3.7 3.5 - 5.1 mmol/L Chloride 88 (L) 97 - 108 mmol/L  
 CO2 40 (H) 21 - 32 mmol/L Anion gap 2 (L) 5 - 15 mmol/L Glucose 154 (H) 65 - 100 mg/dL BUN 22 (H) 6 - 20 MG/DL Creatinine 0.90 0.70 - 1.30 MG/DL  
 BUN/Creatinine ratio 24 (H) 12 - 20 GFR est AA >60 >60 ml/min/1.73m2 GFR est non-AA >60 >60 ml/min/1.73m2 Calcium 8.5 8.5 - 10.1 MG/DL Phosphorus 3.5 2.6 - 4.7 MG/DL Albumin 3.2 (L) 3.5 - 5.0 g/dL MAGNESIUM Collection Time: 03/17/20  1:22 AM  
Result Value Ref Range Magnesium 1.6 1.6 - 2.4 mg/dL Imaging: 
I have personally reviewed the patients radiographs and have reviewed the reports: CXR 3/15/2020 - Hyperexpansion of the lungs is again shown. No consolidation or pulmonary edema is demonstrated. Bilateral blunting of the costophrenic sulci is unchanged. Cardiac and mediastinal contours are stable.    
 
  
Juliet Sanchez

## 2020-03-17 NOTE — PROGRESS NOTES
3/17/2020 
9:40 AM 
EMR reviewed pt is continuing to require medical management for acute respiratory failure w/ hypoxia, remains on Hi-[place] O2 at 50 L., AFib and COPD exacerbation FELIX Plan   Re-Admission Risk is 18% 1. CM to follow pt for d/c needs 2. Await further evaluation. 3. [facility] 4. Resume [facility] at d/c for SN,PT,OT, will need order. 5. Family vs. [facility] transport at d/c  
Kely Land

## 2020-03-17 NOTE — PROGRESS NOTES
Palliative medicine brief note- full visit documentation pending I met with the patient, he continues to want full restorative treatments and interventions, including CPR and intubation. Patient having difficulty  understanding  why he couldn't be put back on his home O2 4 L nasal canula and be discharged home. Patient seemed unaware that he has had drops in O2 with activity. Patient stated that he completed AMD at the [facility]. Patient stated that he would want his daughter to be healthcare decision maker. I called the patients daughter Enrique Kaplan, left voice message asking her to return my call. Palliative team will attempt to follow up with patients daughter tomorrow.

## 2020-03-17 NOTE — PROGRESS NOTES
[facility] [place] [facility] 
[address] [facility], [address] [place] [address] 
[phone] Medical Progress Note NAME: Mj Matos :  1950 MRM:  [id number] Date/Time: 3/17/2020 Assessment / Plan:  
 
Acute on chronic respiratory failure with hypoxia: 2/2 COPD exacerbation. No PE on CTA chest. No PNA on imaging. Very slowly to improve. PCCM following. Flu and RVP negative. Remains on hi-anali. Completed doxy, duo-nebs, IV steroids, ICS/LABA. Poor overall prognosis. Palliative care consult, d/w NP A-fib: new onset, converted to sinus after IV diltiazem, transitioned to PO. Continue Eliquis SIRS / Elevated lactic acid: resolved. No source of infection. Monitor clinically. No fever since admission 
  
Hyponatremia: stable. Possible SIADH due to lung disease vs SSRI. Continue Prozac for now 
  
Anxiety: was severe, now stable. Continue Prozac, Valium, Geodon 
  
HTN: stable. Cont diltiazem HLD: cont statin 
  
 
Total time spent: 25 minutes, d/w PCCM Time spent in the care of this patient including reviewing records, discussing with nursing and/or other providers on the treatment team, obtaining history and examining the patient, and discussing treatment plans. Care Plan discussed with: Patient, Nursing Staff and >50% of time spent in counseling and coordination of care Discussed:  Care Plan and D/C Planning Prophylaxis:  Lovenox Disposition:  Home w/Family Subjective: Chief Complaint:  Follow up COPD Chart/notes/labs/studies reviewed, patient examined at bedside. Had a bad night. +SOB. No CP. No F/C Objective:  
 
 
Vitals:  
 
  
Last 24hrs VS reviewed since prior progress note. Most recent are: 
 
Visit Vitals /68 (BP 1 Location: Right arm, BP Patient Position: Sitting) Pulse 95 Temp 97.4 °F (36.3 °C) Resp 20 Ht 5' 9\" (1.753 m) Wt 63.4 kg (139 lb 11.2 oz) SpO2 95% BMI 20.63 kg/m² SpO2 Readings from Last 6 Encounters:  
03/17/20 95% O2 Flow Rate (L/min): 50 l/min Intake/Output Summary (Last 24 hours) at 3/17/2020 1338 Last data filed at 3/17/2020 1042 Gross per 24 hour Intake 1380 ml Output 3211 ml Net -1831 ml Exam:  
 
Physical Exam: 
 
Gen: disheveled, frail, chronically ill-appearing HEENT:  Sclerae nonicteric, hearing intact to voice, mucous membranes moist 
Neck:  Supple, without masses. Resp:  No accessory muscle use with increased WOB. Exp wheezing. No rales or rhonchi 
Card: RRR, without m/r/g. No LE edema. Abd:  +bowel sounds, soft, NTTP, nondistended. No HSM. Neuro: Face symmetric, tongue midline, speech fluent, follows commands appropriately Psych:  Alert, oriented x 3. Limited insight Medications Reviewed: (see below) Lab Data Reviewed: (see below) 
 
______________________________________________________________________ Medications:  
 
Current Facility-Administered Medications Medication Dose Route Frequency  polyethylene glycol (MIRALAX) packet 17 g  17 g Oral BID  dilTIAZem CD (CARDIZEM CD) capsule 180 mg  180 mg Oral DAILY  apixaban (ELIQUIS) tablet 5 mg  5 mg Oral Q12H  
 bumetanide (BUMEX) injection 1 mg  1 mg IntraVENous DAILY  pantoprazole (PROTONIX) tablet 40 mg  40 mg Oral ACB  sodium chloride (NS) flush 5-40 mL  5-40 mL IntraVENous Q8H  
 sodium chloride (NS) flush 5-40 mL  5-40 mL IntraVENous PRN  
 ondansetron (ZOFRAN) injection 4 mg  4 mg IntraVENous Q4H PRN  
 nicotine (NICODERM CQ) 21 mg/24 hr patch 1 Patch  1 Patch TransDERmal DAILY  methylPREDNISolone (PF) (Solu-MEDROL) injection 60 mg  60 mg IntraVENous Q6H  
 senna (SENOKOT) tablet 17.2 mg  2 Tab Oral QHS  FLUoxetine (PROzac) capsule 60 mg  60 mg Oral DAILY  docusate sodium (COLACE) capsule 100 mg  100 mg Oral TID PRN  
 atorvastatin (LIPITOR) tablet 40 mg  40 mg Oral QHS  acetaminophen (TYLENOL) tablet 650 mg  650 mg Oral Q6H PRN  
  diazePAM (VALIUM) tablet 2.5 mg  2.5 mg Oral BID  dorzolamide (TRUSOPT) 2 % ophthalmic solution 1 Drop  1 Drop Both Eyes TID  ziprasidone (GEODON) capsule 40 mg  40 mg Oral BID WITH MEALS  
 brimonidine (ALPHAGAN) 0.2 % ophthalmic solution 1 Drop  1 Drop Both Eyes TID  budesonide (PULMICORT) 500 mcg/2 ml nebulizer suspension  500 mcg Nebulization BID RT  
 arformoteroL (BROVANA) neb solution 15 mcg  15 mcg Nebulization BID RT  
 melatonin tablet 9 mg  9 mg Oral QHS  prazosin (MINIPRESS) capsule 4 mg  4 mg Oral QHS  albuterol-ipratropium (DUO-NEB) 2.5 MG-0.5 MG/3 ML  3 mL Nebulization Q4H RT  
 guaiFENesin ER (MUCINEX) tablet 1,200 mg  1,200 mg Oral BID  sodium chloride (NS) flush 5-10 mL  5-10 mL IntraVENous PRN Lab Review:  
 
Recent Labs  
  03/17/20 
0122 03/16/20 
0156 03/15/20 
7805 WBC 6.6 5.2 6.7 HGB 12.8 13.5 13.6 HCT 37.6 39.9 41.7  171 180 Recent Labs  
  03/17/20 
0122 03/16/20 
0156 03/15/20 
5994 * 131* 132* K 3.7 3.7 4.1 CL 88* 89* 90* CO2 40* 38* 40* * 179* 136* BUN 22* 19 18 CREA 0.90 0.88 0.84 CA 8.5 8.6 8.6 MG 1.6 1.8 1.9 PHOS 3.5 2.7 3.7 ALB 3.2* 3.6 3.5 No components found for: [place] Recent Labs  
  03/14/20 
1631 PH 7.40 PCO2 64* PO2 104* HCO3 38* FIO2 40 No results for input(s): INR, INREXT, INREXT in the last 72 hours. No results found for: SDES Lab Results Component Value Date/Time Culture result: NO GROWTH 6 DAYS 03/11/2020 10:24 PM  
 Culture result: NO GROWTH 6 DAYS 03/11/2020 10:09 PM  
 
        
___________________________________________________ Attending Physician: Tessie San MD

## 2020-03-17 NOTE — PROGRESS NOTES
Bedside and Verbal shift change report given to [address] [place] [address] (oncoming nurse) by Steve Roche RN (offgoing nurse). Report included the following information SBAR, Kardex and ED Summary. Shift Summary: Patient remains on Hi flow oxygen 50l/min at 70%. Sats drop to mid 80s when transfers to bedside commode but recovers to mid 90s when returns to bed. Plan to continue to wean as tolerated.

## 2020-03-17 NOTE — ROUTINE PROCESS
2163 
Patient wanted to switch from Bipap to high flow. At 60% FIO2 patient was in the low 80's. Increased FIO2 to 70%. Now 94%. 3030 Bedside and Verbal shift change report given to Lupe Castaneda (oncoming nurse) by Hal Aschoff (offgoing nurse). Report included the following information SBAR, Kardex, ED Summary, Procedure Summary, Intake/Output, MAR, Recent Results and Cardiac Rhythm NSR.

## 2020-03-18 NOTE — PROGRESS NOTES
[facility], Critical Care, and Sleep Medicine Progress note Name: Eli Bryant MRN: [id number] : 1950 Hospital: [address] [facility] Date: 3/18/2020 IMPRESSION:  
· Acute on chronic hypoxemic/hypercapnic respiratory failure · Advanced COPD w/ acute exacerbation · Hyponatremia - worse · New onset afib 
· HTN 
· Tobacco abuse RECOMMENDATIONS:  
· Remain on continuous NIV · Stat CXR ordered and pending · Check stat ABG · Rate control with Cardizem per cardiology · Would benefit from trilogy on discharge · Continue scheduled duonebs, pulmicort, brovana, flutter valve · Increase IV steroids to 80mg q 6hr · Completed 5 day course of Doxy; consider additional abx pending CXR findings · Holding Bumex for now given elevated lactic acid · Speech eval - functional swallow; aspiration risk due to HF and respiratory status. Make NPO given worsening resp status. · K, Phos, and Mag okay · Agree with stopping SSRI - trend Na · On protonix for GERD 
· On lovenox for DVT ppx · Palliative eval pending Patient is critically ill with severe hypoxic, hypercapnic respiratory failure requiring continuous NIV and is high risk for further decompensation. CC time EOP 40+ min. Subjective:  
 
Mr. Alyssa Kemp is a 70yo male w/ history of chronic hypoxemic respiratory failure (on 4L at baseline), COPD and anxiety who presented to the ER on 3/11 w/ complaints of shortness of breath x 2 weeks, wheezing, cough productive of brown sputum, pleuritic chest pain and sore throat. Denies sick contacts or recent travel. Does report frequent coughing while eating/drinking. Is mostly homebound but gets to the [place]  for appointments and was last there on Monday. He is followed by pulmonary at the [place] -- sees Dr. Merril Galeazzi. Smokes 1ppd (down from 3ppd) since age 15. 
 
3/11 - CTA chest: emphysematous changes. No asdz. Negative for PE *all cultures NGTD *RVP negative Interval history: Afebrile Worsening resp status overnight/early this morning with hypoxia with transitioned off BiPAP despite max HF Back in afib RVR early this morning Na 128 Bicarb 41 Lactic acid 2.7 3/15 ECHO: EF 55-60%; no valve abnormalities ROS: Denies SOB despite obvious tachypnea/increased WOB. States, \"I just feel weak. \"  ROS limited by NIV and resp failure. PMH - COPD, chronic respiratory failure, home oxygen, hypertension Prior to Admission medications Medication Sig Start Date End Date Taking? Authorizing Provider  
predniSONE (DELTASONE) 5 mg tablet Take 5 mg by mouth daily. Yes Provider, Historical  
albuterol (PROVENTIL HFA, VENTOLIN HFA, PROAIR HFA) 90 mcg/actuation inhaler Take 2 Puffs by inhalation every four (4) hours as needed for Wheezing or Shortness of Breath. Yes Provider, Historical  
aspirin delayed-release 81 mg tablet Take 81 mg by mouth daily. Yes Provider, Historical  
atorvastatin (LIPITOR) 80 mg tablet Take 40 mg by mouth nightly. Yes Provider, Historical  
docusate sodium (COLACE) 100 mg capsule Take 100 mg by mouth three (3) times daily as needed for Constipation. Yes Provider, Historical  
senna (SENOKOT) 8.6 mg tablet Take 2 Tabs by mouth nightly. Yes Provider, Historical  
psyllium (METAMUCIL) powd Take 1 Dose by mouth two (2) times a day. 2 teaspoons in liquid twice daily    Yes Provider, Historical  
diazePAM (VALIUM) 5 mg tablet Take 2.5 mg by mouth daily. Indications: anxious   Yes Provider, Historical  
cholecalciferol (VITAMIN D3) (1000 Units /25 mcg) tablet Take 2,000 Units by mouth daily. Yes Provider, Historical  
denosumab (PROLIA) 60 mg/mL injection 60 mg by SubCUTAneous route See Admin Instructions. One syringe subq every 6 months.   Due 3/13    Yes Provider, Historical  
ipratropium (ATROVENT) 0.02 % soln 0.5 mg by Nebulization route every four (4) hours as needed (shortness of breath). Yes Provider, Historical  
guaiFENesin (ORGANIDIN) 200 mg tablet Take 200 mg by mouth every six (6) hours as needed for Congestion. Yes Provider, Historical  
metoprolol tartrate (LOPRESSOR) 50 mg tablet Take 50 mg by mouth two (2) times a day. Patient takes in morning (0900) and afternoon (1600)   Yes Provider, Historical  
albuterol (PROVENTIL VENTOLIN) 2.5 mg /3 mL (0.083 %) nebu 2.5 mg by Nebulization route every six (6) hours as needed for Wheezing. Yes Provider, Historical  
budesonide-formoteroL (SYMBICORT) 160-4.5 mcg/actuation HFAA Take 2 Puffs by inhalation two (2) times a day. Yes Provider, Historical  
polyethylene glycol (MIRALAX) 17 gram packet Take 17 g by mouth daily as needed for Constipation. Yes Provider, Historical  
pantoprazole (PROTONIX) 40 mg tablet Take 40 mg by mouth daily. Yes Provider, Historical  
melatonin 5 mg tablet Take 10 mg by mouth nightly. Yes Provider, Historical  
prazosin (MINIPRESS) 2 mg capsule Take 4 mg by mouth nightly. Yes Provider, Historical  
ziprasidone (GEODON) 40 mg capsule Take 40 mg by mouth two (2) times daily (with meals). Yes Provider, Historical  
FLUoxetine (PROzac) 20 mg capsule Take 60 mg by mouth daily. Yes Provider, Historical  
pediatric multivitamins chewable tablet Take 1 Tab by mouth daily. Yes Provider, Historical  
brinzolamide-brimonidine 1-0.2 % drps Administer 1 Drop to both eyes three (3) times daily. Yes Provider, Historical  
diazePAM (VALIUM) 5 mg tablet Take 5 mg by mouth nightly. Indications: anxious   Yes Provider, Historical  
 
Allergies Allergen Reactions  Hydrocodone Nausea Only  Oxycodone Nausea Only  Percocet [Oxycodone-Acetaminophen] Nausea Only  Vicodin [Hydrocodone-Acetaminophen] Nausea Only Social History Tobacco Use  Smoking status: Not on file Substance Use Topics  Alcohol use: Not on file No family history on file. Current Facility-Administered Medications Medication Dose Route Frequency  dilTIAZem CD (CARDIZEM CD) capsule 180 mg  180 mg Oral DAILY  polyethylene glycol (MIRALAX) packet 17 g  17 g Oral BID  
 apixaban (ELIQUIS) tablet 5 mg  5 mg Oral Q12H  pantoprazole (PROTONIX) tablet 40 mg  40 mg Oral ACB  sodium chloride (NS) flush 5-40 mL  5-40 mL IntraVENous Q8H  
 nicotine (NICODERM CQ) 21 mg/24 hr patch 1 Patch  1 Patch TransDERmal DAILY  methylPREDNISolone (PF) (Solu-MEDROL) injection 60 mg  60 mg IntraVENous Q6H  
 senna (SENOKOT) tablet 17.2 mg  2 Tab Oral QHS  atorvastatin (LIPITOR) tablet 40 mg  40 mg Oral QHS  diazePAM (VALIUM) tablet 2.5 mg  2.5 mg Oral BID  dorzolamide (TRUSOPT) 2 % ophthalmic solution 1 Drop  1 Drop Both Eyes TID  ziprasidone (GEODON) capsule 40 mg  40 mg Oral BID WITH MEALS  
 brimonidine (ALPHAGAN) 0.2 % ophthalmic solution 1 Drop  1 Drop Both Eyes TID  budesonide (PULMICORT) 500 mcg/2 ml nebulizer suspension  500 mcg Nebulization BID RT  
 arformoteroL (BROVANA) neb solution 15 mcg  15 mcg Nebulization BID RT  
 melatonin tablet 9 mg  9 mg Oral QHS  prazosin (MINIPRESS) capsule 4 mg  4 mg Oral QHS  albuterol-ipratropium (DUO-NEB) 2.5 MG-0.5 MG/3 ML  3 mL Nebulization Q4H RT  
 guaiFENesin ER (MUCINEX) tablet 1,200 mg  1,200 mg Oral BID Objective:  
Vital Signs:   
Visit Vitals /65 (BP 1 Location: Right arm, BP Patient Position: At rest) Pulse (!) 108 Temp 97.8 °F (36.6 °C) Resp 21 Ht 5' 9\" (1.753 m) Wt 63.3 kg (139 lb 8 oz) SpO2 95% BMI 20.60 kg/m² O2 Device: BIPAP  
O2 Flow Rate (L/min): 50 l/min(titrated to 45) Temp (24hrs), Av.9 °F (36.6 °C), Min:97.4 °F (36.3 °C), Max:98.2 °F (36.8 °C) Intake/Output:  
Last shift:      701 - 1900 In: 350 [P.O.:350] Out: 500 [Urine:500] Last 3 shifts: 1901 - 700 In: 200 [P.O.:720; I.V.:50] Out: 4060 [[id number]] Intake/Output Summary (Last 24 hours) at 3/18/2020 0930 Last data filed at 3/18/2020 2750 Gross per 24 hour Intake 720 ml Output 3110 ml Net -2390 ml Physical Exam:  
General:  Awake, alert, on NIV, + resp distress Head:  NCAT Eyes:  EOMI, anicteric Nose: Nares clear, HF in place, septum midline Throat: Clear, MM Neck: Trachea midline,supple Lungs:   Diminished, bilateral wheeze, tachypnea, increased WOB Chest wall:  Normal shape, nontender Heart:  RRR Abdomen:   Soft, NT, ND, +bowel sounds Extremities: No edema Pulses: 2+ Skin: Dry, intact, tattoos Lymph nodes: No cervical lymphadenopathy Neurologic: No focal findings, oriented x 3 Data review:  
 
Recent Results (from the past 24 hour(s)) LACTIC ACID Collection Time: 03/18/20 12:46 AM  
Result Value Ref Range Lactic acid 2.4 (HH) 0.4 - 2.0 MMOL/L  
CBC WITH AUTOMATED DIFF Collection Time: 03/18/20 12:46 AM  
Result Value Ref Range WBC 7.7 4.1 - 11.1 K/uL  
 RBC 4.06 (L) 4.10 - 5.70 M/uL  
 HGB 13.1 12.1 - 17.0 g/dL HCT 39.4 36.6 - 50.3 % MCV 97.0 80.0 - 99.0 FL  
 MCH 32.3 26.0 - 34.0 PG  
 MCHC 33.2 30.0 - 36.5 g/dL  
 RDW 11.6 11.5 - 14.5 % PLATELET 186 480 - 646 K/uL MPV 9.1 8.9 - 12.9 FL  
 NRBC 0.0 0  WBC ABSOLUTE NRBC 0.00 0.00 - 0.01 K/uL NEUTROPHILS 86 (H) 32 - 75 % LYMPHOCYTES 4 (L) 12 - 49 % MONOCYTES 10 5 - 13 % EOSINOPHILS 0 0 - 7 % BASOPHILS 0 0 - 1 % IMMATURE GRANULOCYTES 1 (H) 0.0 - 0.5 % ABS. NEUTROPHILS 6.5 1.8 - 8.0 K/UL  
 ABS. LYMPHOCYTES 0.3 (L) 0.8 - 3.5 K/UL  
 ABS. MONOCYTES 0.8 0.0 - 1.0 K/UL  
 ABS. EOSINOPHILS 0.0 0.0 - 0.4 K/UL  
 ABS. BASOPHILS 0.0 0.0 - 0.1 K/UL  
 ABS. IMM. GRANS. 0.0 0.00 - 0.04 K/UL  
 DF AUTOMATED METABOLIC PANEL, COMPREHENSIVE Collection Time: 03/18/20 12:46 AM  
Result Value Ref Range Sodium 128 (L) 136 - 145 mmol/L Potassium 4.0 3.5 - 5.1 mmol/L  Chloride 84 (L) 97 - 108 mmol/L  
 CO2 41 (HH) 21 - 32 mmol/L Anion gap 3 (L) 5 - 15 mmol/L Glucose 170 (H) 65 - 100 mg/dL BUN 22 (H) 6 - 20 MG/DL Creatinine 0.92 0.70 - 1.30 MG/DL  
 BUN/Creatinine ratio 24 (H) 12 - 20 GFR est AA >60 >60 ml/min/1.73m2 GFR est non-AA >60 >60 ml/min/1.73m2 Calcium 8.9 8.5 - 10.1 MG/DL Bilirubin, total 0.5 0.2 - 1.0 MG/DL  
 ALT (SGPT) 38 12 - 78 U/L  
 AST (SGOT) 22 15 - 37 U/L Alk. phosphatase 60 45 - 117 U/L Protein, total 6.2 (L) 6.4 - 8.2 g/dL Albumin 3.4 (L) 3.5 - 5.0 g/dL Globulin 2.8 2.0 - 4.0 g/dL A-G Ratio 1.2 1.1 - 2.2 MAGNESIUM Collection Time: 03/18/20 12:46 AM  
Result Value Ref Range Magnesium 2.1 1.6 - 2.4 mg/dL PHOSPHORUS Collection Time: 03/18/20 12:46 AM  
Result Value Ref Range Phosphorus 3.0 2.6 - 4.7 MG/DL  
LACTIC ACID Collection Time: 03/18/20  6:19 AM  
Result Value Ref Range Lactic acid 2.7 (HH) 0.4 - 2.0 MMOL/L Imaging: 
I have personally reviewed the patients radiographs and have reviewed the reports: CXR 3/15/2020 - Hyperexpansion of the lungs is again shown. No consolidation or pulmonary edema is demonstrated. Bilateral blunting of the costophrenic sulci is unchanged. Cardiac and mediastinal contours are stable.    
 
  
Kevin Templeton, [id number]

## 2020-03-18 NOTE — PROGRESS NOTES
Palliative Medicine Consult [place]: [phone] ([phone]) Patient Name: Victoria Dao YOB: 1950 Date of Initial Consult: 3/13/2020 Reason for Consult: ES Disease Requesting Provider: Jaja SMILEY Primary Care Physician: None SUMMARY:  
Victoria Dao is a 81 y.o. with a past history of COPD/GOLD3 on O2 4L continuous, +active tobacco use, HLD, HTN, Anxiety who was admitted on 3/11/2020 from home with a diagnosis of Acute Hypercapnic Respiratory Failure. Current medical issues leading to Palliative Medicine involvement include: GOC discussion in setting of ES COPD. Patient presented to Er w/ cc of worsening SOB x 2 weeks, with associated wheezing, productive cough and pain. Patient also endorsed a cough secondary to cough as well as episode of diarrhea earlier day of admission. In ER patient observed to be thin, chronically ill appearing and in respiratory distress, he had + temp 101.6, labs w/ Na 129, creatinine and albumin WNL. Patient required 4L O2. Respiratory panel returned negative. Patient started on empiric Abx and was admitted. Course of hospitalization: O2 needs increased requiring bipap. 3/13 CXR mild interstitial pulmonary edema and small right pleural effusion, ABG abnormal, CO2 83. PALLIATIVE DIAGNOSES:  
1. Advanced care planning discussion 2. Goals of care discussion 3. DNR discussion 4. Shortness of breath 5. Anxiety PLAN:  
1. Prior to visit completed chart review for updated information since initial consultation. 2. Spoke with patients nurse Yumi Roque RN and attending Dr. John Stark 3. I met with the patient, no family at bedside. I reintroduced myself and role of palliative medicine. Assessed his understanding of hospitalization. Patient seems to have limited insight to his current condition, Advanced COPD and increased O2 needs. 4. Advanced care planning discussion- No AMD in EMR.  Patient  x2, he has 2 adult daughters, Florida Enriquez (lives locally) and Ariane Bernardo (lives in [facility]). Patient stated that he recently completed AMD at the [place], in which he designated his daughter Pretty Dinero to be his primary health care decision maker. · Recommend Case management attempt to get copy of AMD from [place] · Patient stated that he wants full restorative treatments and interventions, including CPR and intubation with ventilator support, however, patient stated that he would NOT want to tracheostomy, does NOT want to live in a nursing home. Patient indicated that if he gets to the point that he needs full care of a nursing home, at that time he would not want full restorative. 5. Goals of care discussion- Full restorative, wants to be discharged home with home health. Explored possibility of needing more assistance than he has needed in past, he stated would be agreeable to home health and PT/OT. Attempted to explore option of comfort if unable to wean off high flow and biap, he did not want to discuss. · I called patients daughter Florida Enriquez, left voice message requesting call back. 6. DNR discussion- patient reiterated that he would want CPR and intubation if needed, Patient reiterated Full Code status. 7. Shortness of breath- etiology includes advanced COPD, +pulmonary HTN, +active tobacco use prior to hospitalization. Patient currently requiring hi flow O2 or BiPAP, very slow to improve. Attempted to explore options with patient should he not be able to be weaned to baseline O2 needs, however patient unable/unwilling to discuss, didn't seem to full comprehend it was a possibility of not being able to be weaned, that he may die from COPD, though he did tell me that a family member  from [address] within past couple of years. 8. Anxiety- Long hx of anxiety, has had multiple adjustments to his regimen. Increased anxiety related to health, sob and decline. Patients current regimen diazepam 2.5mg orally twice daily' 9.  Initial consult note routed to primary continuity provider and/or primary health care team members 10. Communicated plan of care with: Palliative [facility] Team, unit nurse Arnoldo Dao RN 
 
 GOALS OF CARE / TREATMENT PREFERENCES:  
 
GOALS OF CARE: continue with full restorative tx and interventions at this time Patient/Health Care Proxy Stated Goals: Prolong life TREATMENT PREFERENCES:  
Code Status: Full Code Advance Care Planning: 
[x] [facility] Interdisciplinary Team has updated the ACP Navigator with Milagro and Patient Capacity Advance Care Planning 3/14/2020 Patient's Healthcare Decision Maker is: Legal Next of Kin Confirm Advance Directive None Patient Would Like to Complete Advance Directive Unable Medical Interventions: Full interventions Other Instructions: Other: As far as possible, the palliative care team has discussed with patient / health care proxy about goals of care / treatment preferences for patient. HISTORY:  
 
History obtained from: medical records/nurse/patient CHIEF COMPLAINT: want to go home HPI/SUBJECTIVE: The patient is:  
[x] Verbal and participatory [x] Non-participatory due to:  
Patient stated that he wants to go home, stated breathing is a little better, has been using bipap and high flow. Patient acknowledged anxiety, stated that he wished to be on old regimen of diazapam 2.5mg every morning and 5mg every night. Clinical Pain Assessment (nonverbal scale for severity on nonverbal patients):  
Clinical Pain Assessment Severity: 0 Location: denied pain Character: denied pain Duration: denied pain Effect: denied pain Factors: denied pain Frequency: denied pain Activity (Movement): Lying quietly, normal position Duration: for how long has pt been experiencing pain (e.g., 2 days, 1 month, years) Frequency: how often pain is an issue (e.g., several times per day, once every few days, constant) FUNCTIONAL ASSESSMENT:  
 
Palliative Performance Scale (PPS): PPS: 40 PSYCHOSOCIAL/SPIRITUAL SCREENING:  
 
Palliative IDT has assessed this patient for cultural preferences / practices and a referral made as appropriate to needs (Cultural Services, Patient Advocacy, Ethics, etc.) Any spiritual / Advent concerns: 
[] Yes /  [x] No 
 
Caregiver Burnout: 
[] Yes /  [] No /  [x] No Caregiver Present Anticipatory grief assessment:  
[x] Normal  / [] Maladaptive ESAS Anxiety: Anxiety: 4 ESAS Depression:    
 
 
 REVIEW OF SYSTEMS:  
 
Positive and pertinent negative findings in ROS are noted above in HPI. The following systems were [x] reviewed / [] unable to be reviewed as noted in HPI Other findings are noted below. Systems: constitutional, ears/nose/mouth/throat, respiratory, gastrointestinal, genitourinary, musculoskeletal, integumentary, neurologic, psychiatric, endocrine. Positive findings noted below. Modified ESAS Completed by: provider Fatigue: 5 Drowsiness: 0 Pain: 0 Anxiety: 4 Anorexia: 5 Dyspnea: 5 Stool Occurrence(s): 1 PHYSICAL EXAM:  
 
From RN flowsheet: 
Wt Readings from Last 3 Encounters:  
03/18/20 139 lb 8 oz (63.3 kg) Blood pressure 140/68, pulse 98, temperature 98.4 °F (36.9 °C), resp. rate 17, height 5' 9\" (1.753 m), weight 139 lb 8 oz (63.3 kg), SpO2 94 %. Pain Scale 1: Numeric (0 - 10) Pain Intensity 1: 0 Pain Onset 1: chronic Pain Location 1: Back Pain Orientation 1: Posterior Pain Description 1: Aching Pain Intervention(s) 1: Medication (see MAR) Last bowel movement, if known:  
 
Constitutional: thin, frail, chronically ill appearing Eyes: pupils equal, anicteric ENMT: bipap Cardiovascular: regular rhythm, distal pulses intact Respiratory: mild labor as seen by accessory muscle use Gastrointestinal: soft non-tender, +bowel sounds Musculoskeletal: no deformity, no tenderness to palpation Skin: warm, dry Neurologic: fatigued,, able to following commands, weak, moving all extremities Psychiatric:unable denies, appropriate affect HISTORY:  
 
Active Problems: 
  Acute on chronic respiratory failure with hypoxia ([address] (3/11/2020) No past medical history on file. No past surgical history on file. No family history on file. History reviewed, no pertinent family history. Social History Tobacco Use  Smoking status: Not on file Substance Use Topics  Alcohol use: Not on file Allergies Allergen Reactions  Hydrocodone Nausea Only  Oxycodone Nausea Only  Percocet [Oxycodone-Acetaminophen] Nausea Only  Vicodin [Hydrocodone-Acetaminophen] Nausea Only Current Facility-Administered Medications Medication Dose Route Frequency  methylPREDNISolone (PF) (Solu-MEDROL) injection 80 mg  80 mg IntraVENous Q6H  
 [START ON 3/19/2020] dilTIAZem CD (CARDIZEM CD) capsule 300 mg  300 mg Oral DAILY  polyethylene glycol (MIRALAX) packet 17 g  17 g Oral BID  
 apixaban (ELIQUIS) tablet 5 mg  5 mg Oral Q12H  pantoprazole (PROTONIX) tablet 40 mg  40 mg Oral ACB  sodium chloride (NS) flush 5-40 mL  5-40 mL IntraVENous Q8H  
 sodium chloride (NS) flush 5-40 mL  5-40 mL IntraVENous PRN  
 ondansetron (ZOFRAN) injection 4 mg  4 mg IntraVENous Q4H PRN  
 nicotine (NICODERM CQ) 21 mg/24 hr patch 1 Patch  1 Patch TransDERmal DAILY  senna (SENOKOT) tablet 17.2 mg  2 Tab Oral QHS  docusate sodium (COLACE) capsule 100 mg  100 mg Oral TID PRN  
 atorvastatin (LIPITOR) tablet 40 mg  40 mg Oral QHS  acetaminophen (TYLENOL) tablet 650 mg  650 mg Oral Q6H PRN  
 diazePAM (VALIUM) tablet 2.5 mg  2.5 mg Oral BID  dorzolamide (TRUSOPT) 2 % ophthalmic solution 1 Drop  1 Drop Both Eyes TID  ziprasidone (GEODON) capsule 40 mg  40 mg Oral BID WITH MEALS  
  brimonidine (ALPHAGAN) 0.2 % ophthalmic solution 1 Drop  1 Drop Both Eyes TID  budesonide (PULMICORT) 500 mcg/2 ml nebulizer suspension  500 mcg Nebulization BID RT  
 arformoteroL (BROVANA) neb solution 15 mcg  15 mcg Nebulization BID RT  
 melatonin tablet 9 mg  9 mg Oral QHS  prazosin (MINIPRESS) capsule 4 mg  4 mg Oral QHS  albuterol-ipratropium (DUO-NEB) 2.5 MG-0.5 MG/3 ML  3 mL Nebulization Q4H RT  
 guaiFENesin ER (MUCINEX) tablet 1,200 mg  1,200 mg Oral BID  sodium chloride (NS) flush 5-10 mL  5-10 mL IntraVENous PRN  
 
 
 
 LAB AND IMAGING FINDINGS:  
 
Lab Results Component Value Date/Time WBC 7.7 03/18/2020 12:46 AM  
 HGB 13.1 03/18/2020 12:46 AM  
 PLATELET 276 13/82/6979 12:46 AM  
 
Lab Results Component Value Date/Time Sodium 131 (L) 03/18/2020 12:08 PM  
 Potassium 4.3 03/18/2020 12:08 PM  
 Chloride 89 (L) 03/18/2020 12:08 PM  
 CO2 41 (HH) 03/18/2020 12:08 PM  
 BUN 19 03/18/2020 12:08 PM  
 Creatinine 0.78 03/18/2020 12:08 PM  
 Calcium 8.4 (L) 03/18/2020 12:08 PM  
 Magnesium 2.1 03/18/2020 12:46 AM  
 Phosphorus 2.8 03/18/2020 12:08 PM  
  
Lab Results Component Value Date/Time AST (SGOT) 22 03/18/2020 12:46 AM  
 Alk. phosphatase 60 03/18/2020 12:46 AM  
 Protein, total 6.2 (L) 03/18/2020 12:46 AM  
 Albumin 3.3 (L) 03/18/2020 12:08 PM  
 Globulin 2.8 03/18/2020 12:46 AM  
 
No results found for: INR, PTMR, PTP, PT1, PT2, APTT, INREXT, INREXT No results found for: IRON, FE, TIBC, IBCT, PSAT, FERR Lab Results Component Value Date/Time pH 7.46 (H) 03/18/2020 10:10 AM  
 PCO2 56 (H) 03/18/2020 10:10 AM  
 PO2 81 03/18/2020 10:10 AM  
 
No components found for: [place] No results found for: CPK, CKMB Total time: 35 min Counseling / coordination time, spent as noted above: 25 min 
> 50% counseling / coordination?: yes Prolonged service was provided for  []30 min   []75 min in face to face time in the presence of the patient, spent as noted above. Time Start:  
Time End:  
Note: this can only be billed with 83220 (initial) or 84564 (follow up). If multiple start / stop times, list each separately.

## 2020-03-18 NOTE — PROGRESS NOTES
Luis Espinosa [facility] 
[address] [facility], [place], [address] [place] [address] 
[phone] Medical Progress Note NAME: Quyen Goldsmith :  1950 MRM:  [id number] Date/Time: 3/18/2020 Assessment / Plan:  
 
Acute on chronic respiratory failure with hypoxia: 2/2 COPD exacerbation. No PE on CTA chest. No PNA on imaging. Very slowly to improve. Worse overnight and placed on BiPAP again. PCCM following. Flu and RVP negative. CXR repeated 3/18, no PNA. Completed doxy, duo-nebs, IV steroids, ICS/LABA. Poor overall prognosis. Palliative care consult A-fib: new onset, converted to sinus after IV diltiazem, transitioned to PO. Continue Eliquis SIRS / Elevated lactic acid: resolved. No source of infection. Monitor clinically. No fever since admission 
  
Hyponatremia: worse. With worsening alkalosis. Possible SIADH. Stop diuretics. HOLD SSRI  
  
Anxiety: was severe, now stable. Continue Prozac, Valium, Geodon 
  
HTN: stable. Cont diltiazem HLD: cont statin 
  
 
Total time spent: 35 minutes, d/w PCCM Time spent in the care of this patient including reviewing records, discussing with nursing and/or other providers on the treatment team, obtaining history and examining the patient, and discussing treatment plans. Care Plan discussed with: Patient, Nursing Staff and >50% of time spent in counseling and coordination of care Discussed:  Care Plan and D/C Planning Prophylaxis:  Lovenox Disposition:  Home w/Family Subjective: Chief Complaint:  Follow up COPD Chart/notes/labs/studies reviewed, patient examined at bedside. Worsening dyspnea. No CP. No fevers. Objective:  
 
 
Vitals:  
 
  
Last 24hrs VS reviewed since prior progress note. Most recent are: 
 
Visit Vitals /48 (BP 1 Location: Right arm, BP Patient Position: Head of bed elevated (Comment degrees)) Pulse 90 Temp 98.2 °F (36.8 °C) Resp 19  
 Ht 5' 9\" (1.753 m) Wt 63.3 kg (139 lb 8 oz) SpO2 95% BMI 20.60 kg/m² SpO2 Readings from Last 6 Encounters:  
03/18/20 95% O2 Flow Rate (L/min): 60 l/min Intake/Output Summary (Last 24 hours) at 3/18/2020 1718 Last data filed at 3/18/2020 1414 Gross per 24 hour Intake 670 ml Output 1750 ml Net -1080 ml Exam:  
 
Physical Exam: 
 
Gen: disheveled, frail, chronically ill-appearing HEENT:  Sclerae nonicteric, hearing intact to voice, mucous membranes moist 
Neck:  Supple, without masses. Resp:  No accessory muscle use with increased WOB. Poor air movement. Exp wheezing. No rales or rhonchi 
Card: RRR, without m/r/g. No LE edema. Abd:  +bowel sounds, soft, NTTP, nondistended. No HSM. Neuro: Face symmetric, tongue midline, speech fluent, follows commands appropriately Psych:  Alert, oriented x 3. Limited insight Medications Reviewed: (see below) Lab Data Reviewed: (see below) 
 
______________________________________________________________________ Medications:  
 
Current Facility-Administered Medications Medication Dose Route Frequency  methylPREDNISolone (PF) (Solu-MEDROL) injection 80 mg  80 mg IntraVENous Q6H  
 [START ON 3/19/2020] dilTIAZem CD (CARDIZEM CD) capsule 300 mg  300 mg Oral DAILY  polyethylene glycol (MIRALAX) packet 17 g  17 g Oral BID  
 apixaban (ELIQUIS) tablet 5 mg  5 mg Oral Q12H  pantoprazole (PROTONIX) tablet 40 mg  40 mg Oral ACB  sodium chloride (NS) flush 5-40 mL  5-40 mL IntraVENous Q8H  
 sodium chloride (NS) flush 5-40 mL  5-40 mL IntraVENous PRN  
 ondansetron (ZOFRAN) injection 4 mg  4 mg IntraVENous Q4H PRN  
 nicotine (NICODERM CQ) 21 mg/24 hr patch 1 Patch  1 Patch TransDERmal DAILY  senna (SENOKOT) tablet 17.2 mg  2 Tab Oral QHS  docusate sodium (COLACE) capsule 100 mg  100 mg Oral TID PRN  
 atorvastatin (LIPITOR) tablet 40 mg  40 mg Oral QHS  acetaminophen (TYLENOL) tablet 650 mg  650 mg Oral Q6H PRN  
  diazePAM (VALIUM) tablet 2.5 mg  2.5 mg Oral BID  dorzolamide (TRUSOPT) 2 % ophthalmic solution 1 Drop  1 Drop Both Eyes TID  ziprasidone (GEODON) capsule 40 mg  40 mg Oral BID WITH MEALS  
 brimonidine (ALPHAGAN) 0.2 % ophthalmic solution 1 Drop  1 Drop Both Eyes TID  budesonide (PULMICORT) 500 mcg/2 ml nebulizer suspension  500 mcg Nebulization BID RT  
 arformoteroL (BROVANA) neb solution 15 mcg  15 mcg Nebulization BID RT  
 melatonin tablet 9 mg  9 mg Oral QHS  prazosin (MINIPRESS) capsule 4 mg  4 mg Oral QHS  albuterol-ipratropium (DUO-NEB) 2.5 MG-0.5 MG/3 ML  3 mL Nebulization Q4H RT  
 guaiFENesin ER (MUCINEX) tablet 1,200 mg  1,200 mg Oral BID  sodium chloride (NS) flush 5-10 mL  5-10 mL IntraVENous PRN Lab Review:  
 
Recent Labs  
  03/18/20 
0046 03/17/20 
0122 03/16/20 
0156 WBC 7.7 6.6 5.2 HGB 13.1 12.8 13.5 HCT 39.4 37.6 39.9  172 171 Recent Labs  
  03/18/20 
1208 03/18/20 
5045 03/17/20 
0122 03/16/20 
0957 * 128* 130* 131* K 4.3 4.0 3.7 3.7 CL 89* 84* 88* 89* CO2 41* 41* 40* 38* * 170* 154* 179* BUN 19 22* 22* 19  
CREA 0.78 0.92 0.90 0.88 CA 8.4* 8.9 8.5 8.6 MG  --  2.1 1.6 1.8 PHOS 2.8 3.0 3.5 2.7 ALB 3.3* 3.4* 3.2* 3.6 SGOT  --  22  --   --   
ALT  --  38  --   -- No components found for: [place] Recent Labs  
  03/18/20 
1010 PH 7.46* PCO2 56* PO2 81 HCO3 38* FIO2 60 No results for input(s): INR, INREXT, INREXT in the last 72 hours. No results found for: SDES Lab Results Component Value Date/Time Culture result: NO GROWTH 6 DAYS 03/11/2020 10:24 PM  
 Culture result: NO GROWTH 6 DAYS 03/11/2020 10:09 PM  
 
        
___________________________________________________ Attending Physician: Darell Chavez MD

## 2020-03-18 NOTE — ROUTINE PROCESS
0206 
Received call from Mary Jo Post in lab for critical CO2 41. Also has a critical lactic of 2.4. Dr. Osorio Desouza made aware. Advised to recheck in 4 hours. 0180 After weighing patient and removed bipap for medication patient heart rate went into the 170's. Patient states his heart does feel like it is racing. Heart rate fluctuates. Went as low as 100 and appeared to be NSR. Heart rate went back up again so EKG was done. Showed A Fib RVR. Rate currently 115. Notified Sherron. Advised to give 0900 cardizem now. 
 
3285 Notified Dr. French Avina of critical lactic of 2.7. Advised to hold diuretics this morning. 9361 Bedside and Verbal shift change report given to Angie Weeks (oncoming nurse) by Nita Corea (offgoing nurse). Report included the following information SBAR, Kardex, ED Summary, Procedure Summary, Intake/Output, MAR, Recent Results and Cardiac Rhythm A Fib.

## 2020-03-18 NOTE — PROGRESS NOTES
Bedside and Verbal shift change report given to A Hal RN (oncoming nurse) by Tyrone Suh RN (offgoing nurse). Report included the following information SBAR, Kardex, ED Summary, Intake/Output and MAR.  
 
0848- Attempted to titrate Hi Flow per RT, sats sustaining upper 70s, placed back on BiPAP 
 
0930- Dasha RN assuming care for patient. Unable to complete full assessment prior to handoff. O2 sats sustaining mid 90s on BiPAP.

## 2020-03-18 NOTE — PROGRESS NOTES
Nutrition Assessment: 
 
RECOMMENDATIONS/INTERVENTION(S):  
1. Continue with Regular diet order to promote PO intakes. 2. Will order Ensure Enlive daily for additional kcal intake. Will continue to monitor PO intakes, weight, labs. ASSESSMENT:  
3/18: Pt has remained on Regular diet with PO intakes documented as % all meals. Pt confirms good appetite and intakes. Worsening respiratory function, has been off and on Bipap. Labs: Na+ 128, . Meds: statin, Solumedrol, Miralax. Skin intact. 3/12: 82 yo male admitted for acute on chronic respiratory failure. RD assessment for low BMI screen. PMhx: COPD, HTN, HLD, on home O2. Underweight per BMI per age. No weight hx in EMR to assess change, pt denies recent weight change. Regular diet ordered - intakes documented as 75-90% meals. Pt confirms good appetite here and PTA. Denies difficulty chewing/swallowing. Labs: , Na+ 129. Meds: statin, MgSu, Solumedrol. NaCl running at 125ml/hr. Skin intact. Diet Order: Regular 
% Eaten:   
Patient Vitals for the past 72 hrs: 
 % Diet Eaten 03/18/20 0846 75 % 03/16/20 1744 100 % 03/16/20 1014 80 % 03/15/20 1524 100 % 03/15/20 1244 75 % Pertinent Medications: [x] Reviewed Labs: [x] Reviewed Anthropometrics: Height: 5' 9\" (175.3 cm) Weight: 63.3 kg (139 lb 8 oz) IBW (%IBW):   ( ) UBW (%UBW):   (  %) BMI: Body mass index is 20.6 kg/m². This BMI is indicative of: 
 [x] Underweight  - per age  [] Normal    [] Overweight    []  Obesity    []  Extreme Obesity (BMI>40) Estimated Nutrition Needs (Based on): 2130 Kcals/day(REE 1446 x AF 1.3 + 250) , 68 g(68-82gm (1-1.2gm/kg/d)) Protein Carbohydrate: At Least 130 g/day  Fluids: 2130 mL/day (1 ml/kcal) Last BM: 3/16   [x]Active     []Hyperactive  []Hypoactive       [] Absent   BS Skin:    [x] Intact   [] Incision  [] Breakdown   [] DTI   [] Tears/Excoriation/Abrasion  []Edema [] Other: Wt Readings from Last 30 Encounters:  
03/18/20 63.3 kg (139 lb 8 oz) NUTRITION DIAGNOSES:  
Problem:  Underweight Etiology: related to decreased ability to consume sufficient energy to maintain appropriate weight Signs/Symptoms: as evidenced by BMI 22 (age > 72 years) 3/18: Nutrition Dx continues/worsened - BMI 20.6 NUTRITION INTERVENTIONS: 
Meals/Snacks: General/healthful diet   Supplements: Commercial supplement GOAL:  
Consume > 75% all meals + ONS to promote weight gain of 0.5lb within next 4-6 days Cultural, Islam, or Ethnic Dietary Needs: None EDUCATION & DISCHARGE NEEDS:  
 [x] None Identified 
 [] Identified and Education Provided/Documented 
 [] Identified and Pt declined/was not appropriate [x] Interdisciplinary Care Plan Reviewed/Documented  
 [x] Discharge Needs:   General healthy diet 
 [] No Nutrition Related Discharge Needs NUTRITION RISK:  
Pt Is At Nutrition Risk  [x] No Nutrition Risk Identified  [] PT SEEN FOR:  
 []  MD Consult: []Calorie Count []Diabetic Diet Education []Diet Education []Electrolyte Management []General Nutrition Management and Supplements []Management of Tube Feeding []TPN Recommendations []  RN Referral:  []MST score >=2 
   []Enteral/Parenteral Nutrition PTA []Pregnant: Gestational DM or Multigestation  
              [] Pressure Ulcer 
 
[]  Low BMI      []  Length of Stay       [] Dysphagia Diet         [] Ventilator [x]  Follow-up Previous Recommendations: 
 [x] Implemented          [] Not Implemented          [] Not Applicable Previous Goal: 
 [] Met              [x] Progressing Towards Goal              [] Not Progressing Towards Goal   [] Not Applicable Dariel Ardon RD Pager [phone] Phone [phone]

## 2020-03-18 NOTE — PROGRESS NOTES
Cardiology Progress Note [address] [facility], [place], [address] [facility] Phone [phone]; Fax [phone] 
 
 
 
3/18/2020 9:27 AM  
 
Admit Date:           3/11/2020 Admit Diagnosis:  Acute on chronic respiratory failure with hypoxia ([address] [J96.21] :          1950 MRN:          [id number] ASSESSMENT/RECOMMENDATION:  
1. Paroxysmal Atrial fibrillation: back in a fib RVR early this am. Will give po diltiazem now. If no improvement in rate then will change to IV. Cont eliquis.  of rate/rhythm is lung dse.  
-echo shows pEF 2. Acute on chronic hypercapneic/hypoxemic respiratory failure: ( 4 liters oxygen at home)  
- on Bi Pap 
-IV diuretics per pulmonary  
- creatinine stable 0.92. Negative fluid balance 3. CAD/Chest pain: neg troponin/ non ischemia ECG. None further. ASA stopped due to eliquis. Cont statin. - he denies history of PCI with a cath being done 2 years ago. Follows at [facility] 4. COPD 5. Hyponatremia: ? SIADH 6. Anxiety:  on multiple medications 7. Tobacco abuse: cessation reviewed CARDIOLOGY ATTENDING Patient personally seen and examined. All the elements of history and examination were personally performed. Assessment and plan was discussed and agree as written above Has been in and out of afib and is now back in sinus. Will increase Cardizem to 300 mg daily for tighter HR control. Hrt - RRR; lungs with ronchi Continue rate control and  Continue OAC. I will sign off, but please call with any questions. Gildardo Denver, MD, [facility]  
  
  
 
 
 
 
 
 1901 -  0700 In: 120 [P.O.:120] Out: 1000 [Urine:1000] Last 3 Recorded Weights in this Encounter 03/15/20 1053 20 0544 20 Weight: 140 lb 6.9 oz (63.7 kg) 139 lb 11.2 oz (63.4 kg) 139 lb 8 oz (63.3 kg) 03/16 0701 - 03/17 1900 In: 6786 [P.O.:1620; I.V.:50] Out: 8612 [Urine:6210] SUBJECTIVE Candice Felder with inc SOB, felt palpitations earlier this am. 
 
 
 
 
Current Facility-Administered Medications Medication Dose Route Frequency  dilTIAZem CD (CARDIZEM CD) capsule 180 mg  180 mg Oral DAILY  polyethylene glycol (MIRALAX) packet 17 g  17 g Oral BID  
 apixaban (ELIQUIS) tablet 5 mg  5 mg Oral Q12H  
 bumetanide (BUMEX) injection 1 mg  1 mg IntraVENous DAILY  pantoprazole (PROTONIX) tablet 40 mg  40 mg Oral ACB  sodium chloride (NS) flush 5-40 mL  5-40 mL IntraVENous Q8H  
 sodium chloride (NS) flush 5-40 mL  5-40 mL IntraVENous PRN  
 ondansetron (ZOFRAN) injection 4 mg  4 mg IntraVENous Q4H PRN  
 nicotine (NICODERM CQ) 21 mg/24 hr patch 1 Patch  1 Patch TransDERmal DAILY  methylPREDNISolone (PF) (Solu-MEDROL) injection 60 mg  60 mg IntraVENous Q6H  
 senna (SENOKOT) tablet 17.2 mg  2 Tab Oral QHS  FLUoxetine (PROzac) capsule 60 mg  60 mg Oral DAILY  docusate sodium (COLACE) capsule 100 mg  100 mg Oral TID PRN  
 atorvastatin (LIPITOR) tablet 40 mg  40 mg Oral QHS  acetaminophen (TYLENOL) tablet 650 mg  650 mg Oral Q6H PRN  
 diazePAM (VALIUM) tablet 2.5 mg  2.5 mg Oral BID  dorzolamide (TRUSOPT) 2 % ophthalmic solution 1 Drop  1 Drop Both Eyes TID  ziprasidone (GEODON) capsule 40 mg  40 mg Oral BID WITH MEALS  
 brimonidine (ALPHAGAN) 0.2 % ophthalmic solution 1 Drop  1 Drop Both Eyes TID  budesonide (PULMICORT) 500 mcg/2 ml nebulizer suspension  500 mcg Nebulization BID RT  
 arformoteroL (BROVANA) neb solution 15 mcg  15 mcg Nebulization BID RT  
 melatonin tablet 9 mg  9 mg Oral QHS  prazosin (MINIPRESS) capsule 4 mg  4 mg Oral QHS  albuterol-ipratropium (DUO-NEB) 2.5 MG-0.5 MG/3 ML  3 mL Nebulization Q4H RT  
 guaiFENesin ER (MUCINEX) tablet 1,200 mg  1,200 mg Oral BID  
  sodium chloride (NS) flush 5-10 mL  5-10 mL IntraVENous PRN OBJECTIVE Intake/Output Summary (Last 24 hours) at 3/18/2020 6389 Last data filed at 3/18/2020 1592 Gross per 24 hour Intake 170 ml Output 2610 ml Net -2440 ml Review of Systems - History obtained from the patient AS PER  HPI Telemetry: a fib RVR PHYSICAL EXAM  
  
 
Visit Vitals /86 Pulse (!) 150 Temp 97.9 °F (36.6 °C) Resp 18 Ht 5' 9\" (1.753 m) Wt 139 lb 8 oz (63.3 kg) SpO2 99% BMI 20.60 kg/m² Gen: Thin/frail in mod distress HEENT:  Pink conjunctivae, Hearing grossly normal.  Oral mucosa moist.  
Neck: Supple,  No JVD Resp: + accessory muscle use, poor air movement/distant breath sounds Card: Tachycardic, irregular rhythm. No murmur. GI:       BS+ ,    soft, non-tender MSK: No cyanosis or clubbing, good capillary refill Skin: No rashes or ulcers, no bruising. Tattoos BUE and BLE Neuro:  moving all four extremities, no focal deficit, follows commands appropriately Psych:  Good insight, oriented to person, place and time, alert, Nml Affect LE: No edema DATA REVIEW Laboratory and Imaging have been reviewed by me and are notable for No results for input(s): CPK, CKMB, TROIQ in the last 72 hours. Recent Labs  
  03/18/20 
0046 03/17/20 
0122 03/16/20 
0156 * 130* 131* K 4.0 3.7 3.7 CO2 41* 40* 38* BUN 22* 22* 19  
CREA 0.92 0.90 0.88 * 154* 179* PHOS 3.0 3.5 2.7 MG 2.1 1.6 1.8 WBC 7.7 6.6 5.2 HGB 13.1 12.8 13.5 HCT 39.4 37.6 39.9  172 171 Kira Kathleen NP

## 2020-03-18 NOTE — PROGRESS NOTES
Transition of Care 
RUR 18% Continues to require medical mangement for resp failure with hypoxia. Medical update sent to [facility] and spoke with Paola Srinivasan. [phone])  They have a new Pulmonary Program that is enhanced with more front loaded nursing visits multiple times in the first couple of weeks they are home. Sent updated physician notes through [employer] 1-CM to follow pt for discharge needs 2-followed at [place] in [facility] 3-Resume [facility] at discharge for SN, PT Ot and will need orders 4- family will transport home at discharge at this time.

## 2020-03-18 NOTE — PROGRESS NOTES
0959- bedside report. 1000-Passing meds. Resp. In room for ABG. 1123-patient sleeping in bed. 1230- pt sleeping in bed. 1315- Helped patient back in bed. Gave zofran for Nausea. 1500- RT transition patient to HiFlo 1615- Help patient back to bed from bedside commode. 1700- Paged pul. Due to NPO. Stated effective Midnight but no current order for food. Per Pul. NPO now due to risk of aspiration. 1720- Passed Meds. Explained NPO status and reasoning. 1830- Pt resting in bed.

## 2020-03-18 NOTE — PROGRESS NOTES
Palliative Medicine Consult [place]: [phone] ([phone]) Patient Name: Jennifer Ramirez YOB: 1950 Date of Initial Consult: 3/13/2020 Reason for Consult: ES Disease Requesting Provider: Wicho SMILEY Primary Care Physician: None SUMMARY:  
Jennifer Ramirez is a 81 y.o. with a past history of COPD/GOLD3 on O2 4L continuous, +active tobacco use, HLD, HTN, Anxiety who was admitted on 3/11/2020 from home with a diagnosis of Acute Hypercapnic Respiratory Failure. Current medical issues leading to Palliative Medicine involvement include: GOC discussion in setting of ES COPD. Patient presented to Er w/ cc of worsening SOB x 2 weeks, with associated wheezing, productive cough and pain. Patient also endorsed a cough secondary to cough as well as episode of diarrhea earlier day of admission. In ER patient observed to be thin, chronically ill appearing and in respiratory distress, he had + temp 101.6, labs w/ Na 129, creatinine and albumin WNL. Patient required 4L O2. Respiratory panel returned negative. Patient started on empiric Abx and was admitted. Course of hospitalization: O2 needs increased requiring bipap. 3/13 CXR mild interstitial pulmonary edema and small right pleural effusion, ABG abnormal, CO2 83. Soon after admission, developed  Intermittent Afib with RVR, cardiology following, requiring active adjustments to cardiac meds. PALLIATIVE DIAGNOSES:  
1. Advanced care planning discussion 2. Goals of care discussion 3. DNR discussion 4. Shortness of breath 5. Anxiety PLAN:  
1. Prior to visit completed chart review for updated information since initial consultation. I received message from palliative office  that patients daughter Kenya Stevens returned my call this morning. I did call back, no answer, left voice message. 2. I met with the patient, no family at bedside. Patient stated not feeling very good today, sob and anxious. 3. Goals of care discussion- gently revisited patients  precarious health, that COPD  is a progressive disease, that he is at high risk for decline · Patient continues to want full restorative treatments and interventions. He is hopeful that he will be able to return home. 4. DNR discussion- patient reiterated that he would want CPR and intubation if needed, Patient reiterated Full Code status. 5. Shortness of breath- etiology includes advanced COPD, +pulmonary HTN, +active tobacco use prior to hospitalization. Patient currently requiring combination of increased  hi flow 50 (from 40L)  And  BiPAP, due to  increased symptom. 6. Anxiety- Long hx of anxiety, has had multiple adjustments to his regimen. Increased anxiety related to health, sob and decline. Patients current regimen diazepam 2.5mg orally twice daily' 7. Initial consult note routed to primary continuity provider and/or primary health care team members 8. Communicated plan of care with: Palliative [facility] Team, GOALS OF CARE / TREATMENT PREFERENCES:  
 
GOALS OF CARE: continue with full restorative tx and interventions at this time Patient/Health Care Proxy Stated Goals: Prolong life TREATMENT PREFERENCES:  
Code Status: Full Code Advance Care Planning: 
[x] The [facility] Interdisciplinary Team has updated the ACP Navigator with [address] and Patient Capacity Advance Care Planning 3/14/2020 Patient's Healthcare Decision Maker is: Legal Next of Kin Confirm Advance Directive None Patient Would Like to Complete Advance Directive Unable Medical Interventions: Full interventions Other Instructions: Other: As far as possible, the palliative care team has discussed with patient / health care proxy about goals of care / treatment preferences for patient. HISTORY:  
 
History obtained from: medical records/nurse/patient CHIEF COMPLAINT: still wants to go home HPI/SUBJECTIVE: The patient is:  
[x] Verbal and participatory [] Non-participatory due to:  
+sob, + anxiety 3/13- BiPAP continuously 3/17- requiring combination of  High Flow 50L  and BiPAP (mostly at night) 3/18- Slight increased O2 needs, using BiPAP more today, Hi flow 60 l/min Clinical Pain Assessment (nonverbal scale for severity on nonverbal patients):  
Clinical Pain Assessment Severity: 0 Location: denied pain Character: denied pain Duration: denied pain Effect: denied pain Factors: denied pain Frequency: denied pain Activity (Movement): Lying quietly, normal position Duration: for how long has pt been experiencing pain (e.g., 2 days, 1 month, years) Frequency: how often pain is an issue (e.g., several times per day, once every few days, constant) FUNCTIONAL ASSESSMENT:  
 
Palliative Performance Scale (PPS): PPS: 40 PSYCHOSOCIAL/SPIRITUAL SCREENING:  
 
Palliative IDT has assessed this patient for cultural preferences / practices and a referral made as appropriate to needs (Cultural Services, Patient Advocacy, Ethics, etc.) Any spiritual / Uatsdin concerns: 
[] Yes /  [x] No 
 
Caregiver Burnout: 
[] Yes /  [] No /  [x] No Caregiver Present Anticipatory grief assessment:  
[x] Normal  / [] Maladaptive ESAS Anxiety: Anxiety: 4 ESAS Depression:    
 
 
 REVIEW OF SYSTEMS:  
 
Positive and pertinent negative findings in ROS are noted above in HPI. The following systems were [x] reviewed / [] unable to be reviewed as noted in HPI Other findings are noted below. Systems: constitutional, ears/nose/mouth/throat, respiratory, gastrointestinal, genitourinary, musculoskeletal, integumentary, neurologic, psychiatric, endocrine. Positive findings noted below. Modified ESAS Completed by: provider Fatigue: 5 Drowsiness: 0 Pain: 0 Anxiety: 4 Anorexia: 5 Dyspnea: 5 Stool Occurrence(s): 1 PHYSICAL EXAM:  
 
From RN flowsheet: Wt Readings from Last 3 Encounters:  
03/18/20 139 lb 8 oz (63.3 kg) Blood pressure 140/68, pulse 88, temperature 98.4 °F (36.9 °C), resp. rate 17, height 5' 9\" (1.753 m), weight 139 lb 8 oz (63.3 kg), SpO2 94 %. Pain Scale 1: Numeric (0 - 10) Pain Intensity 1: 0 Pain Onset 1: chronic Pain Location 1: Back Pain Orientation 1: Posterior Pain Description 1: Aching Pain Intervention(s) 1: Medication (see MAR) Last bowel movement, if known:  
 
Constitutional: thin, frail, chronically ill appearing Eyes: pupils equal, anicteric ENMT: bipap Cardiovascular: regular rhythm, distal pulses intact Respiratory: mild labor as seen by accessory muscle use Gastrointestinal: soft non-tender, +bowel sounds Musculoskeletal: no deformity, no tenderness to palpation Skin: warm, dry Neurologic: fatigued,, able to following commands, weak, moving all extremities Psychiatric:unable denies, appropriate affect HISTORY:  
 
Active Problems: 
  Acute on chronic respiratory failure with hypoxia ([facility] [address] (3/11/2020) No past medical history on file. No past surgical history on file. No family history on file. History reviewed, no pertinent family history. Social History Tobacco Use  Smoking status: Not on file Substance Use Topics  Alcohol use: Not on file Allergies Allergen Reactions  Hydrocodone Nausea Only  Oxycodone Nausea Only  Percocet [Oxycodone-Acetaminophen] Nausea Only  Vicodin [Hydrocodone-Acetaminophen] Nausea Only Current Facility-Administered Medications Medication Dose Route Frequency  methylPREDNISolone (PF) (Solu-MEDROL) injection 80 mg  80 mg IntraVENous Q6H  
 [START ON 3/19/2020] dilTIAZem CD (CARDIZEM CD) capsule 300 mg  300 mg Oral DAILY  polyethylene glycol (MIRALAX) packet 17 g  17 g Oral BID  
 apixaban (ELIQUIS) tablet 5 mg  5 mg Oral Q12H  pantoprazole (PROTONIX) tablet 40 mg  40 mg Oral ACB  sodium chloride (NS) flush 5-40 mL  5-40 mL IntraVENous Q8H  
 sodium chloride (NS) flush 5-40 mL  5-40 mL IntraVENous PRN  
 ondansetron (ZOFRAN) injection 4 mg  4 mg IntraVENous Q4H PRN  
 nicotine (NICODERM CQ) 21 mg/24 hr patch 1 Patch  1 Patch TransDERmal DAILY  senna (SENOKOT) tablet 17.2 mg  2 Tab Oral QHS  docusate sodium (COLACE) capsule 100 mg  100 mg Oral TID PRN  
 atorvastatin (LIPITOR) tablet 40 mg  40 mg Oral QHS  acetaminophen (TYLENOL) tablet 650 mg  650 mg Oral Q6H PRN  
 diazePAM (VALIUM) tablet 2.5 mg  2.5 mg Oral BID  dorzolamide (TRUSOPT) 2 % ophthalmic solution 1 Drop  1 Drop Both Eyes TID  ziprasidone (GEODON) capsule 40 mg  40 mg Oral BID WITH MEALS  
 brimonidine (ALPHAGAN) 0.2 % ophthalmic solution 1 Drop  1 Drop Both Eyes TID  budesonide (PULMICORT) 500 mcg/2 ml nebulizer suspension  500 mcg Nebulization BID RT  
 arformoteroL (BROVANA) neb solution 15 mcg  15 mcg Nebulization BID RT  
 melatonin tablet 9 mg  9 mg Oral QHS  prazosin (MINIPRESS) capsule 4 mg  4 mg Oral QHS  albuterol-ipratropium (DUO-NEB) 2.5 MG-0.5 MG/3 ML  3 mL Nebulization Q4H RT  
 guaiFENesin ER (MUCINEX) tablet 1,200 mg  1,200 mg Oral BID  sodium chloride (NS) flush 5-10 mL  5-10 mL IntraVENous PRN  
 
 
 
 LAB AND IMAGING FINDINGS:  
 
Lab Results Component Value Date/Time WBC 7.7 03/18/2020 12:46 AM  
 HGB 13.1 03/18/2020 12:46 AM  
 PLATELET 754 56/26/8858 12:46 AM  
 
Lab Results Component Value Date/Time Sodium 131 (L) 03/18/2020 12:08 PM  
 Potassium 4.3 03/18/2020 12:08 PM  
 Chloride 89 (L) 03/18/2020 12:08 PM  
 CO2 41 (HH) 03/18/2020 12:08 PM  
 BUN 19 03/18/2020 12:08 PM  
 Creatinine 0.78 03/18/2020 12:08 PM  
 Calcium 8.4 (L) 03/18/2020 12:08 PM  
 Magnesium 2.1 03/18/2020 12:46 AM  
 Phosphorus 2.8 03/18/2020 12:08 PM  
  
Lab Results Component Value Date/Time AST (SGOT) 22 03/18/2020 12:46 AM  
 Alk.  phosphatase 60 03/18/2020 12:46 AM  
 Protein, total 6.2 (L) 03/18/2020 12:46 AM  
 Albumin 3.3 (L) 03/18/2020 12:08 PM  
 Globulin 2.8 03/18/2020 12:46 AM  
 
No results found for: INR, PTMR, PTP, PT1, PT2, APTT, INREXT, INREXT No results found for: IRON, FE, TIBC, IBCT, PSAT, FERR Lab Results Component Value Date/Time pH 7.46 (H) 03/18/2020 10:10 AM  
 PCO2 56 (H) 03/18/2020 10:10 AM  
 PO2 81 03/18/2020 10:10 AM  
 
No components found for: [place] No results found for: CPK, CKMB Total time: 15 min Counseling / coordination time, spent as noted above: 10  min 
> 50% counseling / coordination?: yes Prolonged service was provided for  []30 min   []75 min in face to face time in the presence of the patient, spent as noted above. Time Start:  
Time End:  
Note: this can only be billed with 68267 (initial) or 32255 (follow up). If multiple start / stop times, list each separately.

## 2020-03-19 NOTE — PROGRESS NOTES
Transition of Care  
RUR 18% EMR reviewed. Patient now DNR, followed by Palliative Care. Declined. Continue to follow in the event of arranging home care/ Hospice. 1-Followed at [facility] AND MED [facility] and had [facility] prior 2- follow for discharge planning needs

## 2020-03-19 NOTE — PROGRESS NOTES
Palliative Medicine Consult [place]berto: [phone] ([phone]) Patient Name: Mj Matos YOB: 1950 Date of Initial Consult: 3/13/2020 Reason for Consult: ES Disease Requesting Provider: Jayme SMILEY Primary Care Physician: None SUMMARY:  
Mj Matos is a 81 y.o. with a past history of COPD/GOLD3 on O2 4L continuous, +active tobacco use, HLD, HTN, Anxiety who was admitted on 3/11/2020 from home with a diagnosis of Acute Hypercapnic Respiratory Failure. Current medical issues leading to Palliative Medicine involvement include: GOC discussion in setting of ES COPD. Patient presented to Er w/ cc of worsening SOB x 2 weeks, with associated wheezing, productive cough and pain. Patient also endorsed a cough secondary to cough as well as episode of diarrhea earlier day of admission. In ER patient observed to be thin, chronically ill appearing and in respiratory distress, he had + temp 101.6, labs w/ Na 129, creatinine and albumin WNL. Patient required 4L O2. Respiratory panel returned negative. Patient started on empiric Abx and was admitted. Course of hospitalization: O2 needs increased requiring bipap. 3/13 CXR mild interstitial pulmonary edema and small right pleural effusion, ABG abnormal, CO2 83. Soon after admission, developed  Intermittent Afib with RVR, cardiology following, requiring active adjustments to cardiac meds. PALLIATIVE DIAGNOSES:  
1. Advanced care planning discussion 2. Goals of care discussion 3. DNR discussion 4. Shortness of breath 5. Anxiety PLAN:  
1. Met with patient. Reviewed the chart prior to seeing patient. Patient is being followed by her palliative team.  Patient with advanced COPD and has not been able to wean from his high flow nasal cannula or BiPAP. This is despite all aggressive measures to include nebs, steroids, antibiotics. Patient easily arouses when I come in the room.   Attempted to ascertain his understanding of the current medical issues. He continued to be in some denial as he was asking about going home. Explained again that he is on high flow nasal cannula and this cannot be done in the home setting. Was honest but empathetic with him and explained that his lung disease is so advanced that we are struggling weaning his oxygen. He did finally ask if he was \"dying \". I explained that there certainly is a possibility he may not survive this hospitalization. He continues to have hope and praying that he will improve but by the end of the visit, he seemed to have a little better understanding of the current situation. 2. Goals of care discussion-we had a lengthy discussion about his goals. He does want to continue current therapy but seem to understand that this may not be effective. He did ask if we would be sure he is comfortable if he passes. I explained that we absolutely would make sure that he was comfortable and not suffering. Our team will continue to follow closely as we have concerns that he remains at high risk for decline. 3. DNR discussion-reviewed at length resuscitation and intubation. Discussed the limitations of both of these modalities given his advanced COPD. Explained that this likely would not be effective and he would likely be very difficult to extubate. After this review, patient agreed to no intubation or resuscitation. He will need a durable DO NOT RESUSCITATE form at the time of discharge. 4. Shortness of breath-inpatient team currently managing. 5. Anxiety-remains on scheduled diazepam. 
6. Discussed with Samreen Bocanegra pulmonary, Dr. Siva Connor, bedside nurse. Will allow him to have a drink of soda which is all he is asking for. 7.  Initial consult note routed to primary continuity provider and/or primary health care team members 8. Communicated plan of care with: Palliative IDT, [place] Team,  GOALS OF CARE / TREATMENT PREFERENCES:  
 
 GOALS OF CARE: continue with full restorative tx and interventions at this time Patient/Health Care Proxy Stated Goals: Prolong life TREATMENT PREFERENCES:  
Code Status: DNR Advance Care Planning: 
[x] [facility] Interdisciplinary Team has updated the ACP Navigator with [place] and Patient Capacity Advance Care Planning 3/14/2020 Patient's Healthcare Decision Maker is: Legal Next of Kin Confirm Advance Directive None Patient Would Like to Complete Advance Directive Unable Medical Interventions: Full interventions Other Instructions: Other: As far as possible, the palliative care team has discussed with patient / health care proxy about goals of care / treatment preferences for patient. HISTORY:  
 
History obtained from: medical records/nurse/patient CHIEF COMPLAINT: still wants to go home HPI/SUBJECTIVE: The patient is:  
[x] Verbal and participatory [] Non-participatory due to:  
+sob, + anxiety 3/13- BiPAP continuously 3/17- requiring combination of  High Flow 50L  and BiPAP (mostly at night) 3/18- Slight increased O2 needs, using BiPAP more today, Hi flow 60 l/min 3/19 patient feels like his breathing is maybe a little better. Remains on high flow nasal cannula and intermittently BiPAP. Clinical Pain Assessment (nonverbal scale for severity on nonverbal patients):  
Clinical Pain Assessment Severity: 0 Location: denied pain Character: denied pain Duration: denied pain Effect: denied pain Factors: denied pain Frequency: denied pain Activity (Movement): Lying quietly, normal position Duration: for how long has pt been experiencing pain (e.g., 2 days, 1 month, years) Frequency: how often pain is an issue (e.g., several times per day, once every few days, constant) FUNCTIONAL ASSESSMENT:  
 
Palliative Performance Scale (PPS): PPS: 40  PSYCHOSOCIAL/SPIRITUAL SCREENING:  
 
 Palliative IDT has assessed this patient for cultural preferences / practices and a referral made as appropriate to needs (Cultural Services, Patient Advocacy, Ethics, etc.) Any spiritual / Zoroastrianism concerns: 
[] Yes /  [x] No 
 
Caregiver Burnout: 
[] Yes /  [] No /  [x] No Caregiver Present Anticipatory grief assessment:  
[x] Normal  / [] Maladaptive ESAS Anxiety: Anxiety: 4 ESAS Depression:    
 
 
 REVIEW OF SYSTEMS:  
 
Positive and pertinent negative findings in ROS are noted above in HPI. The following systems were [x] reviewed / [] unable to be reviewed as noted in HPI Other findings are noted below. Systems: constitutional, ears/nose/mouth/throat, respiratory, gastrointestinal, genitourinary, musculoskeletal, integumentary, neurologic, psychiatric, endocrine. Positive findings noted below. Modified ESAS Completed by: provider Fatigue: 5 Drowsiness: 0 Pain: 0 Anxiety: 4 Anorexia: 5 Dyspnea: 5 Stool Occurrence(s): 1 PHYSICAL EXAM:  
 
From RN flowsheet: 
Wt Readings from Last 3 Encounters:  
03/19/20 139 lb 3.2 oz (63.1 kg) Blood pressure 139/71, pulse 81, temperature 97.6 °F (36.4 °C), resp. rate 14, height 5' 9\" (1.753 m), weight 139 lb 3.2 oz (63.1 kg), SpO2 98 %. Pain Scale 1: Numeric (0 - 10) Pain Intensity 1: 0 Pain Onset 1: chronic Pain Location 1: Back Pain Orientation 1: Posterior Pain Description 1: Aching Pain Intervention(s) 1: Medication (see MAR) Last bowel movement, if known:  
 
Constitutional: thin, frail, chronically ill appearing, oriented and alert. Eyes: pupils equal, anicteric ENMT: High flow in place Cardiovascular: regular rhythm, distal pulses intact Respiratory: mild labor as seen by accessory muscle use, high flow nasal cannula in place Gastrointestinal: soft non-tender, +bowel sounds Musculoskeletal: no deformity, no tenderness to palpation Skin: warm, dry Neurologic: fatigued,, able to following commands, weak, moving all extremities Psychiatric:unable denies, appropriate affect HISTORY:  
 
Active Problems: 
  Acute on chronic respiratory failure with hypoxia ([facility] [address] (3/11/2020) No past medical history on file. No past surgical history on file. No family history on file. History reviewed, no pertinent family history. Social History Tobacco Use  Smoking status: Not on file Substance Use Topics  Alcohol use: Not on file Allergies Allergen Reactions  Hydrocodone Nausea Only  Oxycodone Nausea Only  Percocet [Oxycodone-Acetaminophen] Nausea Only  Vicodin [Hydrocodone-Acetaminophen] Nausea Only Current Facility-Administered Medications Medication Dose Route Frequency  methylPREDNISolone (PF) (Solu-MEDROL) injection 80 mg  80 mg IntraVENous Q6H  
 dilTIAZem CD (CARDIZEM CD) capsule 300 mg  300 mg Oral DAILY  polyethylene glycol (MIRALAX) packet 17 g  17 g Oral BID  
 apixaban (ELIQUIS) tablet 5 mg  5 mg Oral Q12H  pantoprazole (PROTONIX) tablet 40 mg  40 mg Oral ACB  sodium chloride (NS) flush 5-40 mL  5-40 mL IntraVENous Q8H  
 sodium chloride (NS) flush 5-40 mL  5-40 mL IntraVENous PRN  
 ondansetron (ZOFRAN) injection 4 mg  4 mg IntraVENous Q4H PRN  
 nicotine (NICODERM CQ) 21 mg/24 hr patch 1 Patch  1 Patch TransDERmal DAILY  senna (SENOKOT) tablet 17.2 mg  2 Tab Oral QHS  docusate sodium (COLACE) capsule 100 mg  100 mg Oral TID PRN  
 atorvastatin (LIPITOR) tablet 40 mg  40 mg Oral QHS  acetaminophen (TYLENOL) tablet 650 mg  650 mg Oral Q6H PRN  
 diazePAM (VALIUM) tablet 2.5 mg  2.5 mg Oral BID  dorzolamide (TRUSOPT) 2 % ophthalmic solution 1 Drop  1 Drop Both Eyes TID  ziprasidone (GEODON) capsule 40 mg  40 mg Oral BID WITH MEALS  
 brimonidine (ALPHAGAN) 0.2 % ophthalmic solution 1 Drop  1 Drop Both Eyes TID  budesonide (PULMICORT) 500 mcg/2 ml nebulizer suspension  500 mcg Nebulization BID RT  
 arformoteroL (BROVANA) neb solution 15 mcg  15 mcg Nebulization BID RT  
 melatonin tablet 9 mg  9 mg Oral QHS  prazosin (MINIPRESS) capsule 4 mg  4 mg Oral QHS  albuterol-ipratropium (DUO-NEB) 2.5 MG-0.5 MG/3 ML  3 mL Nebulization Q4H RT  
 guaiFENesin ER (MUCINEX) tablet 1,200 mg  1,200 mg Oral BID  sodium chloride (NS) flush 5-10 mL  5-10 mL IntraVENous PRN  
 
 
 
 LAB AND IMAGING FINDINGS:  
 
Lab Results Component Value Date/Time WBC 7.8 03/19/2020 12:23 AM  
 HGB 13.2 03/19/2020 12:23 AM  
 PLATELET 944 26/21/7172 12:23 AM  
 
Lab Results Component Value Date/Time Sodium 132 (L) 03/19/2020 12:23 AM  
 Potassium 4.0 03/19/2020 12:23 AM  
 Chloride 89 (L) 03/19/2020 12:23 AM  
 CO2 37 (H) 03/19/2020 12:23 AM  
 BUN 22 (H) 03/19/2020 12:23 AM  
 Creatinine 0.82 03/19/2020 12:23 AM  
 Calcium 8.6 03/19/2020 12:23 AM  
 Magnesium 2.1 03/19/2020 12:23 AM  
 Phosphorus 4.6 03/19/2020 12:23 AM  
  
Lab Results Component Value Date/Time AST (SGOT) 22 03/18/2020 12:46 AM  
 Alk. phosphatase 60 03/18/2020 12:46 AM  
 Protein, total 6.2 (L) 03/18/2020 12:46 AM  
 Albumin 3.2 (L) 03/19/2020 12:23 AM  
 Globulin 2.8 03/18/2020 12:46 AM  
 
No results found for: INR, PTMR, PTP, PT1, PT2, APTT, INREXT, INREXT No results found for: IRON, FE, TIBC, IBCT, PSAT, FERR Lab Results Component Value Date/Time pH 7.46 (H) 03/18/2020 10:10 AM  
 PCO2 56 (H) 03/18/2020 10:10 AM  
 PO2 81 03/18/2020 10:10 AM  
 
No components found for: [place] No results found for: CPK, CKMB Total time: 35min Counseling / coordination time, spent as noted above: 30 min 
> 50% counseling / coordination?: yes Prolonged service was provided for  []30 min   []75 min in face to face time in the presence of the patient, spent as noted above. Time Start:  
Time End:  
Note: this can only be billed with 67037 (initial) or 76464 (follow up). If multiple start / stop times, list each separately.

## 2020-03-19 NOTE — DISCHARGE SUMMARY
Physician Interim Discharge Summary Patient ID: 
Anum Agee [id number] 
81 y.o. 
1950 Admit date: 3/11/2020 Discharge date and time: TBD Hospital Course: This is an interim summary and covers the hospitalization from 3/14 to 3/19/2020. For any preceding portion of the patient's hospitalization, please see my partner's notes. Mr. Princess Hunter is a 82 yo WM w/ COPD on 4L home O2 presenting with dyspnea, admitted for COPD exacerbation. Hospital course complicated by problems as listed below: 
 
Acute on chronic respiratory failure with hypoxia: 2/2 COPD exacerbation. Very slow to improve. Still requiring NPPV. No PE on CTA chest. No PNA on imaging. Appreciate PCCM, discussed with team. Code status changed to DNR. Requesting to eat, cautiously start on PO. Goals of care discussion ongoing. Poor overall prognosis. Palliative care following. Continue nebs, steroids.  
  
A-fib: new onset, paroxysmal. Continue diltiazem, titrating dose. Continue Eliquis 
  
SIRS / Elevated lactic acid: resolved. No source of infection. Monitor clinically. No fever since admission 
  
 
  
Anxiety: was severe, now stable. Continue Valium, Geodon. Prozac on hold 
  
HTN: stable. Cont diltiazem 
  
 
 
See daily note for details. Final discharge summary will be done by the discharging physician.   
 
 
Signed: 
Adrianne Nagy MD 
3/19/2020 
7:51 PM

## 2020-03-19 NOTE — PROGRESS NOTES
0700- Bedside and Verbal shift change report given to BERNABE Castillo (oncoming nurse) by Fátima Wolff RN (offgoing nurse). Report included the following information SBAR, Kardex, ED Summary, Intake/Output and MAR.  
 
1025- Peripheral IV pulled out. Will attempt to regain access. 1150- Pt code status changed to DNR, per pulm and palliative, ok to resume diet since patient is currently on Hi Flow 
 
 
1900- Bedside and Verbal shift change report given to [address] [place] [address] (oncoming nurse) by Eunice Lorenz RN (offgoing nurse). Report included the following information SBAR, Kardex, Intake/Output and MAR.  
 
1915- Called into patient room, pt c/o difficulty breathing, placed patient on bipap, sats upper 80s.

## 2020-03-19 NOTE — ACP (ADVANCE CARE PLANNING)
Primary Decision MakerMsraah Bustillo - [phone] Advance Care Planning 3/14/2020 Patient's Healthcare Decision Maker is: Legal Next of Kin Confirm Advance Directive None Patient Would Like to Complete Advance Directive Unable Pt does not have AMD on file, verbally expressed to Palliative Medicine team on 3/13/2020 that he would want dtr Tram Ramos to serve as his surrogate decision maker if he is unable to speak for himself. Pt is  though continues to live with ex-wife, dtr is legal NOK (no mention of other family noted on chart). Pt has been educated by providers re: severity of his medical condition, wishes to remain a full code at this time.

## 2020-03-19 NOTE — PROGRESS NOTES
SW discussed pt's care with Pulmonary PA Samreen, relayed conversation to Palliative team providers. PHYLLIS Chase was valeria to connect with dtr Charmain Halsted by phone this morning and was reaching out to [facility] as well. Christopher Kwok is reportedly planning to fax copy of pt's previously signed AMD for medical record.

## 2020-03-19 NOTE — PROGRESS NOTES
[facility] 
[address] [facility], [place], [address] [place] [address] 
[phone] Medical Progress Note NAME: Anum Agee :  1950 MRM:  [id number] Date/Time: 3/19/2020 Assessment / Plan:  
 
Acute on chronic respiratory failure with hypoxia: 2/2 COPD exacerbation. Very slow to improve. Still requiring NPPV. No PE on CTA chest. No PNA on imaging. Appreciate PCCM, discussed with team. Code status changed to DNR. Requesting to eat, cautiously start on PO. Goals of care discussion ongoing. Poor overall prognosis. Palliative care following. Continue nebs, steroids. A-fib: new onset, paroxysmal. Continue diltiazem, titrating dose. Continue Eliquis SIRS / Elevated lactic acid: resolved. No source of infection. Monitor clinically. No fever since admission 
  
Hyponatremia: Stable. Possible SIADH. Stopped diuretics. HOLDING SSRI  
  
Anxiety: was severe, now stable. Continue Valium, Geodon. Prozac on hold 
  
HTN: stable. Cont diltiazem HLD: cont statin 
  
 
Total time spent: 35 minutes, d/w PCCM Time spent in the care of this patient including reviewing records, discussing with nursing and/or other providers on the treatment team, obtaining history and examining the patient, and discussing treatment plans. Care Plan discussed with: Patient, Nursing Staff and >50% of time spent in counseling and coordination of care Discussed:  Care Plan and D/C Planning Prophylaxis:  Eliquis Disposition:  Home w/Family Subjective: Chief Complaint:  Follow up COPD Chart/notes/labs/studies reviewed, patient examined at bedside. Dyspnea persists. No CP. No fevers Objective:  
 
 
Vitals:  
 
  
Last 24hrs VS reviewed since prior progress note. Most recent are: 
 
Visit Vitals /84 (BP 1 Location: Right arm, BP Patient Position: Head of bed elevated (Comment degrees)) Pulse (!) 103 Temp 98.4 °F (36.9 °C) Resp 21 Ht 5' 9\" (1.753 m) Wt 63.1 kg (139 lb 3.2 oz) SpO2 96% BMI 20.56 kg/m² SpO2 Readings from Last 6 Encounters:  
03/19/20 96% O2 Flow Rate (L/min): 40 l/min Intake/Output Summary (Last 24 hours) at 3/19/2020 1518 Last data filed at 3/19/2020 0206 Gross per 24 hour Intake 220 ml Output 800 ml Net -580 ml Exam:  
 
Physical Exam: 
 
Gen: disheveled, frail, chronically ill-appearing HEENT:  Sclerae nonicteric, hearing intact to voice, mucous membranes moist 
Neck:  Supple, without masses. Resp:  No accessory muscle use with increased WOB. Poor air movement. Exp wheezing. No rales or rhonchi 
Card: RRR, without m/r/g. No LE edema. Abd:  +bowel sounds, soft, NTTP, nondistended. No HSM. Neuro: Face symmetric, tongue midline, speech fluent, follows commands appropriately Psych:  Alert, oriented x 3. Limited insight Medications Reviewed: (see below) Lab Data Reviewed: (see below) 
 
______________________________________________________________________ Medications:  
 
Current Facility-Administered Medications Medication Dose Route Frequency  methylPREDNISolone (PF) (Solu-MEDROL) injection 80 mg  80 mg IntraVENous Q6H  
 dilTIAZem CD (CARDIZEM CD) capsule 300 mg  300 mg Oral DAILY  polyethylene glycol (MIRALAX) packet 17 g  17 g Oral BID  
 apixaban (ELIQUIS) tablet 5 mg  5 mg Oral Q12H  pantoprazole (PROTONIX) tablet 40 mg  40 mg Oral ACB  sodium chloride (NS) flush 5-40 mL  5-40 mL IntraVENous Q8H  
 sodium chloride (NS) flush 5-40 mL  5-40 mL IntraVENous PRN  
 ondansetron (ZOFRAN) injection 4 mg  4 mg IntraVENous Q4H PRN  
 nicotine (NICODERM CQ) 21 mg/24 hr patch 1 Patch  1 Patch TransDERmal DAILY  senna (SENOKOT) tablet 17.2 mg  2 Tab Oral QHS  docusate sodium (COLACE) capsule 100 mg  100 mg Oral TID PRN  
 atorvastatin (LIPITOR) tablet 40 mg  40 mg Oral QHS  acetaminophen (TYLENOL) tablet 650 mg  650 mg Oral Q6H PRN  
  diazePAM (VALIUM) tablet 2.5 mg  2.5 mg Oral BID  dorzolamide (TRUSOPT) 2 % ophthalmic solution 1 Drop  1 Drop Both Eyes TID  ziprasidone (GEODON) capsule 40 mg  40 mg Oral BID WITH MEALS  
 brimonidine (ALPHAGAN) 0.2 % ophthalmic solution 1 Drop  1 Drop Both Eyes TID  budesonide (PULMICORT) 500 mcg/2 ml nebulizer suspension  500 mcg Nebulization BID RT  
 arformoteroL (BROVANA) neb solution 15 mcg  15 mcg Nebulization BID RT  
 melatonin tablet 9 mg  9 mg Oral QHS  prazosin (MINIPRESS) capsule 4 mg  4 mg Oral QHS  albuterol-ipratropium (DUO-NEB) 2.5 MG-0.5 MG/3 ML  3 mL Nebulization Q4H RT  
 guaiFENesin ER (MUCINEX) tablet 1,200 mg  1,200 mg Oral BID  sodium chloride (NS) flush 5-10 mL  5-10 mL IntraVENous PRN Lab Review:  
 
Recent Labs  
  03/19/20 
0023 03/18/20 
0046 03/17/20 
0122 WBC 7.8 7.7 6.6 HGB 13.2 13.1 12.8 HCT 38.9 39.4 37.6  164 172 Recent Labs  
  03/19/20 
0023 03/18/20 
1208 03/18/20 
0046 03/17/20 
0122 * 131* 128* 130*  
K 4.0 4.3 4.0 3.7 CL 89* 89* 84* 88* CO2 37* 41* 41* 40* * 156* 170* 154* BUN 22* 19 22* 22* CREA 0.82 0.78 0.92 0.90  
CA 8.6 8.4* 8.9 8.5 MG 2.1  --  2.1 1.6 PHOS 4.6 2.8 3.0 3.5 ALB 3.2* 3.3* 3.4* 3.2* SGOT  --   --  22  --   
ALT  --   --  38  -- No components found for: [place] Recent Labs  
  03/18/20 
1010 PH 7.46* PCO2 56* PO2 81 HCO3 38* FIO2 60 No results for input(s): INR, INREXT, INREXT in the last 72 hours. No results found for: SDES Lab Results Component Value Date/Time Culture result: NO GROWTH 6 DAYS 03/11/2020 10:24 PM  
 Culture result: NO GROWTH 6 DAYS 03/11/2020 10:09 PM  
 
        
___________________________________________________ Attending Physician: Merrick Pepe MD

## 2020-03-19 NOTE — PROGRESS NOTES
[facility] Pulmonary, Critical Care, and Sleep Medicine Progress note Name: Aretha Barnes MRN: [id number] : 1950 Hospital: [address] [facility] Date: 3/19/2020 IMPRESSION:  
· Acute on chronic hypoxemic/hypercapnic respiratory failure · Advanced COPD w/ acute exacerbation · Hyponatremia - better today · New onset afib 
· HTN 
· Tobacco abuse RECOMMENDATIONS:  
· Remain on continuous HF vs NIV · Rate control with Cardizem per cardiology · Would benefit from trilogy on discharge · Continue scheduled duonebs, pulmicort, brovana, flutter valve · Continue IV steroids at 80mg q 6hr · Completed 5 day course of Doxy; no additional abx indicated at this time given clear CXR and normal WBC. Suspect elevated LA related to diuresis/contraction alkalosis · Holding Bumex for now given elevated lactic acid and alkalosis on metabolic panel and ABG · Speech eval - functional swallow; aspiration risk due to HF and respiratory status. Continue NPO given worsening resp status. · K, Phos, and Mag okay · Agree with holding SSRI - trend Na · On protonix for GERD 
· On lovenox for DVT ppx · Palliative care following Discussed advanced stage disease with patient and he is not significantly improving despite aggressive measures. Patient at this time indicates he would like to remain full code. Patient gave permission to call and discuss his case with ex-wife Kyleigh Roa. He states he wants both Kyleigh Roa and daughter Francisco Glez to make decisions for him in the event he is unable to do so. Kyleigh Roa states he had filled out some type of AMD and other paper work through the [place]. She is going to attempt to fax that to the Palliative Care team.  Discussed above with Pat Moreno LCSW. Patient is critically ill with severe hypoxic, hypercapnic respiratory failure requiring continuous NIV and is high risk for further decompensation. CC time EOP 40+ min. Discussed with nursing and hospitalist. 
  
 
Subjective:  
 
Mr. Maxime Dumont is a 70yo male w/ history of chronic hypoxemic respiratory failure (on 4L at baseline), COPD and anxiety who presented to the ER on 3/11 w/ complaints of shortness of breath x 2 weeks, wheezing, cough productive of brown sputum, pleuritic chest pain and sore throat. Denies sick contacts or recent travel. Does report frequent coughing while eating/drinking. Is mostly homebound but gets to the [place]  for appointments and was last there on Monday. He is followed by pulmonary at the [place] -- sees Dr. Sofi Jose. Smokes 1ppd (down from 3ppd) since age 15. 
 
3/11 - CTA chest: emphysematous changes. No asdz. Negative for PE *all cultures NGTD *RVP negative Interval history: Afebrile Sats 86% on 40L HF FiO2 70%; I increased his FiO2 to 100% and his sats improved to 94-95% BP and HR stable currently Na 132 - better Bicarb 37 - better Lactic acid 1.8 - better ABG 7.46/56/81/38 on NOV 24/6 FiO2 60% 3/15 ECHO: EF 55-60%; no valve abnormalities ROS: Continues to minimize his symptoms. States. \"I just need to do better breathing through my nose. \"  Denies fever or chills. Denies CP. Reports sore neck and asking for Tylenol. PMH - COPD, chronic respiratory failure, home oxygen, hypertension Prior to Admission medications Medication Sig Start Date End Date Taking? Authorizing Provider  
predniSONE (DELTASONE) 5 mg tablet Take 5 mg by mouth daily. Yes Provider, Historical  
albuterol (PROVENTIL HFA, VENTOLIN HFA, PROAIR HFA) 90 mcg/actuation inhaler Take 2 Puffs by inhalation every four (4) hours as needed for Wheezing or Shortness of Breath. Yes Provider, Historical  
aspirin delayed-release 81 mg tablet Take 81 mg by mouth daily. Yes Provider, Historical  
atorvastatin (LIPITOR) 80 mg tablet Take 40 mg by mouth nightly.    Yes Provider, Historical  
docusate sodium (COLACE) 100 mg capsule Take 100 mg by mouth three (3) times daily as needed for Constipation. Yes Provider, Historical  
senna (SENOKOT) 8.6 mg tablet Take 2 Tabs by mouth nightly. Yes Provider, Historical  
psyllium (METAMUCIL) powd Take 1 Dose by mouth two (2) times a day. 2 teaspoons in liquid twice daily    Yes Provider, Historical  
diazePAM (VALIUM) 5 mg tablet Take 2.5 mg by mouth daily. Indications: anxious   Yes Provider, Historical  
cholecalciferol (VITAMIN D3) (1000 Units /25 mcg) tablet Take 2,000 Units by mouth daily. Yes Provider, Historical  
denosumab (PROLIA) 60 mg/mL injection 60 mg by SubCUTAneous route See Admin Instructions. One syringe subq every 6 months. Due 3/13    Yes Provider, Historical  
ipratropium (ATROVENT) 0.02 % soln 0.5 mg by Nebulization route every four (4) hours as needed (shortness of breath). Yes Provider, Historical  
guaiFENesin (ORGANIDIN) 200 mg tablet Take 200 mg by mouth every six (6) hours as needed for Congestion. Yes Provider, Historical  
metoprolol tartrate (LOPRESSOR) 50 mg tablet Take 50 mg by mouth two (2) times a day. Patient takes in morning (0900) and afternoon (1600)   Yes Provider, Historical  
albuterol (PROVENTIL VENTOLIN) 2.5 mg /3 mL (0.083 %) nebu 2.5 mg by Nebulization route every six (6) hours as needed for Wheezing. Yes Provider, Historical  
budesonide-formoteroL (SYMBICORT) 160-4.5 mcg/actuation HFAA Take 2 Puffs by inhalation two (2) times a day. Yes Provider, Historical  
polyethylene glycol (MIRALAX) 17 gram packet Take 17 g by mouth daily as needed for Constipation. Yes Provider, Historical  
pantoprazole (PROTONIX) 40 mg tablet Take 40 mg by mouth daily. Yes Provider, Historical  
melatonin 5 mg tablet Take 10 mg by mouth nightly. Yes Provider, Historical  
prazosin (MINIPRESS) 2 mg capsule Take 4 mg by mouth nightly. Yes Provider, Historical  
ziprasidone (GEODON) 40 mg capsule Take 40 mg by mouth two (2) times daily (with meals).    Yes Provider, Historical  
 FLUoxetine (PROzac) 20 mg capsule Take 60 mg by mouth daily. Yes Provider, Historical  
pediatric multivitamins chewable tablet Take 1 Tab by mouth daily. Yes Provider, Historical  
brinzolamide-brimonidine 1-0.2 % drps Administer 1 Drop to both eyes three (3) times daily. Yes Provider, Historical  
diazePAM (VALIUM) 5 mg tablet Take 5 mg by mouth nightly. Indications: anxious   Yes Provider, Historical  
 
Allergies Allergen Reactions  Hydrocodone Nausea Only  Oxycodone Nausea Only  Percocet [Oxycodone-Acetaminophen] Nausea Only  Vicodin [Hydrocodone-Acetaminophen] Nausea Only Social History Tobacco Use  Smoking status: Not on file Substance Use Topics  Alcohol use: Not on file No family history on file. Current Facility-Administered Medications Medication Dose Route Frequency  methylPREDNISolone (PF) (Solu-MEDROL) injection 80 mg  80 mg IntraVENous Q6H  
 dilTIAZem CD (CARDIZEM CD) capsule 300 mg  300 mg Oral DAILY  polyethylene glycol (MIRALAX) packet 17 g  17 g Oral BID  
 apixaban (ELIQUIS) tablet 5 mg  5 mg Oral Q12H  pantoprazole (PROTONIX) tablet 40 mg  40 mg Oral ACB  sodium chloride (NS) flush 5-40 mL  5-40 mL IntraVENous Q8H  
 nicotine (NICODERM CQ) 21 mg/24 hr patch 1 Patch  1 Patch TransDERmal DAILY  senna (SENOKOT) tablet 17.2 mg  2 Tab Oral QHS  atorvastatin (LIPITOR) tablet 40 mg  40 mg Oral QHS  diazePAM (VALIUM) tablet 2.5 mg  2.5 mg Oral BID  dorzolamide (TRUSOPT) 2 % ophthalmic solution 1 Drop  1 Drop Both Eyes TID  ziprasidone (GEODON) capsule 40 mg  40 mg Oral BID WITH MEALS  
 brimonidine (ALPHAGAN) 0.2 % ophthalmic solution 1 Drop  1 Drop Both Eyes TID  budesonide (PULMICORT) 500 mcg/2 ml nebulizer suspension  500 mcg Nebulization BID RT  
 arformoteroL (BROVANA) neb solution 15 mcg  15 mcg Nebulization BID RT  
 melatonin tablet 9 mg  9 mg Oral QHS  prazosin (MINIPRESS) capsule 4 mg  4 mg Oral QHS  albuterol-ipratropium (DUO-NEB) 2.5 MG-0.5 MG/3 ML  3 mL Nebulization Q4H RT  
 guaiFENesin ER (MUCINEX) tablet 1,200 mg  1,200 mg Oral BID Objective:  
Vital Signs:   
Visit Vitals /71 (BP 1 Location: Right arm, BP Patient Position: At rest) Pulse 81 Temp 97.6 °F (36.4 °C) Resp 14 Ht 5' 9\" (1.753 m) Wt 63.1 kg (139 lb 3.2 oz) SpO2 98% BMI 20.56 kg/m² O2 Device: Hi flow nasal cannula, Heated O2 Flow Rate (L/min): 40 l/min Temp (24hrs), Av.7 °F (36.5 °C), Min:96.9 °F (36.1 °C), Max:98.4 °F (36.9 °C) Intake/Output:  
Last shift:      No intake/output data recorded. Last 3 shifts:  1901 -  0700 In: 56 [P.O.:890] Out: 2550 [Urine:2550] Intake/Output Summary (Last 24 hours) at 3/19/2020 0224 Last data filed at 3/19/2020 0206 Gross per 24 hour Intake 220 ml Output 1050 ml Net -830 ml Physical Exam:  
General:  Awake, alert, on HF, + resp distress Head:  NCAT Eyes:  EOMI, anicteric Nose: Nares clear, HF in place, septum midline Throat: Clear, MM Neck: Trachea midline,supple Lungs:   Diminished, bilateral wheeze, tachypnea, increased WOB Chest wall:  Normal shape, nontender Heart:  RRR Abdomen:   Soft, NT, ND, +bowel sounds Extremities: No edema Pulses: 2+ Skin: Dry, intact, tattoos Lymph nodes: No cervical lymphadenopathy Neurologic: No focal findings, oriented x 3, poor insight into disease Data review:  
 
Recent Results (from the past 24 hour(s)) BLOOD GAS, ARTERIAL Collection Time: 20 10:10 AM  
Result Value Ref Range pH 7.46 (H) 7.35 - 7.45    
 PCO2 56 (H) 35.0 - 45.0 mmHg PO2 81 80 - 100 mmHg O2 SAT 96 92 - 97 % BICARBONATE 38 (H) 22 - 26 mmol/L  
 BASE EXCESS 11.9 mmol/L  
 O2 METHOD BIPAP    
 FIO2 60 % IPAP/PIP 24 EPAP/CPAP/PEEP 6 Sample source ARTERIAL    
 SITE RIGHT BRACHIAL DOROTHY'S TEST YES    
RENAL FUNCTION PANEL  Collection Time: 20 12:08 PM  
 Result Value Ref Range Sodium 131 (L) 136 - 145 mmol/L Potassium 4.3 3.5 - 5.1 mmol/L Chloride 89 (L) 97 - 108 mmol/L  
 CO2 41 (HH) 21 - 32 mmol/L Anion gap 1 (L) 5 - 15 mmol/L Glucose 156 (H) 65 - 100 mg/dL BUN 19 6 - 20 MG/DL Creatinine 0.78 0.70 - 1.30 MG/DL  
 BUN/Creatinine ratio 24 (H) 12 - 20 GFR est AA >60 >60 ml/min/1.73m2 GFR est non-AA >60 >60 ml/min/1.73m2 Calcium 8.4 (L) 8.5 - 10.1 MG/DL Phosphorus 2.8 2.6 - 4.7 MG/DL Albumin 3.3 (L) 3.5 - 5.0 g/dL LACTIC ACID Collection Time: 03/18/20 12:08 PM  
Result Value Ref Range Lactic acid 1.8 0.4 - 2.0 MMOL/L  
CBC W/O DIFF Collection Time: 03/19/20 12:23 AM  
Result Value Ref Range WBC 7.8 4.1 - 11.1 K/uL  
 RBC 3.98 (L) 4.10 - 5.70 M/uL  
 HGB 13.2 12.1 - 17.0 g/dL HCT 38.9 36.6 - 50.3 % MCV 97.7 80.0 - 99.0 FL  
 MCH 33.2 26.0 - 34.0 PG  
 MCHC 33.9 30.0 - 36.5 g/dL  
 RDW 11.7 11.5 - 14.5 % PLATELET 567 153 - 660 K/uL MPV 9.3 8.9 - 12.9 FL  
 NRBC 0.0 0  WBC ABSOLUTE NRBC 0.00 0.00 - 0.01 K/uL RENAL FUNCTION PANEL Collection Time: 03/19/20 12:23 AM  
Result Value Ref Range Sodium 132 (L) 136 - 145 mmol/L Potassium 4.0 3.5 - 5.1 mmol/L Chloride 89 (L) 97 - 108 mmol/L  
 CO2 37 (H) 21 - 32 mmol/L Anion gap 6 5 - 15 mmol/L Glucose 178 (H) 65 - 100 mg/dL BUN 22 (H) 6 - 20 MG/DL Creatinine 0.82 0.70 - 1.30 MG/DL  
 BUN/Creatinine ratio 27 (H) 12 - 20 GFR est AA >60 >60 ml/min/1.73m2 GFR est non-AA >60 >60 ml/min/1.73m2 Calcium 8.6 8.5 - 10.1 MG/DL Phosphorus 4.6 2.6 - 4.7 MG/DL Albumin 3.2 (L) 3.5 - 5.0 g/dL MAGNESIUM Collection Time: 03/19/20 12:23 AM  
Result Value Ref Range Magnesium 2.1 1.6 - 2.4 mg/dL Imaging: 
I have personally reviewed the patients radiographs and have reviewed the reports: CXR 3/18/2020 - hyperinflation, no acute process.   
 
  
Juliet Ventura

## 2020-03-19 NOTE — ROUTINE PROCESS
2018 
Patient ex-wife would like to talk to the pulmonologist regarding the discussion of intubation. Patient would also like for the care team here to reach out to the doctors at the [place]. 6194 Patient has loss about 7 lbs since admission on 3/11. 9805 Charmain Halsted called to get an update on the patient. She is aware that Morris was trying to reach her. She plans on trying to come by this afternoon. 8121 Bedside and Verbal shift change report given to Thalia (oncoming nurse) by Corbin Cisse (offgoing nurse). Report included the following information SBAR, Kardex, ED Summary, Procedure Summary, Intake/Output, MAR, Recent Results and Cardiac Rhythm NSR.

## 2020-03-20 PROBLEM — I48.0 PAF (PAROXYSMAL ATRIAL FIBRILLATION) (HCC): Status: ACTIVE | Noted: 2020-01-01

## 2020-03-20 PROBLEM — I48.0 PAF (PAROXYSMAL ATRIAL FIBRILLATION) (HCC): Chronic | Status: ACTIVE | Noted: 2020-01-01

## 2020-03-20 PROBLEM — F41.9 ANXIETY: Status: ACTIVE | Noted: 2020-01-01

## 2020-03-20 PROBLEM — I10 HTN (HYPERTENSION): Status: ACTIVE | Noted: 2020-01-01

## 2020-03-20 PROBLEM — I10 HTN (HYPERTENSION): Chronic | Status: ACTIVE | Noted: 2020-01-01

## 2020-03-20 PROBLEM — J44.1 COPD WITH ACUTE EXACERBATION (HCC): Status: ACTIVE | Noted: 2020-01-01

## 2020-03-20 NOTE — PROGRESS NOTES
1930: Bedside and Verbal shift change report given to [address] [facility] Molly Se (oncoming nurse) by Bartolo Conde RN (offgoing nurse). Report included the following information SBAR and Kardex. 0720: Bedside and Verbal shift change report given to Carlos Alberto Yost (oncoming nurse) by [address] (offgoing nurse). Report included the following information SBAR and Kardex. Problem: Falls - Risk of 
Goal: *Absence of Falls Description: Document Gracie Muse Fall Risk and appropriate interventions in the flowsheet. Outcome: Progressing Towards Goal 
Note: Fall Risk Interventions: 
Mobility Interventions: Bed/chair exit alarm, Patient to call before getting OOB Medication Interventions: Bed/chair exit alarm Elimination Interventions: Bed/chair exit alarm, Call light in reach History of Falls Interventions: Bed/chair exit alarm Problem: Patient Education: Go to Patient Education Activity Goal: Patient/Family Education Outcome: Progressing Towards Goal 
  
Problem: Risk for Spread of Infection Goal: Prevent transmission of infectious organism to others Description: Prevent the transmission of infectious organisms to other patients, staff members, and visitors. Outcome: Progressing Towards Goal 
  
Problem: Patient Education:  Go to Education Activity Goal: Patient/Family Education Outcome: Progressing Towards Goal 
  
Problem: Pressure Injury - Risk of 
Goal: *Prevention of pressure injury Description: Document Andre Scale and appropriate interventions in the flowsheet. Outcome: Progressing Towards Goal 
Note: Pressure Injury Interventions: Activity Interventions: Assess need for specialty bed Mobility Interventions: Assess need for specialty bed Nutrition Interventions: Document food/fluid/supplement intake, Discuss nutritional consult with provider Friction and Shear Interventions: Apply protective barrier, creams and emollients, Minimize layers Problem: Patient Education: Go to Patient Education Activity Goal: Patient/Family Education Outcome: Progressing Towards Goal 
  
Problem: Chronic Obstructive Pulmonary Disease (COPD) Goal: *Oxygen saturation during activity within specified parameters Outcome: Progressing Towards Goal 
Goal: *Able to remain out of bed as prescribed Outcome: Progressing Towards Goal 
Goal: *Absence of hypoxia Outcome: Progressing Towards Goal 
Goal: *Optimize nutritional status Outcome: Progressing Towards Goal 
  
Problem: Patient Education: Go to Patient Education Activity Goal: Patient/Family Education Outcome: Progressing Towards Goal

## 2020-03-20 NOTE — PROGRESS NOTES
Luis Palacio [place] [address] 
[address] [facility], [address] [place] [address] 
[phone] Medical Progress Note NAME: Jhonny La :  1950 MRM:  [id number] Date/Time: 3/20/2020  9:49 AM 
 
 
  
Subjective: Chief Complaint:  SOB: moderate to severe, constant, better overall but only slightly, worse with exertion ROS: 
(bold if positive, if negative) Cough SOB/[place] Tolerating Diet Objective:  
 
 
Vitals:  
 
 
  
Last 24hrs VS reviewed since prior progress note. Most recent are: 
 
Visit Vitals /64 (BP 1 Location: Right arm, BP Patient Position: Sitting) Pulse (!) 102 Temp 97.9 °F (36.6 °C) Resp 25 Ht 5' 9\" (1.753 m) Wt 62.5 kg (137 lb 12.8 oz) SpO2 95% BMI 20.35 kg/m² SpO2 Readings from Last 6 Encounters:  
20 95% O2 Flow Rate (L/min): 40 l/min Intake/Output Summary (Last 24 hours) at 3/20/2020 7261 Last data filed at 3/20/2020 4292 Gross per 24 hour Intake 240 ml Output  Net 240 ml Exam:  
 
Physical Exam: 
 
Gen:  Well-developed, thin, frail, elderly, in no acute distress HEENT:  Pink conjunctivae, PERRL, hearing intact to voice, moist mucous membranes Neck:  Supple, without masses, thyroid non-tender Resp:  No accessory muscle use, poor air movement, diffuse end expiratory wheezing throughout Card:  No murmurs, normal S1, S2 without thrills, bruits or peripheral edema Abd:  Soft, non-tender, non-distended, normoactive bowel sounds are present, no palpable organomegaly and no detectable hernias Lymph:  No cervical or inguinal adenopathy Musc:  No cyanosis or clubbing Skin:  No rashes or ulcers, skin turgor is good Neuro:  Cranial nerves are grossly intact, no focal motor weakness, follows commands appropriately Psych:  Good insight, oriented to person, place and time, alert Telemetry reviewed:   normal sinus rhythm Medications Reviewed: (see below) Lab Data Reviewed: (see below) 
 
______________________________________________________________________ Medications:  
 
Current Facility-Administered Medications Medication Dose Route Frequency  traMADoL (ULTRAM) tablet 50 mg  50 mg Oral Q6H PRN  
 methylPREDNISolone (PF) (Solu-MEDROL) injection 80 mg  80 mg IntraVENous Q6H  
 dilTIAZem CD (CARDIZEM CD) capsule 300 mg  300 mg Oral DAILY  polyethylene glycol (MIRALAX) packet 17 g  17 g Oral BID  
 apixaban (ELIQUIS) tablet 5 mg  5 mg Oral Q12H  pantoprazole (PROTONIX) tablet 40 mg  40 mg Oral ACB  sodium chloride (NS) flush 5-40 mL  5-40 mL IntraVENous Q8H  
 sodium chloride (NS) flush 5-40 mL  5-40 mL IntraVENous PRN  
 ondansetron (ZOFRAN) injection 4 mg  4 mg IntraVENous Q4H PRN  
 nicotine (NICODERM CQ) 21 mg/24 hr patch 1 Patch  1 Patch TransDERmal DAILY  senna (SENOKOT) tablet 17.2 mg  2 Tab Oral QHS  docusate sodium (COLACE) capsule 100 mg  100 mg Oral TID PRN  
 atorvastatin (LIPITOR) tablet 40 mg  40 mg Oral QHS  acetaminophen (TYLENOL) tablet 650 mg  650 mg Oral Q6H PRN  
 diazePAM (VALIUM) tablet 2.5 mg  2.5 mg Oral BID  dorzolamide (TRUSOPT) 2 % ophthalmic solution 1 Drop  1 Drop Both Eyes TID  ziprasidone (GEODON) capsule 40 mg  40 mg Oral BID WITH MEALS  
 brimonidine (ALPHAGAN) 0.2 % ophthalmic solution 1 Drop  1 Drop Both Eyes TID  budesonide (PULMICORT) 500 mcg/2 ml nebulizer suspension  500 mcg Nebulization BID RT  
 arformoteroL (BROVANA) neb solution 15 mcg  15 mcg Nebulization BID RT  
 melatonin tablet 9 mg  9 mg Oral QHS  prazosin (MINIPRESS) capsule 4 mg  4 mg Oral QHS  albuterol-ipratropium (DUO-NEB) 2.5 MG-0.5 MG/3 ML  3 mL Nebulization Q4H RT  
 guaiFENesin ER (MUCINEX) tablet 1,200 mg  1,200 mg Oral BID  sodium chloride (NS) flush 5-10 mL  5-10 mL IntraVENous PRN Lab Review:  
 
Recent Labs  
  03/20/20 
0145 03/19/20 
0023 03/18/20 
0046 WBC 5.4 7.8 7.7 HGB 12.6 13.2 13.1 HCT 37.8 38.9 39.4  174 164 Recent Labs  
  03/20/20 
0145 03/19/20 
0023 03/18/20 
1208 03/18/20 
0046 * 132* 131* 128* K 4.1 4.0 4.3 4.0  
CL 92* 89* 89* 84* CO2 38* 37* 41* 41* * 178* 156* 170* BUN 23* 22* 19 22* CREA 0.92 0.82 0.78 0.92  
CA 8.3* 8.6 8.4* 8.9 MG 2.1 2.1  --  2.1 PHOS 3.5 4.6 2.8 3.0 ALB 3.1* 3.2* 3.3* 3.4* TBILI 0.6  --   --  0.5 SGOT 17  --   --  22 ALT 34  --   --  38 No results found for: [facility] Recent Labs  
  03/18/20 
1010 PH 7.46* PCO2 56* PO2 81 HCO3 38* FIO2 60 No results for input(s): INR, INREXT in the last 72 hours. No results found for: SDES Lab Results Component Value Date/Time Culture result: NO GROWTH 6 DAYS 03/11/2020 10:24 PM  
 Culture result: NO GROWTH 6 DAYS 03/11/2020 10:09 PM  
 
 
 
  
Assessment:  
 
Principal Problem: COPD with acute exacerbation ([address] (3/20/2020) Active Problems: 
  Acute on chronic respiratory failure with hypoxia ([address] (3/11/2020) PAF (paroxysmal atrial fibrillation) ([facility] (3/20/2020) HTN (hypertension) (3/20/2020) Anxiety (3/20/2020) Plan:  
 
Principal Problem: COPD with acute exacerbation ([address] (3/20/2020) - continue nebs, steroids, etc. 
 
Active Problems: 
  Acute on chronic respiratory failure with hypoxia ([facility] (3/11/2020) - NIPPV at night, hiflow during day for now 
 - cannot go home on this regimen 
 - weaning as able but if unable to come off hiflow, he will consider hospice - Palliative Care to continue working with him PAF (paroxysmal atrial fibrillation) ([facility] (3/20/2020) - continue diltiazem HTN (hypertension) (3/20/2020) - BP okay on meds as above Anxiety (3/20/2020) - continue psych meds Total time spent in patient care: 35 minutes Care Plan discussed with: Patient, Care Manager, Nursing Staff and Juliet Dyer 
 
 Discussed:  Code Status, Care Plan and D/C Planning Prophylaxis:  Eliquis Disposition:  TBD, may consider hospice 
        
___________________________________________________ Attending Physician: Greg Connolly MD

## 2020-03-20 NOTE — PROGRESS NOTES
3/20/2020  
10:48AM 
CM s/w pt's wife Carie Crespo, updated on d/c, no plan for d/c in next 2-3 days, she requested medical update from bedside RN Lloyd Kaiser, notified Lloyd Kaiser, she will call wife. Ian Vaughn  9:36 AM 
EMR reviewed pt is continuing to require medical management for acute respiratory failure w/ hypoxia, remains on [place] O2 at 40 L., IV steriods AFib and COPD exacerbation FELIX Plan   Re-Admission Risk is 18% 1. CM to follow pt for d/c needs, has declined palliative care 2. [facility] 3. Resume [facility] at d/c for SN,PT,OT, will need order. 4. D/C when stable to home w/ family assistance Plan A: Home  Hospice Plan B: Home w/ family assistance, [facility] 5. Pt will require BLS transport at d/c  
Ian Vaughn

## 2020-03-20 NOTE — PROGRESS NOTES
[facility] Progress note Name: Jennifer Ramirez MRN: [id number] : 1950 Hospital: [address] [place] [address] Date: 3/20/2020 IMPRESSION:  
· Acute on chronic hypoxemic/hypercapnic respiratory failure · Advanced COPD w/ acute exacerbation · Hyponatremia - better today · New onset afib 
· HTN 
· Tobacco abuse RECOMMENDATIONS:  
· Remain on continuous HF vs NIV; would continue NIV nightly · Rate control with Cardizem per cardiology · Would benefit from trilogy on discharge · Continue scheduled duonebs, pulmicort, brovana, flutter valve · Continue IV steroids at 80mg q 6hr · Completed 5 day course of Doxy; no additional abx indicated at this time given clear CXR and normal WBC. Suspect elevated LA related to diuresis/contraction alkalosis · Holding Bumex for now given elevated lactic acid and alkalosis on metabolic panel and ABG · Speech eval - functional swallow; aspiration risk due to HF and respiratory status. He is now DNR and diet has been resumed per patient request.  He understands he is still at increased aspiration risk. · Agree with holding SSRI - trend Na · On protonix for GERD 
· On lovenox for DVT ppx · Greatly appreciate assistance of Palliative Care team.  Ongoing discussions with patient and family. He is now DNR, but not yet ready for comfort measures. Patient is critically ill with severe hypoxic, hypercapnic respiratory failure requiring continuous NIV and is high risk for further decompensation. CC time EOP 30+ min. Subjective:  
 
Mr. Javon Patterson is a 70yo male w/ history of chronic hypoxemic respiratory failure (on 4L at baseline), COPD and anxiety who presented to the ER on 3/11 w/ complaints of shortness of breath x 2 weeks, wheezing, cough productive of brown sputum, pleuritic chest pain and sore throat. Denies sick contacts or recent travel. Does report frequent coughing while eating/drinking. Is mostly homebound but gets to the [place]  for appointments and was last there on Monday. He is followed by pulmonary at the [place] -- sees Dr. Latoya Brewer. Smokes 1ppd (down from 3ppd) since age 15. 
 
3/11 - CTA chest: emphysematous changes. No asdz. Negative for PE *all cultures NGTD *RVP negative Interval history: Afebrile Sats 95% on 40L HF FiO2 80% BP and HR stable currently Na 132 - stable Bicarb 38 - better 3/18 ABG 7.46/56/81/38 on NIV 24/6 FiO2 60% 3/15 ECHO: EF 55-60%; no valve abnormalities ROS: States he doesn't feel well this morning. Reports nausea. Breathing mostly unchanged. Denies fever or chills. Denies CP. States, \"I want to get better. \" 
 
PMH - COPD, chronic respiratory failure, home oxygen, hypertension Prior to Admission medications Medication Sig Start Date End Date Taking? Authorizing Provider  
predniSONE (DELTASONE) 5 mg tablet Take 5 mg by mouth daily. Yes Provider, Historical  
albuterol (PROVENTIL HFA, VENTOLIN HFA, PROAIR HFA) 90 mcg/actuation inhaler Take 2 Puffs by inhalation every four (4) hours as needed for Wheezing or Shortness of Breath. Yes Provider, Historical  
aspirin delayed-release 81 mg tablet Take 81 mg by mouth daily. Yes Provider, Historical  
atorvastatin (LIPITOR) 80 mg tablet Take 40 mg by mouth nightly. Yes Provider, Historical  
docusate sodium (COLACE) 100 mg capsule Take 100 mg by mouth three (3) times daily as needed for Constipation. Yes Provider, Historical  
senna (SENOKOT) 8.6 mg tablet Take 2 Tabs by mouth nightly. Yes Provider, Historical  
psyllium (METAMUCIL) powd Take 1 Dose by mouth two (2) times a day. 2 teaspoons in liquid twice daily    Yes Provider, Historical  
diazePAM (VALIUM) 5 mg tablet Take 2.5 mg by mouth daily.  Indications: anxious   Yes Provider, Historical  
cholecalciferol (VITAMIN D3) (1000 Units /25 mcg) tablet Take 2,000 Units by mouth daily. Yes Provider, Historical  
denosumab (PROLIA) 60 mg/mL injection 60 mg by SubCUTAneous route See Admin Instructions. One syringe subq every 6 months. Due 3/13    Yes Provider, Historical  
ipratropium (ATROVENT) 0.02 % soln 0.5 mg by Nebulization route every four (4) hours as needed (shortness of breath). Yes Provider, Historical  
guaiFENesin (ORGANIDIN) 200 mg tablet Take 200 mg by mouth every six (6) hours as needed for Congestion. Yes Provider, Historical  
metoprolol tartrate (LOPRESSOR) 50 mg tablet Take 50 mg by mouth two (2) times a day. Patient takes in morning (0900) and afternoon (1600)   Yes Provider, Historical  
albuterol (PROVENTIL VENTOLIN) 2.5 mg /3 mL (0.083 %) nebu 2.5 mg by Nebulization route every six (6) hours as needed for Wheezing. Yes Provider, Historical  
budesonide-formoteroL (SYMBICORT) 160-4.5 mcg/actuation HFAA Take 2 Puffs by inhalation two (2) times a day. Yes Provider, Historical  
polyethylene glycol (MIRALAX) 17 gram packet Take 17 g by mouth daily as needed for Constipation. Yes Provider, Historical  
pantoprazole (PROTONIX) 40 mg tablet Take 40 mg by mouth daily. Yes Provider, Historical  
melatonin 5 mg tablet Take 10 mg by mouth nightly. Yes Provider, Historical  
prazosin (MINIPRESS) 2 mg capsule Take 4 mg by mouth nightly. Yes Provider, Historical  
ziprasidone (GEODON) 40 mg capsule Take 40 mg by mouth two (2) times daily (with meals). Yes Provider, Historical  
FLUoxetine (PROzac) 20 mg capsule Take 60 mg by mouth daily. Yes Provider, Historical  
pediatric multivitamins chewable tablet Take 1 Tab by mouth daily. Yes Provider, Historical  
brinzolamide-brimonidine 1-0.2 % drps Administer 1 Drop to both eyes three (3) times daily. Yes Provider, Historical  
diazePAM (VALIUM) 5 mg tablet Take 5 mg by mouth nightly. Indications: anxious   Yes Provider, Historical  
 
Allergies Allergen Reactions  Hydrocodone Nausea Only  Oxycodone Nausea Only  Percocet [Oxycodone-Acetaminophen] Nausea Only  Vicodin [Hydrocodone-Acetaminophen] Nausea Only Social History Tobacco Use  Smoking status: Not on file Substance Use Topics  Alcohol use: Not on file No family history on file. Current Facility-Administered Medications Medication Dose Route Frequency  methylPREDNISolone (PF) (Solu-MEDROL) injection 80 mg  80 mg IntraVENous Q6H  
 dilTIAZem CD (CARDIZEM CD) capsule 300 mg  300 mg Oral DAILY  polyethylene glycol (MIRALAX) packet 17 g  17 g Oral BID  
 apixaban (ELIQUIS) tablet 5 mg  5 mg Oral Q12H  pantoprazole (PROTONIX) tablet 40 mg  40 mg Oral ACB  sodium chloride (NS) flush 5-40 mL  5-40 mL IntraVENous Q8H  
 nicotine (NICODERM CQ) 21 mg/24 hr patch 1 Patch  1 Patch TransDERmal DAILY  senna (SENOKOT) tablet 17.2 mg  2 Tab Oral QHS  atorvastatin (LIPITOR) tablet 40 mg  40 mg Oral QHS  diazePAM (VALIUM) tablet 2.5 mg  2.5 mg Oral BID  dorzolamide (TRUSOPT) 2 % ophthalmic solution 1 Drop  1 Drop Both Eyes TID  ziprasidone (GEODON) capsule 40 mg  40 mg Oral BID WITH MEALS  
 brimonidine (ALPHAGAN) 0.2 % ophthalmic solution 1 Drop  1 Drop Both Eyes TID  budesonide (PULMICORT) 500 mcg/2 ml nebulizer suspension  500 mcg Nebulization BID RT  
 arformoteroL (BROVANA) neb solution 15 mcg  15 mcg Nebulization BID RT  
 melatonin tablet 9 mg  9 mg Oral QHS  prazosin (MINIPRESS) capsule 4 mg  4 mg Oral QHS  albuterol-ipratropium (DUO-NEB) 2.5 MG-0.5 MG/3 ML  3 mL Nebulization Q4H RT  
 guaiFENesin ER (MUCINEX) tablet 1,200 mg  1,200 mg Oral BID Objective:  
Vital Signs:   
Visit Vitals /64 (BP 1 Location: Right arm, BP Patient Position: Sitting) Pulse (!) 102 Temp 97.9 °F (36.6 °C) Resp 25 Ht 5' 9\" (1.753 m) Wt 62.5 kg (137 lb 12.8 oz) SpO2 95% BMI 20.35 kg/m² O2 Device: Heated, Hi flow nasal cannula O2 Flow Rate (L/min): 40 l/min Temp (24hrs), Av °F (36.7 °C), Min:97.8 °F (36.6 °C), Max:98.4 °F (36.9 °C) Intake/Output:  
Last shift:      No intake/output data recorded. Last 3 shifts:  1901 -  0700 In: 26 [P.O.:460] Out: 650 [Urine:650] Intake/Output Summary (Last 24 hours) at 3/20/2020 3585 Last data filed at 3/20/2020 7703 Gross per 24 hour Intake 240 ml Output  Net 240 ml Physical Exam:  
General:  Awake, alert, on HF, + resp distress Head:  NCAT Eyes:  EOMI, anicteric Nose: Nares clear, HF in place, septum midline Throat: Clear, MM Neck: Trachea midline,supple Lungs:   Diminished, bilateral wheeze, increased WOB Chest wall:  Normal shape, nontender Heart:  RRR Abdomen:   Soft, NT, ND, +bowel sounds Extremities: No edema Pulses: 2+ Skin: Dry, intact, tattoos Lymph nodes: No cervical lymphadenopathy Neurologic: No focal findings, oriented x 3, poor insight into severity of disease Data review:  
 
Recent Results (from the past 24 hour(s)) CBC WITH AUTOMATED DIFF Collection Time: 20  1:45 AM  
Result Value Ref Range WBC 5.4 4.1 - 11.1 K/uL  
 RBC 3.91 (L) 4.10 - 5.70 M/uL  
 HGB 12.6 12.1 - 17.0 g/dL HCT 37.8 36.6 - 50.3 % MCV 96.7 80.0 - 99.0 FL  
 MCH 32.2 26.0 - 34.0 PG  
 MCHC 33.3 30.0 - 36.5 g/dL  
 RDW 11.9 11.5 - 14.5 % PLATELET 901 850 - 220 K/uL MPV 9.2 8.9 - 12.9 FL  
 NRBC 0.0 0  WBC ABSOLUTE NRBC 0.00 0.00 - 0.01 K/uL NEUTROPHILS 91 (H) 32 - 75 % LYMPHOCYTES 4 (L) 12 - 49 % MONOCYTES 5 5 - 13 % EOSINOPHILS 0 0 - 7 % BASOPHILS 0 0 - 1 % IMMATURE GRANULOCYTES 0 0.0 - 0.5 % ABS. NEUTROPHILS 4.9 1.8 - 8.0 K/UL  
 ABS. LYMPHOCYTES 0.2 (L) 0.8 - 3.5 K/UL  
 ABS. MONOCYTES 0.3 0.0 - 1.0 K/UL  
 ABS. EOSINOPHILS 0.0 0.0 - 0.4 K/UL  
 ABS. BASOPHILS 0.0 0.0 - 0.1 K/UL  
 ABS. IMM. GRANS. 0.0 0.00 - 0.04 K/UL  
 DF SMEAR SCANNED    
 RBC COMMENTS NORMOCYTIC, NORMOCHROMIC METABOLIC PANEL, COMPREHENSIVE  
 Collection Time: 03/20/20  1:45 AM  
Result Value Ref Range Sodium 132 (L) 136 - 145 mmol/L Potassium 4.1 3.5 - 5.1 mmol/L Chloride 92 (L) 97 - 108 mmol/L  
 CO2 38 (H) 21 - 32 mmol/L Anion gap 2 (L) 5 - 15 mmol/L Glucose 187 (H) 65 - 100 mg/dL BUN 23 (H) 6 - 20 MG/DL Creatinine 0.92 0.70 - 1.30 MG/DL  
 BUN/Creatinine ratio 25 (H) 12 - 20 GFR est AA >60 >60 ml/min/1.73m2 GFR est non-AA >60 >60 ml/min/1.73m2 Calcium 8.3 (L) 8.5 - 10.1 MG/DL Bilirubin, total 0.6 0.2 - 1.0 MG/DL  
 ALT (SGPT) 34 12 - 78 U/L  
 AST (SGOT) 17 15 - 37 U/L Alk. phosphatase 56 45 - 117 U/L Protein, total 5.6 (L) 6.4 - 8.2 g/dL Albumin 3.1 (L) 3.5 - 5.0 g/dL Globulin 2.5 2.0 - 4.0 g/dL A-G Ratio 1.2 1.1 - 2.2 MAGNESIUM Collection Time: 03/20/20  1:45 AM  
Result Value Ref Range Magnesium 2.1 1.6 - 2.4 mg/dL PHOSPHORUS Collection Time: 03/20/20  1:45 AM  
Result Value Ref Range Phosphorus 3.5 2.6 - 4.7 MG/DL Imaging: 
I have personally reviewed the patients radiographs and have reviewed the reports: CXR 3/18/2020 - hyperinflation, no acute process.   
 
  
Juliet Wallace

## 2020-03-20 NOTE — PROGRESS NOTES
SHIFT CHANGE: 
7:30 AM Report received from Pat Singh, RN & Anthony Lock RN. SBAR, Kardex, Procedure Summary, Intake/Output, MAR, Recent Results, Med Rec Status and Cardiac Rhythm AFIB were discussed. SHIFT SUMMARY: 
9:04 AM  Patient complaining of nausea and abdominal pain. Requesting tramadol. Dr. Jaskaran Padgett notified and orders received. 2:00 PM O2 sats 86% on 40L/70% FIO2. Increased to 80% FIO2 and sats up to 90%. Will monitor. 2:14 PM  Discussed contact & droplet precautions with Maryanne Candelaria and Dr. Jaskaran Padgett. Isolation precautions discontinued. END OF SHIFT REPORT: 
8:23 PM Bedside and Verbal shift change report given to Elvira Uribe RN (oncoming nurse) by Bladimir Kim RN (offgoing nurse). Report included the following information SBAR, Kardex, Procedure Summary, Intake/Output, MAR, Recent Results, Med Rec Status and Cardiac Rhythm NSR.

## 2020-03-21 NOTE — PROGRESS NOTES
SHIFT CHANGE: 
7:30 AM Report received from Rafael Perdue RN. SBAR, Kardex, Procedure Summary, Intake/Output, MAR, Recent Results, Med Rec Status and Cardiac Rhythm NSR were discussed. SHIFT SUMMARY: 
8:45 AM  Patient states he would like to be resuscitated in the event that he is a cardiac or respiratory arrest.  Patient is currently a DNR. Will notify Dr. Shay Stoll. 1:08 PM  Patient would like to received his Valium at 1400 everyday instead of 1600. Dr. Shay Stoll notified and orders received to adjust times. END OF SHIFT REPORT: 
8:07 PM Bedside and Verbal shift change report given to Rafael Perdue RN (oncoming nurse) by Lorraine Dean RN (offgoing nurse). Report included the following information SBAR, Kardex, Procedure Summary, Intake/Output, MAR, Recent Results, Med Rec Status and Cardiac Rhythm NSR/ST.

## 2020-03-21 NOTE — PROGRESS NOTES
attempted to follow up with Mr. Alecia Land on the [place] [address] unit. Mr. Alecia Land was lying in bed and appeared to be resting comfortably. He did not become alert to the 's presence and no family members were present at this time.  will follow up as able and/or needed Ruchi Blandon.  Paging Service: [phone] ([phone])

## 2020-03-21 NOTE — PROGRESS NOTES
[facility] Progress note Name: Kayden Dumont MRN: [id number] : 1950 Hospital: [address] [facility] Date: 3/21/2020 IMPRESSION:  
· Acute on chronic hypoxemic/hypercapnic respiratory failure. Remains on HFNC · Advanced COPD w/ acute exacerbation · Hyponatremia , stable for now · New onset afib 
· HTN 
· Tobacco abuse RECOMMENDATIONS:  
· Remain on continuous HF vs NIV; would continue NIV nightly, discussed w/ him, try to use intermittently during day as well · Rate control with Cardizem per cardiology · Would benefit from trilogy on discharge · Continue scheduled duonebs, pulmicort, brovana, flutter valve · decrease IV steroids to 60 mg q 6 hr 
· Completed 5 day course of Doxy; no additional abx indicated at this time given clear CXR and normal WBC. Suspect elevated LA related to diuresis/contraction alkalosis · Holding Bumex for now given elevated lactic acid and alkalosis on metabolic panel and ABG · Speech eval - functional swallow; aspiration risk due to HF and respiratory status. He is now DNR and diet has been resumed per patient request.  He understands he is still at increased aspiration risk. · Agree with holding SSRI - trend Na · On protonix for GERD 
· On lovenox for DVT ppx · Greatly appreciate assistance of Palliative Care team.  Ongoing discussions with patient and family. He is now DNR, but not yet ready for comfort measures. Patient is critically ill with severe hypoxic, hypercapnic respiratory failure requiring HF oxygen and NIV and is high risk for further decompensation. Subjective:  
 
3/21: On HF 40L and 75% FiO2. Some cough but doesn't expectorate all the way. Complains of neck and and back soreness. Nasal bridge feels tender so he doesn't like wearing the NIV for long. Overall, he states he is feeling better than when he was admitted. Mr. Rocio Fung is a 72yo male w/ history of chronic hypoxemic respiratory failure (on 4L at baseline), COPD and anxiety who presented to the ER on 3/11 w/ complaints of shortness of breath x 2 weeks, wheezing, cough productive of brown sputum, pleuritic chest pain and sore throat. Denies sick contacts or recent travel. Does report frequent coughing while eating/drinking. Is mostly homebound but gets to the [place]  for appointments and was last there on Monday. He is followed by pulmonary at the [place] -- sees Dr. Latoya Brewer. Smokes 1ppd (down from 3ppd) since age 15. 
 
3/11 - CTA chest: emphysematous changes. No asdz. Negative for PE *all cultures NGTD *RVP negative Interval history: Afebrile Sats 95% on 40L HF FiO2 80% BP and HR stable currently Na 132 - stable Bicarb 38 - better 3/18 ABG 7.46/56/81/38 on NIV 24/6 FiO2 60% 3/15 ECHO: EF 55-60%; no valve abnormalities ROS: States he doesn't feel well this morning. Reports nausea. Breathing mostly unchanged. Denies fever or chills. Denies CP. States, \"I want to get better. \" 
 
PMH - COPD, chronic respiratory failure, home oxygen, hypertension Prior to Admission medications Medication Sig Start Date End Date Taking? Authorizing Provider  
predniSONE (DELTASONE) 5 mg tablet Take 5 mg by mouth daily. Yes Provider, Historical  
albuterol (PROVENTIL HFA, VENTOLIN HFA, PROAIR HFA) 90 mcg/actuation inhaler Take 2 Puffs by inhalation every four (4) hours as needed for Wheezing or Shortness of Breath. Yes Provider, Historical  
aspirin delayed-release 81 mg tablet Take 81 mg by mouth daily. Yes Provider, Historical  
atorvastatin (LIPITOR) 80 mg tablet Take 40 mg by mouth nightly. Yes Provider, Historical  
docusate sodium (COLACE) 100 mg capsule Take 100 mg by mouth three (3) times daily as needed for Constipation. Yes Provider, Historical  
senna (SENOKOT) 8.6 mg tablet Take 2 Tabs by mouth nightly.    Yes Provider, Historical  
 psyllium (METAMUCIL) powd Take 1 Dose by mouth two (2) times a day. 2 teaspoons in liquid twice daily    Yes Provider, Historical  
diazePAM (VALIUM) 5 mg tablet Take 2.5 mg by mouth daily. Indications: anxious   Yes Provider, Historical  
cholecalciferol (VITAMIN D3) (1000 Units /25 mcg) tablet Take 2,000 Units by mouth daily. Yes Provider, Historical  
denosumab (PROLIA) 60 mg/mL injection 60 mg by SubCUTAneous route See Admin Instructions. One syringe subq every 6 months. Due 3/13    Yes Provider, Historical  
ipratropium (ATROVENT) 0.02 % soln 0.5 mg by Nebulization route every four (4) hours as needed (shortness of breath). Yes Provider, Historical  
guaiFENesin (ORGANIDIN) 200 mg tablet Take 200 mg by mouth every six (6) hours as needed for Congestion. Yes Provider, Historical  
metoprolol tartrate (LOPRESSOR) 50 mg tablet Take 50 mg by mouth two (2) times a day. Patient takes in morning (0900) and afternoon (1600)   Yes Provider, Historical  
albuterol (PROVENTIL VENTOLIN) 2.5 mg /3 mL (0.083 %) nebu 2.5 mg by Nebulization route every six (6) hours as needed for Wheezing. Yes Provider, Historical  
budesonide-formoteroL (SYMBICORT) 160-4.5 mcg/actuation HFAA Take 2 Puffs by inhalation two (2) times a day. Yes Provider, Historical  
polyethylene glycol (MIRALAX) 17 gram packet Take 17 g by mouth daily as needed for Constipation. Yes Provider, Historical  
pantoprazole (PROTONIX) 40 mg tablet Take 40 mg by mouth daily. Yes Provider, Historical  
melatonin 5 mg tablet Take 10 mg by mouth nightly. Yes Provider, Historical  
prazosin (MINIPRESS) 2 mg capsule Take 4 mg by mouth nightly. Yes Provider, Historical  
ziprasidone (GEODON) 40 mg capsule Take 40 mg by mouth two (2) times daily (with meals). Yes Provider, Historical  
FLUoxetine (PROzac) 20 mg capsule Take 60 mg by mouth daily. Yes Provider, Historical  
pediatric multivitamins chewable tablet Take 1 Tab by mouth daily.    Yes Provider, Historical  
brinzolamide-brimonidine 1-0.2 % drps Administer 1 Drop to both eyes three (3) times daily. Yes Provider, Historical  
diazePAM (VALIUM) 5 mg tablet Take 5 mg by mouth nightly. Indications: anxious   Yes Provider, Historical  
 
Allergies Allergen Reactions  Hydrocodone Nausea Only  Oxycodone Nausea Only  Percocet [Oxycodone-Acetaminophen] Nausea Only  Vicodin [Hydrocodone-Acetaminophen] Nausea Only Social History Tobacco Use  Smoking status: Not on file Substance Use Topics  Alcohol use: Not on file No family history on file. Current Facility-Administered Medications Medication Dose Route Frequency  diazePAM (VALIUM) tablet 2.5 mg  2.5 mg Oral TID  methylPREDNISolone (PF) (Solu-MEDROL) injection 80 mg  80 mg IntraVENous Q6H  
 dilTIAZem CD (CARDIZEM CD) capsule 300 mg  300 mg Oral DAILY  polyethylene glycol (MIRALAX) packet 17 g  17 g Oral BID  
 apixaban (ELIQUIS) tablet 5 mg  5 mg Oral Q12H  pantoprazole (PROTONIX) tablet 40 mg  40 mg Oral ACB  sodium chloride (NS) flush 5-40 mL  5-40 mL IntraVENous Q8H  
 nicotine (NICODERM CQ) 21 mg/24 hr patch 1 Patch  1 Patch TransDERmal DAILY  senna (SENOKOT) tablet 17.2 mg  2 Tab Oral QHS  atorvastatin (LIPITOR) tablet 40 mg  40 mg Oral QHS  dorzolamide (TRUSOPT) 2 % ophthalmic solution 1 Drop  1 Drop Both Eyes TID  ziprasidone (GEODON) capsule 40 mg  40 mg Oral BID WITH MEALS  
 brimonidine (ALPHAGAN) 0.2 % ophthalmic solution 1 Drop  1 Drop Both Eyes TID  budesonide (PULMICORT) 500 mcg/2 ml nebulizer suspension  500 mcg Nebulization BID RT  
 arformoteroL (BROVANA) neb solution 15 mcg  15 mcg Nebulization BID RT  
 melatonin tablet 9 mg  9 mg Oral QHS  prazosin (MINIPRESS) capsule 4 mg  4 mg Oral QHS  albuterol-ipratropium (DUO-NEB) 2.5 MG-0.5 MG/3 ML  3 mL Nebulization Q4H RT  
 guaiFENesin ER (MUCINEX) tablet 1,200 mg  1,200 mg Oral BID Objective: Vital Signs:   
Visit Vitals /56 (BP 1 Location: Right arm, BP Patient Position: At rest) Pulse 80 Temp 97.8 °F (36.6 °C) Resp 20 Ht 5' 9\" (1.753 m) Wt 62.8 kg (138 lb 7.2 oz) SpO2 92% BMI 20.45 kg/m² O2 Device: Hi flow nasal cannula O2 Flow Rate (L/min): 40 l/min Temp (24hrs), Av.7 °F (36.5 °C), Min:97.3 °F (36.3 °C), Max:98.3 °F (36.8 °C) Intake/Output:  
Last shift:      No intake/output data recorded. Last 3 shifts:  1901 -  0700 In: 8580 [P.O.:2520] Out: 5691 [MRRMO:7064] Intake/Output Summary (Last 24 hours) at 3/21/2020 0862 Last data filed at 3/21/2020 3412 Gross per 24 hour Intake 2280 ml Output 4125 ml Net -1845 ml Physical Exam:  
General:  Awake, alert, on HF, + resp distress Head:  NCAT Eyes:  EOMI, anicteric Nose: Nares clear, HF in place, septum midline Throat: Clear, MM Neck: Trachea midline,supple Lungs:   Diminished, bilateral wheeze, increased WOB Chest wall:  Normal shape, nontender Heart:  RRR Abdomen:   Soft, NT, ND, +bowel sounds Extremities: No edema Pulses: 2+ Skin: Dry, intact, tattoos Lymph nodes: No cervical lymphadenopathy Neurologic: No focal findings, oriented x 3, poor insight into severity of disease Data review:  
 
Recent Results (from the past 24 hour(s)) CBC W/O DIFF Collection Time: 20  4:33 AM  
Result Value Ref Range WBC 8.4 4.1 - 11.1 K/uL  
 RBC 4.14 4.10 - 5.70 M/uL  
 HGB 13.6 12.1 - 17.0 g/dL HCT 40.9 36.6 - 50.3 % MCV 98.8 80.0 - 99.0 FL  
 MCH 32.9 26.0 - 34.0 PG  
 MCHC 33.3 30.0 - 36.5 g/dL  
 RDW 11.8 11.5 - 14.5 % PLATELET 625 346 - 455 K/uL MPV 9.3 8.9 - 12.9 FL  
 NRBC 0.0 0  WBC ABSOLUTE NRBC 0.00 0.00 - 0.01 K/uL METABOLIC PANEL, BASIC Collection Time: 20  4:33 AM  
Result Value Ref Range Sodium 133 (L) 136 - 145 mmol/L Potassium 4.4 3.5 - 5.1 mmol/L  Chloride 93 (L) 97 - 108 mmol/L  
 CO2 35 (H) 21 - 32 mmol/L Anion gap 5 5 - 15 mmol/L Glucose 209 (H) 65 - 100 mg/dL BUN 26 (H) 6 - 20 MG/DL Creatinine 0.80 0.70 - 1.30 MG/DL  
 BUN/Creatinine ratio 33 (H) 12 - 20 GFR est AA >60 >60 ml/min/1.73m2 GFR est non-AA >60 >60 ml/min/1.73m2 Calcium 8.4 (L) 8.5 - 10.1 MG/DL Imaging: 
I have personally reviewed the patients radiographs and have reviewed the reports: CXR 3/18/2020 - hyperinflation, no acute process.   
 
  
BALJIT Rodriguez

## 2020-03-21 NOTE — PROGRESS NOTES
Luis Espinosa gabriella [address] 
[address] [facility], [place], [address] [place] [address] 
[phone] Medical Progress Note NAME: Brooklyn Limon :  1950 MRM:  [id number] Date/Time: 3/21/2020  8:20 AM  
 
 
  
Subjective: Chief Complaint:  SOB: moderate to severe, constant, better overall but only slightly, worse with exertion ROS: 
(bold if positive, if negative) Cough SOB/[place]TE[place] Tolerating Diet Objective:  
 
 
Vitals:  
 
 
  
Last 24hrs VS reviewed since prior progress note. Most recent are: 
 
Visit Vitals /56 (BP 1 Location: Right arm, BP Patient Position: At rest) Pulse 80 Temp 97.8 °F (36.6 °C) Resp 20 Ht 5' 9\" (1.753 m) Wt 62.8 kg (138 lb 7.2 oz) SpO2 92% BMI 20.45 kg/m² SpO2 Readings from Last 6 Encounters:  
20 92% O2 Flow Rate (L/min): 40 l/min Intake/Output Summary (Last 24 hours) at 3/21/2020 0820 Last data filed at 3/21/2020 1772 Gross per 24 hour Intake 2280 ml Output 4125 ml Net -1845 ml Exam:  
 
Physical Exam: 
 
Gen:  Well-developed, thin, frail, elderly, in no acute distress HEENT:  Pink conjunctivae, PERRL, hearing intact to voice, moist mucous membranes Neck:  Supple, without masses, thyroid non-tender Resp:  No accessory muscle use, improved air movement, scattered expiratory wheezing Card:  No murmurs, normal S1, S2 without thrills, bruits or peripheral edema Abd:  Soft, non-tender, non-distended, normoactive bowel sounds are present, no palpable organomegaly and no detectable hernias Lymph:  No cervical or inguinal adenopathy Musc:  No cyanosis or clubbing Skin:  No rashes or ulcers, skin turgor is good Neuro:  Cranial nerves are grossly intact, no focal motor weakness, follows commands appropriately Psych:  Good insight, oriented to person, place and time, alert Telemetry reviewed:   normal sinus rhythm Medications Reviewed: (see below) Lab Data Reviewed: (see below) 
 
______________________________________________________________________ Medications:  
 
Current Facility-Administered Medications Medication Dose Route Frequency  traMADoL (ULTRAM) tablet 50 mg  50 mg Oral Q6H PRN  
 diazePAM (VALIUM) tablet 2.5 mg  2.5 mg Oral TID  methylPREDNISolone (PF) (Solu-MEDROL) injection 80 mg  80 mg IntraVENous Q6H  
 dilTIAZem CD (CARDIZEM CD) capsule 300 mg  300 mg Oral DAILY  polyethylene glycol (MIRALAX) packet 17 g  17 g Oral BID  
 apixaban (ELIQUIS) tablet 5 mg  5 mg Oral Q12H  pantoprazole (PROTONIX) tablet 40 mg  40 mg Oral ACB  sodium chloride (NS) flush 5-40 mL  5-40 mL IntraVENous Q8H  
 sodium chloride (NS) flush 5-40 mL  5-40 mL IntraVENous PRN  
 ondansetron (ZOFRAN) injection 4 mg  4 mg IntraVENous Q4H PRN  
 nicotine (NICODERM CQ) 21 mg/24 hr patch 1 Patch  1 Patch TransDERmal DAILY  senna (SENOKOT) tablet 17.2 mg  2 Tab Oral QHS  docusate sodium (COLACE) capsule 100 mg  100 mg Oral TID PRN  
 atorvastatin (LIPITOR) tablet 40 mg  40 mg Oral QHS  acetaminophen (TYLENOL) tablet 650 mg  650 mg Oral Q6H PRN  
 dorzolamide (TRUSOPT) 2 % ophthalmic solution 1 Drop  1 Drop Both Eyes TID  ziprasidone (GEODON) capsule 40 mg  40 mg Oral BID WITH MEALS  
 brimonidine (ALPHAGAN) 0.2 % ophthalmic solution 1 Drop  1 Drop Both Eyes TID  budesonide (PULMICORT) 500 mcg/2 ml nebulizer suspension  500 mcg Nebulization BID RT  
 arformoteroL (BROVANA) neb solution 15 mcg  15 mcg Nebulization BID RT  
 melatonin tablet 9 mg  9 mg Oral QHS  prazosin (MINIPRESS) capsule 4 mg  4 mg Oral QHS  albuterol-ipratropium (DUO-NEB) 2.5 MG-0.5 MG/3 ML  3 mL Nebulization Q4H RT  
 guaiFENesin ER (MUCINEX) tablet 1,200 mg  1,200 mg Oral BID  sodium chloride (NS) flush 5-10 mL  5-10 mL IntraVENous PRN Lab Review:  
 
Recent Labs  
  03/21/20 
0433 03/20/20 
0145 03/19/20 
0023 WBC 8.4 5.4 7.8 HGB 13.6 12.6 13.2 HCT 40.9 37.8 38.9  172 174 Recent Labs  
  03/21/20 
0433 03/20/20 
0145 03/19/20 
0023 03/18/20 
1208 * 132* 132* 131* K 4.4 4.1 4.0 4.3 CL 93* 92* 89* 89* CO2 35* 38* 37* 41* * 187* 178* 156* BUN 26* 23* 22* 19  
CREA 0.80 0.92 0.82 0.78  
CA 8.4* 8.3* 8.6 8.4* MG  --  2.1 2.1  --   
PHOS  --  3.5 4.6 2.8 ALB  --  3.1* 3.2* 3.3* TBILI  --  0.6  --   --   
SGOT  --  17  --   --   
ALT  --  34  --   -- No results found for: [facility] Recent Labs  
  03/18/20 
1010 PH 7.46* PCO2 56* PO2 81 HCO3 38* FIO2 60 No results for input(s): INR, INREXT, INREXT in the last 72 hours. No results found for: SDES Lab Results Component Value Date/Time Culture result: NO GROWTH 6 DAYS 03/11/2020 10:24 PM  
 Culture result: NO GROWTH 6 DAYS 03/11/2020 10:09 PM  
 
 
 
  
Assessment:  
 
Principal Problem: COPD with acute exacerbation ([facility] [address] (3/20/2020) Active Problems: 
  Acute on chronic respiratory failure with hypoxia ([facility] [address] (3/11/2020) PAF (paroxysmal atrial fibrillation) ([facility] [address] (3/20/2020) HTN (hypertension) (3/20/2020) Anxiety (3/20/2020) Plan:  
 
Principal Problem: COPD with acute exacerbation ([address] (3/20/2020) - continue nebs, steroids, etc. 
 - not improving much Active Problems: 
  Acute on chronic respiratory failure with hypoxia ([facility] [address] (3/11/2020) - NIPPV at night, hiflow during day for now; currently on 75% at 40 LPM 
 - cannot go home on this regimen 
 - weaning as able but if unable to come off hiflow, he will consider hospice - Palliative Care to continue working with him PAF (paroxysmal atrial fibrillation) ([facility] [address] (3/20/2020) - continue diltiazem HTN (hypertension) (3/20/2020) - BP okay on meds as above Anxiety (3/20/2020) - continue psych meds Total time spent in patient care: 25 minutes Care Plan discussed with: Patient and Nursing Staff Discussed:  Code Status, Care Plan and D/C Planning Prophylaxis:  Eliquis Disposition:  TBD, may consider hospice 
        
___________________________________________________ Attending Physician: Tommy Campbell MD

## 2020-03-21 NOTE — PROGRESS NOTES
Bedside and Verbal shift change report given to Rupal Salas (oncoming nurse) by Claudy Marshall (offgoing nurse). Report included the following information SBAR, Kardex, ED Summary, Procedure Summary, Intake/Output, MAR, Accordion, Recent Results, Med Rec Status and Cardiac Rhythm NSR.

## 2020-03-22 NOTE — PROGRESS NOTES
Bedside and Verbal shift change report given to Michael Langley (oncoming nurse) by Boris Alvarez (offgoing nurse). Report included the following information SBAR, Kardex, ED Summary, Intake/Output, MAR, Accordion, Recent Results, Med Rec Status and Cardiac Rhythm Sinus Arrythmia . 2100: Administered scheduled medication. Gave patient break from BIPAP. Hi flow NC applied. 0000: Re-applied BIPAP. Will continue to monitor. 0450: Blood drawn and sent to lab to hold. 8125: Pt off BIPAP. Back on HI flow.

## 2020-03-22 NOTE — PROGRESS NOTES
SHIFT CHANGE: 
8:08 AM Report received from Marine Wagner RN. SBAR, Kardex, Procedure Summary, Intake/Output, MAR, Recent Results, Med Rec Status and Cardiac Rhythm NSR/ST were discussed. SHIFT SUMMARY: 
 
 
 
END OF SHIFT REPORT: 
7:48 PM Bedside and Verbal shift change report given to Marine Wagner RN (oncoming nurse) by Clementine Joshi RN (offgoing nurse). Report included the following information SBAR, Kardex, Procedure Summary, Intake/Output, MAR, Recent Results, Med Rec Status and Cardiac Rhythm NSR/ST.

## 2020-03-22 NOTE — PROGRESS NOTES
Luis Espinosa gabriella [place] 81 
[address] 
[phone] Medical Progress Note NAME: Jaime Mccormick :  1950 MRM:  [id number] Date/Time: 3/22/2020  8:19 AM  
 
 
  
Subjective: Chief Complaint:  SOB: moderate to severe, constant, worse with exertion, only slightly improved overall. Yesterday he told Nursing he wanted to be resuscitated. I spent an extended period of time discussing with him today the severity of his lung disease and the difficulty we are having weaning his oxygen down. He acted as if this was the first time he had ever heard this news ROS: 
(bold if positive, if negative) Cough SOB/ARTEAG[place] Tolerating Diet Objective:  
 
 
Vitals:  
 
 
  
Last 24hrs VS reviewed since prior progress note. Most recent are: 
 
Visit Vitals /58 (BP 1 Location: Right arm, BP Patient Position: At rest) Pulse 83 Temp 98.2 °F (36.8 °C) Resp 21 Ht 5' 9\" (1.753 m) Wt 65 kg (143 lb 3.2 oz) SpO2 94% BMI 21.15 kg/m² SpO2 Readings from Last 6 Encounters:  
20 94% O2 Flow Rate (L/min): 40 l/min Intake/Output Summary (Last 24 hours) at 3/22/2020 0820 Last data filed at 3/22/2020 0778 Gross per 24 hour Intake 1680 ml Output 2350 ml Net -670 ml Exam:  
 
Physical Exam: 
 
Gen:  Well-developed, thin, frail, elderly, in no acute distress HEENT:  Pink conjunctivae, PERRL, hearing intact to voice, moist mucous membranes Neck:  Supple, without masses, thyroid non-tender Resp:  No accessory muscle use, decreased air movement, diffuse wheezing noted Card:  No murmurs, normal S1, S2 without thrills, bruits or peripheral edema Abd:  Soft, non-tender, non-distended, normoactive bowel sounds are present, no palpable organomegaly and no detectable hernias Lymph:  No cervical or inguinal adenopathy Musc:  No cyanosis or clubbing Skin:  No rashes or ulcers, skin turgor is good Neuro:  Cranial nerves are grossly intact, no focal motor weakness, follows commands appropriately Psych:  Good insight, oriented to person, place and time, alert Telemetry reviewed:   normal sinus rhythm Medications Reviewed: (see below) Lab Data Reviewed: (see below) 
 
______________________________________________________________________ Medications:  
 
Current Facility-Administered Medications Medication Dose Route Frequency  methylPREDNISolone (PF) (Solu-MEDROL) injection 60 mg  60 mg IntraVENous Q6H  
 diazePAM (VALIUM) tablet 2.5 mg  2.5 mg Oral Q8H  
 traMADoL (ULTRAM) tablet 50 mg  50 mg Oral Q6H PRN  
 dilTIAZem CD (CARDIZEM CD) capsule 300 mg  300 mg Oral DAILY  polyethylene glycol (MIRALAX) packet 17 g  17 g Oral BID  
 apixaban (ELIQUIS) tablet 5 mg  5 mg Oral Q12H  pantoprazole (PROTONIX) tablet 40 mg  40 mg Oral ACB  sodium chloride (NS) flush 5-40 mL  5-40 mL IntraVENous Q8H  
 sodium chloride (NS) flush 5-40 mL  5-40 mL IntraVENous PRN  
 ondansetron (ZOFRAN) injection 4 mg  4 mg IntraVENous Q4H PRN  
 nicotine (NICODERM CQ) 21 mg/24 hr patch 1 Patch  1 Patch TransDERmal DAILY  senna (SENOKOT) tablet 17.2 mg  2 Tab Oral QHS  docusate sodium (COLACE) capsule 100 mg  100 mg Oral TID PRN  
 atorvastatin (LIPITOR) tablet 40 mg  40 mg Oral QHS  acetaminophen (TYLENOL) tablet 650 mg  650 mg Oral Q6H PRN  
 dorzolamide (TRUSOPT) 2 % ophthalmic solution 1 Drop  1 Drop Both Eyes TID  ziprasidone (GEODON) capsule 40 mg  40 mg Oral BID WITH MEALS  
 brimonidine (ALPHAGAN) 0.2 % ophthalmic solution 1 Drop  1 Drop Both Eyes TID  budesonide (PULMICORT) 500 mcg/2 ml nebulizer suspension  500 mcg Nebulization BID RT  
 arformoteroL (BROVANA) neb solution 15 mcg  15 mcg Nebulization BID RT  
 melatonin tablet 9 mg  9 mg Oral QHS  prazosin (MINIPRESS) capsule 4 mg  4 mg Oral QHS  albuterol-ipratropium (DUO-NEB) 2.5 MG-0.5 MG/3 ML  3 mL Nebulization Q4H RT  
 guaiFENesin ER (MUCINEX) tablet 1,200 mg  1,200 mg Oral BID  sodium chloride (NS) flush 5-10 mL  5-10 mL IntraVENous PRN Lab Review:  
 
Recent Labs  
  03/21/20 
0433 03/20/20 
0145 WBC 8.4 5.4 HGB 13.6 12.6 HCT 40.9 37.8  172 Recent Labs  
  03/21/20 
0433 03/20/20 
0145 * 132* K 4.4 4.1 CL 93* 92* CO2 35* 38* * 187* BUN 26* 23* CREA 0.80 0.92  
CA 8.4* 8.3*  
MG  --  2.1 PHOS  --  3.5 ALB  --  3.1* TBILI  --  0.6 SGOT  --  17 ALT  --  34 No results found for: [facility] No results for input(s): PH, PCO2, PO2, HCO3, FIO2 in the last 72 hours. No results for input(s): INR, INREXT, INREXT in the last 72 hours. No results found for: SDES Lab Results Component Value Date/Time Culture result: NO GROWTH 6 DAYS 03/11/2020 10:24 PM  
 Culture result: NO GROWTH 6 DAYS 03/11/2020 10:09 PM  
 
 
 
  
Assessment:  
 
Principal Problem: COPD with acute exacerbation ([facility] [address] (3/20/2020) Active Problems: 
  Acute on chronic respiratory failure with hypoxia ([facility] [address] (3/11/2020) PAF (paroxysmal atrial fibrillation) ([facility] [address] (3/20/2020) HTN (hypertension) (3/20/2020) Anxiety (3/20/2020) Plan:  
 
Principal Problem: COPD with acute exacerbation ([address] (3/20/2020) - continue nebs, steroids, etc. 
 - not improving much Active Problems: 
  Acute on chronic respiratory failure with hypoxia ([facility] [address] (3/11/2020) - NIPPV at night, hiflow during day for now; currently on 78% at 40 LPM 
 - cannot go home on this regimen 
 - he is not ready for hospice at this point 
 - he reversed his code status and told me he never wanted to be  DNR 
 - I explained that if he were to be intubated, in his current state of health, there is virtually no chance he could come off the ventilator, would almost certainly require a tracheostomy and even then might require a ventilator with the tracheostomy - he is in denial about the severity of his respiratory disease, I tried to clarify this for him but he continued to ask me the same questions over and over again despite my answers and continued to state this was the first time he had been told this, which is obviously not the case 
 - hopefully Pulmonary and Palliative Care can continue to work with him to educate him about the severity of his disease and the fact that we have nothing else we can do to aid him in recovering and that it will take time and he may not improve; I emphasized all of this to him repeatedly over the course of our discussion but he has not absorbed this PAF (paroxysmal atrial fibrillation) ([facility] [address] (3/20/2020) - continue diltiazem HTN (hypertension) (3/20/2020) - BP okay on meds as above Anxiety (3/20/2020) - continue psych meds, I think anxiety is playing a large part in his inability to accept the facts of his medical condition 
 - consider Psychiatry consult if Pulmonary and Palliative Care cannot make any headway with him Total time spent in patient care: 35 minutes Care Plan discussed with: Patient, Nursing Staff and >50% of time spent in counseling and coordination of care Discussed:  Code Status, Care Plan and D/C Planning Prophylaxis:  Eliquis Disposition:  TBD, may consider hospice 
        
___________________________________________________ Attending Physician: Chaka Beard MD

## 2020-03-22 NOTE — PROGRESS NOTES
Responded to request from nurse for a follow up visit with Mr. Obrien January on Progressive Care. He appeared to be sleeping, unable to visit at this time. Chaplains will follow as able and/or needed. Visited by: Isa Strange MS., [address] ([id number])

## 2020-03-23 NOTE — PROGRESS NOTES
Palliative Medicine Consult Gualberto: [phone] Patient Name: Ruben Franks YOB: 1950 Date of Initial Consult: 3/13/2020 Reason for Consult: ES Disease Requesting Provider: Ofelia SMILEY Primary Care Physician: None SUMMARY:  
Ruben Franks is a 81 y.o. with a past history of COPD/GOLD3 on O2 4L continuous, +active tobacco use, HLD, HTN, Anxiety who was admitted on 3/11/2020 from home with a diagnosis of Acute Hypercapnic Respiratory Failure. Current medical issues leading to Palliative Medicine involvement include: GOC discussion in setting of ES COPD. Patient presented to Er w/ cc of worsening SOB x 2 weeks, with associated wheezing, productive cough and pain. Patient also endorsed a cough secondary to cough as well as episode of diarrhea earlier day of admission. In ER patient observed to be thin, chronically ill appearing and in respiratory distress, he had + temp 101.6, labs w/ Na 129, creatinine and albumin WNL. Patient required 4L O2. Respiratory panel returned negative. Patient started on empiric Abx and was admitted. Course of hospitalization: O2 needs increased requiring bipap. 3/13 CXR mild interstitial pulmonary edema and small right pleural effusion, ABG abnormal, CO2 83. Soon after admission, developed  Intermittent Afib with RVR, cardiology following, requiring active adjustments to cardiac meds. PALLIATIVE DIAGNOSES:  
1. Advanced care planning discussion 2. Goals of care discussion 3. DNR discussion 4. Shortness of breath 5. Anxiety PLAN:  
1. Reviewed chart prior to visit for updated information 2. Met with patient, assessed his understanding of his health, advanced COPD and little progress over the past couple of days, as evidenced by continued need for high flow O2 and bipap prn. 
3. We also talked about decreased activity level, that patient not able to transfer to commode without o2 sats dropping in 80s. 4. Advanced Care planning discussion- patient stated he completed AMD at [facility], however he stated that he might want to complete a new one to include his other daughter Laurie Ramos as a decision maker. Palliative team will follow up with patient next week to complete AMD. 5. We discussed possible Hospice services, patient not interested at this time 6. Goals of care discussion-Patient stated he would prefer to be discharged home, but stated he is willing to go to SNF for therapy if it will help him. 7.  
8. Shortness of breath-inpatient team currently managing, continues onhigh flow or bipap, O2 saturation drops when transfers to [facility]. Nehal Garcia 9. Anxiety-Uncontrolled, patient stated afternoon is having increased anxiety and that it it caused increased sOB. Patient stated that he had been on diazepam TID. I spoke with attending, gave a new order for Diazepam 2.5mg TID. 10.  
11.  Initial consult note routed to primary continuity provider and/or primary health care team members 12. Communicated plan of care with: Palliative IDT, [facility] Team, dr. Barbara Tinsley GOALS OF CARE / TREATMENT PREFERENCES:  
 
GOALS OF CARE: continue with full restorative tx and interventions at this time Patient/Health Care Proxy Stated Goals: Prolong life TREATMENT PREFERENCES:  
Code Status: Full Code Advance Care Planning: 
[x] The [facility] Interdisciplinary Team has updated the ACP Navigator with Milagro and Patient Capacity Advance Care Planning 3/14/2020 Patient's Healthcare Decision Maker is: Legal Next of Kin Confirm Advance Directive None Patient Would Like to Complete Advance Directive Unable Medical Interventions: Full interventions Other Instructions: Other: As far as possible, the palliative care team has discussed with patient / health care proxy about goals of care / treatment preferences for patient.  
 
 HISTORY:  
 
 History obtained from: medical records/nurse/patient CHIEF COMPLAINT: still wants to go home HPI/SUBJECTIVE: The patient is:  
[x] Verbal and participatory [] Non-participatory due to:  
+sob, + anxiety 3/13- BiPAP continuously 3/17- requiring combination of  High Flow 50L  and BiPAP (mostly at night) 3/18- Slight increased O2 needs, using BiPAP more today, Hi flow 60 l/min 3/19 patient feels like his breathing is maybe a little better. Remains on high flow nasal cannula and intermittently BiPAP. 3/20- using high flow O2, desats when uo to [facility] Clinical Pain Assessment (nonverbal scale for severity on nonverbal patients):  
Clinical Pain Assessment Severity: 0 Location: denied pain Character: denied pain Duration: denied pain Effect: denied pain Factors: denied pain Frequency: denied pain Activity (Movement): Lying quietly, normal position Duration: for how long has pt been experiencing pain (e.g., 2 days, 1 month, years) Frequency: how often pain is an issue (e.g., several times per day, once every few days, constant) FUNCTIONAL ASSESSMENT:  
 
Palliative Performance Scale (PPS): PPS: 40 PSYCHOSOCIAL/SPIRITUAL SCREENING:  
 
Palliative IDT has assessed this patient for cultural preferences / practices and a referral made as appropriate to needs (Cultural Services, Patient Advocacy, Ethics, etc.) Any spiritual / Yazdanism concerns: 
[] Yes /  [x] No 
 
Caregiver Burnout: 
[] Yes /  [] No /  [x] No Caregiver Present Anticipatory grief assessment:  
[x] Normal  / [] Maladaptive ESAS Anxiety: Anxiety: 4 ESAS Depression:    
 
 
 REVIEW OF SYSTEMS:  
 
Positive and pertinent negative findings in ROS are noted above in HPI. The following systems were [x] reviewed / [] unable to be reviewed as noted in HPI Other findings are noted below.  
Systems: constitutional, ears/nose/mouth/throat, respiratory, gastrointestinal, genitourinary, musculoskeletal, integumentary, neurologic, psychiatric, endocrine. Positive findings noted below. Modified ESAS Completed by: provider Fatigue: 5 Drowsiness: 0 Pain: 0 Anxiety: 4 Anorexia: 5 Dyspnea: 5 Stool Occurrence(s): 1 PHYSICAL EXAM:  
 
From RN flowsheet: 
Wt Readings from Last 3 Encounters:  
03/22/20 143 lb 3.2 oz (65 kg) Blood pressure 118/57, pulse 79, temperature 97.4 °F (36.3 °C), resp. rate 15, height 5' 9\" (1.753 m), weight 143 lb 3.2 oz (65 kg), SpO2 98 %. Pain Scale 1: Numeric (0 - 10) Pain Intensity 1: 8 Pain Onset 1: chronic Pain Location 1: Back, Head, Shoulder, Leg 
Pain Orientation 1: Anterior Pain Description 1: Aching, Cramping Pain Intervention(s) 1: Medication (see MAR) Last bowel movement, if known:  
 
Constitutional: thin, frail, chronically ill appearing, oriented and alert. Eyes: pupils equal, anicteric ENMT: High flow in place Cardiovascular: regular rhythm, distal pulses intact Respiratory: mild labor as seen by accessory muscle use, high flow nasal cannula in place Gastrointestinal: soft non-tender, +bowel sounds Musculoskeletal: no deformity, no tenderness to palpation Skin: warm, dry Neurologic: fatigued,, able to following commands, weak, moving all extremities Psychiatric:unable denies, appropriate affect HISTORY:  
 
Principal Problem: COPD with acute exacerbation ([facility] [address] (3/20/2020) Active Problems: 
  Acute on chronic respiratory failure with hypoxia ([facility] [address] (3/11/2020) PAF (paroxysmal atrial fibrillation) ([facility] [address] (3/20/2020) HTN (hypertension) (3/20/2020) Anxiety (3/20/2020) No past medical history on file. No past surgical history on file. No family history on file. History reviewed, no pertinent family history. Social History Tobacco Use  Smoking status: Not on file Substance Use Topics  Alcohol use: Not on file Allergies Allergen Reactions  Hydrocodone Nausea Only  Oxycodone Nausea Only  Percocet [Oxycodone-Acetaminophen] Nausea Only  Vicodin [Hydrocodone-Acetaminophen] Nausea Only Current Facility-Administered Medications Medication Dose Route Frequency  methylPREDNISolone (PF) (Solu-MEDROL) injection 60 mg  60 mg IntraVENous Q6H  
 diazePAM (VALIUM) tablet 2.5 mg  2.5 mg Oral Q8H  
 traMADoL (ULTRAM) tablet 50 mg  50 mg Oral Q6H PRN  
 dilTIAZem CD (CARDIZEM CD) capsule 300 mg  300 mg Oral DAILY  polyethylene glycol (MIRALAX) packet 17 g  17 g Oral BID  
 apixaban (ELIQUIS) tablet 5 mg  5 mg Oral Q12H  pantoprazole (PROTONIX) tablet 40 mg  40 mg Oral ACB  sodium chloride (NS) flush 5-40 mL  5-40 mL IntraVENous Q8H  
 sodium chloride (NS) flush 5-40 mL  5-40 mL IntraVENous PRN  
 ondansetron (ZOFRAN) injection 4 mg  4 mg IntraVENous Q4H PRN  
 nicotine (NICODERM CQ) 21 mg/24 hr patch 1 Patch  1 Patch TransDERmal DAILY  senna (SENOKOT) tablet 17.2 mg  2 Tab Oral QHS  docusate sodium (COLACE) capsule 100 mg  100 mg Oral TID PRN  
 atorvastatin (LIPITOR) tablet 40 mg  40 mg Oral QHS  acetaminophen (TYLENOL) tablet 650 mg  650 mg Oral Q6H PRN  
 dorzolamide (TRUSOPT) 2 % ophthalmic solution 1 Drop  1 Drop Both Eyes TID  ziprasidone (GEODON) capsule 40 mg  40 mg Oral BID WITH MEALS  
 brimonidine (ALPHAGAN) 0.2 % ophthalmic solution 1 Drop  1 Drop Both Eyes TID  budesonide (PULMICORT) 500 mcg/2 ml nebulizer suspension  500 mcg Nebulization BID RT  
 arformoteroL (BROVANA) neb solution 15 mcg  15 mcg Nebulization BID RT  
 melatonin tablet 9 mg  9 mg Oral QHS  prazosin (MINIPRESS) capsule 4 mg  4 mg Oral QHS  albuterol-ipratropium (DUO-NEB) 2.5 MG-0.5 MG/3 ML  3 mL Nebulization Q4H RT  
 guaiFENesin ER (MUCINEX) tablet 1,200 mg  1,200 mg Oral BID  sodium chloride (NS) flush 5-10 mL  5-10 mL IntraVENous PRN  
 
 
 
 LAB AND IMAGING FINDINGS:  
 
Lab Results Component Value Date/Time WBC 8.4 03/21/2020 04:33 AM  
 HGB 13.6 03/21/2020 04:33 AM  
 PLATELET 243 58/10/3237 04:33 AM  
 
Lab Results Component Value Date/Time Sodium 133 (L) 03/21/2020 04:33 AM  
 Potassium 4.4 03/21/2020 04:33 AM  
 Chloride 93 (L) 03/21/2020 04:33 AM  
 CO2 35 (H) 03/21/2020 04:33 AM  
 BUN 26 (H) 03/21/2020 04:33 AM  
 Creatinine 0.80 03/21/2020 04:33 AM  
 Calcium 8.4 (L) 03/21/2020 04:33 AM  
 Magnesium 2.1 03/20/2020 01:45 AM  
 Phosphorus 3.5 03/20/2020 01:45 AM  
  
Lab Results Component Value Date/Time AST (SGOT) 17 03/20/2020 01:45 AM  
 Alk. phosphatase 56 03/20/2020 01:45 AM  
 Protein, total 5.6 (L) 03/20/2020 01:45 AM  
 Albumin 3.1 (L) 03/20/2020 01:45 AM  
 Globulin 2.5 03/20/2020 01:45 AM  
 
No results found for: INR, PTMR, PTP, PT1, PT2, APTT, INREXT, INREXT No results found for: IRON, FE, TIBC, IBCT, PSAT, FERR Lab Results Component Value Date/Time pH 7.46 (H) 03/18/2020 10:10 AM  
 PCO2 56 (H) 03/18/2020 10:10 AM  
 PO2 81 03/18/2020 10:10 AM  
 
No components found for: [place] No results found for: CPK, CKMB Total time: 35min Counseling / coordination time, spent as noted above: 30 min 
> 50% counseling / coordination?: yes Prolonged service was provided for  []30 min   []75 min in face to face time in the presence of the patient, spent as noted above. Time Start:  
Time End:  
Note: this can only be billed with 54965 (initial) or 95737 (follow up). If multiple start / stop times, list each separately.

## 2020-03-23 NOTE — PROGRESS NOTES
Shift Change: 
Bedside and Verbal shift change report given to Ryann Crabtree RN (oncoming nurse) by Ashlee Eddy (offgoing nurse). Report included the following information SBAR, Kardex and Quality Measures. Shift Summary: 
2200: Pt called out and stated \"I Fell\" 2 RNs went into the room and pt was sitting in on the side of the bed with urine covering the floor in front of him. Stated he slipped and hurt his back then hurt his knee. Assessed pt no new injuries noted from fall. Called Dr Ruth Castillo to make her aware, just to monitor for now. Post Fall Documentation Deidre Soliser unwitnessed fall occurred on 3/23/20 at 2200. The answers to the following questions summarize the fall: In the patient's own words,: 
· What were you attempting to do when you fell? Using urinal at bedside · Do you know why you fell? \"I think I can do more than I can\" · Do you have any pain/discomfort or any other complaints? \"I hurt my right knee and my back\" · Which part of your body made contact with the floor or other object? Back and knee Nurse: 
? Was this an assisted fall? no 
? Was fall witnessed? No 
? If witnessed, what part of the body made contact with the floor or other object? N/a 
? Patients mental status after the fall/when found: Alert and oriented ? Any apparent injury:  No apparent injury ? Immediate interventions for injury/suspected injury? No interventions needed ? Patient assisted back to bed? No assistance needed/moved self to bed 
? Name of provider notified and time, any comments? Ruth Castillo 1703, monitor for now ? Name of family member notified and time: none Immediate VS and physical assessment documented in flow sheets. Neuro assessment every hour x 4 (for potential head injury or unwitnessed fall) documented in flow sheets. Jh Jasso End of Shift Report: 
Bedside and Verbal shift change report [facility] Ashlee Edd[facility] by Zenobia Jimenez RN (offgoing nurse). Report included the following information SBAR, Kardex and Quality Measures.

## 2020-03-23 NOTE — PROGRESS NOTES
Visit was a follow up visit at the [facility] unit in response to a request from pt's nurse. Pt, Alen Loco,  was siting in his bed, he indicated that he had difficulty breathing. Pt's nurse walked in and out to check on patient during visit. Joey Rocha He shared his jade stories, some of his jade journeys. Pt stated that he got baptized about five years ago, though hie's been a Anabaptism all his life. Family suport is also very important for coping. He has three grand children and a great granchild.  offered supportive presence and empathetic listening. Pt's request for prayer was offered. Alen Loco also indicated  the need for Bible, large print.  was going to bring him a New Testament to see if that works. Spiritual care will keep following up as needed. Visited by: Rolf Bentley. To sakina berg: [phone] ([phone])

## 2020-03-23 NOTE — PROGRESS NOTES
[facility] [address] 
[address] [place] [address] 
[phone] Medical Progress Note NAME: Danny Banegas :  1950 MRM:  [id number] Date/Time of service: 3/23/2020  9:10 AM 
 
  
Subjective: Chief Complaint:  Patient was personally seen and examined by me during this time period. Chart reviewed. Still very dyspneic. Requiring 70% hi-flow O2 and bipap Objective:  
 
 
Vitals:  
 
 
Last 24hrs VS reviewed since prior progress note. Most recent are: 
 
Visit Vitals /67 (BP 1 Location: Right arm, BP Patient Position: Head of bed elevated (Comment degrees)) Pulse 97 Temp 97.6 °F (36.4 °C) Resp 25 Ht 5' 9\" (1.753 m) Wt 66.4 kg (146 lb 4.8 oz) SpO2 96% BMI 21.60 kg/m² SpO2 Readings from Last 6 Encounters:  
20 96% O2 Flow Rate (L/min): 45 l/min Intake/Output Summary (Last 24 hours) at 3/23/2020 4951 Last data filed at 3/23/2020 7463 Gross per 24 hour Intake 2520 ml Output 1851 ml Net 669 ml Exam:  
 
Physical Exam: 
 
Gen:  Disheveled, ill-appearing, mild distress HEENT:  Pink conjunctivae, PERRL, hearing intact to voice, moist mucous membranes Neck:  Supple, without masses, thyroid non-tender Resp:  some accessory muscle use, prolonged expiration, bilateral wheezing Card:  No murmurs, normal S1, S2 without thrills, bruits or peripheral edema Abd:  Soft, non-tender, non-distended, normoactive bowel sounds are present Musc:  No cyanosis or clubbing Skin:  No rashes Neuro:  Cranial nerves 3-12 are grossly intact, follows commands appropriately Psych:  fair insight, oriented to person, place and time, alert Medications Reviewed: (see below) Lab Data Reviewed: (see below) 
 
______________________________________________________________________ Medications:  
 
Current Facility-Administered Medications Medication Dose Route Frequency  methylPREDNISolone (PF) (Solu-MEDROL) injection 60 mg  60 mg IntraVENous Q6H  
 diazePAM (VALIUM) tablet 2.5 mg  2.5 mg Oral Q8H  
 traMADoL (ULTRAM) tablet 50 mg  50 mg Oral Q6H PRN  
 dilTIAZem CD (CARDIZEM CD) capsule 300 mg  300 mg Oral DAILY  polyethylene glycol (MIRALAX) packet 17 g  17 g Oral BID  
 apixaban (ELIQUIS) tablet 5 mg  5 mg Oral Q12H  pantoprazole (PROTONIX) tablet 40 mg  40 mg Oral ACB  sodium chloride (NS) flush 5-40 mL  5-40 mL IntraVENous Q8H  
 sodium chloride (NS) flush 5-40 mL  5-40 mL IntraVENous PRN  
 ondansetron (ZOFRAN) injection 4 mg  4 mg IntraVENous Q4H PRN  
 nicotine (NICODERM CQ) 21 mg/24 hr patch 1 Patch  1 Patch TransDERmal DAILY  senna (SENOKOT) tablet 17.2 mg  2 Tab Oral QHS  docusate sodium (COLACE) capsule 100 mg  100 mg Oral TID PRN  
 atorvastatin (LIPITOR) tablet 40 mg  40 mg Oral QHS  acetaminophen (TYLENOL) tablet 650 mg  650 mg Oral Q6H PRN  
 dorzolamide (TRUSOPT) 2 % ophthalmic solution 1 Drop  1 Drop Both Eyes TID  ziprasidone (GEODON) capsule 40 mg  40 mg Oral BID WITH MEALS  
 brimonidine (ALPHAGAN) 0.2 % ophthalmic solution 1 Drop  1 Drop Both Eyes TID  budesonide (PULMICORT) 500 mcg/2 ml nebulizer suspension  500 mcg Nebulization BID RT  
 arformoteroL (BROVANA) neb solution 15 mcg  15 mcg Nebulization BID RT  
 melatonin tablet 9 mg  9 mg Oral QHS  prazosin (MINIPRESS) capsule 4 mg  4 mg Oral QHS  albuterol-ipratropium (DUO-NEB) 2.5 MG-0.5 MG/3 ML  3 mL Nebulization Q4H RT  
 guaiFENesin ER (MUCINEX) tablet 1,200 mg  1,200 mg Oral BID  sodium chloride (NS) flush 5-10 mL  5-10 mL IntraVENous PRN Lab Review:  
 
Recent Labs  
  03/23/20 
0730 03/21/20 
0428 WBC 11.1 8.4 HGB 13.0 13.6 HCT 39.3 40.9  188 Recent Labs  
  03/23/20 
0730 03/21/20 
0314 * 133* K 4.6 4.4  
CL 93* 93* CO2 33* 35* * 209* BUN 22* 26* CREA 0.83 0.80 CA 8.6 8.4* MG 2.1  --   
 PHOS 4.0  -- No results found for: Tali Linder Assessment / Plan:  
 
Principal Problem: 
 
80 yo hx of HTN, COPD on 4L, anxiety, presented w/ resp failure, hypoxia, COPD. Complicated by new afib 1) Acute on chronic resp failure/hypoxia: now requiring hi-flow O2 and bipap prn. At baseline, on 4L O2. Likely due to end stage COPD. Patient declined hospice. He wants to be a Full Code. Will cont to wean O2 if tolerated. Might need LTC vs pulm rehab 2) COPD with acute exacerbation: minimal improvement. Likely has end stage dz. Cont IV steroids, nebs, incentive spirometry. Pulm following 3) New-onset afib: due to COPD. Cont Dilt, eliquis. Cards was following 4) HTN: cont dilt 5) Anxiety: cont valium, geodon Code: Full Total time spent with patient: 40 min **I personally saw and examined the patient during this time period** Care Plan discussed with: Patient, nursing Discussed:  Care Plan Prophylaxis:  eliquis Disposition:  LTC 
        
___________________________________________________ Attending Physician: Mikhail Coello MD

## 2020-03-23 NOTE — PROGRESS NOTES
Bedside and Verbal shift change report given to [facility] (oncoming nurse) by Cris Amado (offgoing nurse). Report included the following information SBAR, Kardex, ED Summary, Procedure Summary, Intake/Output, MAR, Accordion, Recent Results, Med Rec Status and Cardiac Rhythm NSR.

## 2020-03-23 NOTE — PROGRESS NOTES
[facility] Care, [facility] Sleep Medicine Progress note Name: Ruben Franks MRN: [id number] : 1950 Hospital: [address] [facility] Date: 3/23/2020 IMPRESSION:  
· Acute on chronic hypoxemic/hypercapnic respiratory failure. Remains on HFNC · Advanced COPD w/ acute exacerbation · Hyponatremia , stable for now · New onset afib 
· HTN 
· Tobacco abuse RECOMMENDATIONS:  
· Remain on continuous HF vs NIV; continue NIV nightly · Would benefit from trilogy on discharge · Continue scheduled duonebs, pulmicort, brovana, flutter valve · IV steroids weaned again today · Completed 5 day course of Doxy; no additional abx indicated at this time given clear CXR and normal WBC. Suspect elevated LA related to diuresis/contraction alkalosis · Holding Bumex for now given elevated lactic acid and alkalosis on metabolic panel and ABG · Speech eval - functional swallow; aspiration risk due to HF and respiratory status. · Cardizem PO for rate control · Agree with holding SSRI - trend Na · On protonix for GERD · Eliquis · Greatly appreciate assistance of Palliative Care team.  Ongoing discussions with patient and family. He has reversed his DNR. Patient is critically ill with severe hypoxic, hypercapnic respiratory failure requiring HF oxygen and NIV and is high risk for further decompensation. Subjective:  
 
 
 
Mr. Liset Sharma is a 70yo male w/ history of chronic hypoxemic respiratory failure (on 4L at baseline), COPD and anxiety who presented to the ER on 3/11 w/ complaints of shortness of breath x 2 weeks, wheezing, cough productive of brown sputum, pleuritic chest pain and sore throat. Denies sick contacts or recent travel. Does report frequent coughing while eating/drinking. Is mostly homebound but gets to the [place]  for appointments and was last there on Monday. He is followed by pulmonary at the [place] -- sees Dr. Juventino Closs. Smokes 1ppd (down from 3ppd) since age 15. 
 
3/11 - CTA chest: emphysematous changes. No asdz. Negative for PE *all cultures NGTD *RVP negative Interval history: Afebrile Sats 95% on 40L HF FiO2 60% BP and HR stable currently Na 132 Bicarb 33; better 3/15 ECHO: EF 55-60%; no valve abnormalities ROS: Feels about the same. States that he's still SOB with getting up. Got up to bedside commode and had to turn up to 70%. Intermittent sputum production. Denies fever or chills. PMH - COPD, chronic respiratory failure, home oxygen, hypertension Prior to Admission medications Medication Sig Start Date End Date Taking? Authorizing Provider  
predniSONE (DELTASONE) 5 mg tablet Take 5 mg by mouth daily. Yes Provider, Historical  
albuterol (PROVENTIL HFA, VENTOLIN HFA, PROAIR HFA) 90 mcg/actuation inhaler Take 2 Puffs by inhalation every four (4) hours as needed for Wheezing or Shortness of Breath. Yes Provider, Historical  
aspirin delayed-release 81 mg tablet Take 81 mg by mouth daily. Yes Provider, Historical  
atorvastatin (LIPITOR) 80 mg tablet Take 40 mg by mouth nightly. Yes Provider, Historical  
docusate sodium (COLACE) 100 mg capsule Take 100 mg by mouth three (3) times daily as needed for Constipation. Yes Provider, Historical  
senna (SENOKOT) 8.6 mg tablet Take 2 Tabs by mouth nightly. Yes Provider, Historical  
psyllium (METAMUCIL) powd Take 1 Dose by mouth two (2) times a day. 2 teaspoons in liquid twice daily    Yes Provider, Historical  
diazePAM (VALIUM) 5 mg tablet Take 2.5 mg by mouth daily. Indications: anxious   Yes Provider, Historical  
cholecalciferol (VITAMIN D3) (1000 Units /25 mcg) tablet Take 2,000 Units by mouth daily. Yes Provider, Historical  
denosumab (PROLIA) 60 mg/mL injection 60 mg by SubCUTAneous route See Admin Instructions. One syringe subq every 6 months.   Due 3/13    Yes Provider, Historical  
 ipratropium (ATROVENT) 0.02 % soln 0.5 mg by Nebulization route every four (4) hours as needed (shortness of breath). Yes Provider, Historical  
guaiFENesin (ORGANIDIN) 200 mg tablet Take 200 mg by mouth every six (6) hours as needed for Congestion. Yes Provider, Historical  
metoprolol tartrate (LOPRESSOR) 50 mg tablet Take 50 mg by mouth two (2) times a day. Patient takes in morning (0900) and afternoon (1600)   Yes Provider, Historical  
albuterol (PROVENTIL VENTOLIN) 2.5 mg /3 mL (0.083 %) nebu 2.5 mg by Nebulization route every six (6) hours as needed for Wheezing. Yes Provider, Historical  
budesonide-formoteroL (SYMBICORT) 160-4.5 mcg/actuation HFAA Take 2 Puffs by inhalation two (2) times a day. Yes Provider, Historical  
polyethylene glycol (MIRALAX) 17 gram packet Take 17 g by mouth daily as needed for Constipation. Yes Provider, Historical  
pantoprazole (PROTONIX) 40 mg tablet Take 40 mg by mouth daily. Yes Provider, Historical  
melatonin 5 mg tablet Take 10 mg by mouth nightly. Yes Provider, Historical  
prazosin (MINIPRESS) 2 mg capsule Take 4 mg by mouth nightly. Yes Provider, Historical  
ziprasidone (GEODON) 40 mg capsule Take 40 mg by mouth two (2) times daily (with meals). Yes Provider, Historical  
FLUoxetine (PROzac) 20 mg capsule Take 60 mg by mouth daily. Yes Provider, Historical  
pediatric multivitamins chewable tablet Take 1 Tab by mouth daily. Yes Provider, Historical  
brinzolamide-brimonidine 1-0.2 % drps Administer 1 Drop to both eyes three (3) times daily. Yes Provider, Historical  
diazePAM (VALIUM) 5 mg tablet Take 5 mg by mouth nightly. Indications: anxious   Yes Provider, Historical  
 
Allergies Allergen Reactions  Hydrocodone Nausea Only  Oxycodone Nausea Only  Percocet [Oxycodone-Acetaminophen] Nausea Only  Vicodin [Hydrocodone-Acetaminophen] Nausea Only Social History Tobacco Use  Smoking status: Not on file Substance Use Topics  Alcohol use: Not on file No family history on file. Current Facility-Administered Medications Medication Dose Route Frequency  methylPREDNISolone (PF) (SOLU-MEDROL) injection 60 mg  60 mg IntraVENous Q8H  
 diazePAM (VALIUM) tablet 2.5 mg  2.5 mg Oral Q8H  
 dilTIAZem CD (CARDIZEM CD) capsule 300 mg  300 mg Oral DAILY  polyethylene glycol (MIRALAX) packet 17 g  17 g Oral BID  
 apixaban (ELIQUIS) tablet 5 mg  5 mg Oral Q12H  pantoprazole (PROTONIX) tablet 40 mg  40 mg Oral ACB  sodium chloride (NS) flush 5-40 mL  5-40 mL IntraVENous Q8H  
 nicotine (NICODERM CQ) 21 mg/24 hr patch 1 Patch  1 Patch TransDERmal DAILY  senna (SENOKOT) tablet 17.2 mg  2 Tab Oral QHS  atorvastatin (LIPITOR) tablet 40 mg  40 mg Oral QHS  dorzolamide (TRUSOPT) 2 % ophthalmic solution 1 Drop  1 Drop Both Eyes TID  ziprasidone (GEODON) capsule 40 mg  40 mg Oral BID WITH MEALS  
 brimonidine (ALPHAGAN) 0.2 % ophthalmic solution 1 Drop  1 Drop Both Eyes TID  budesonide (PULMICORT) 500 mcg/2 ml nebulizer suspension  500 mcg Nebulization BID RT  
 arformoteroL (BROVANA) neb solution 15 mcg  15 mcg Nebulization BID RT  
 melatonin tablet 9 mg  9 mg Oral QHS  prazosin (MINIPRESS) capsule 4 mg  4 mg Oral QHS  albuterol-ipratropium (DUO-NEB) 2.5 MG-0.5 MG/3 ML  3 mL Nebulization Q4H RT  
 guaiFENesin ER (MUCINEX) tablet 1,200 mg  1,200 mg Oral BID Objective:  
Vital Signs:   
Visit Vitals /67 (BP 1 Location: Right arm, BP Patient Position: Head of bed elevated (Comment degrees)) Pulse 97 Temp 97.6 °F (36.4 °C) Resp 25 Ht 5' 9\" (1.753 m) Wt 66.4 kg (146 lb 4.8 oz) SpO2 96% BMI 21.60 kg/m² O2 Device: Hi flow nasal cannula O2 Flow Rate (L/min): 45 l/min Temp (24hrs), Av.7 °F (36.5 °C), Min:97.4 °F (36.3 °C), Max:98 °F (36.7 °C) Intake/Output:  
Last shift:      701 - 1900 In: 360 [P.O.:360] Out: - Last 3 shifts: 03/21 1901 - 03/23 0700 In: 3120 [P.O.:3120] Out: 3551 [QFWUC:4561] Intake/Output Summary (Last 24 hours) at 3/23/2020 1573 Last data filed at 3/23/2020 0502 Gross per 24 hour Intake 2520 ml Output 1851 ml Net 669 ml Physical Exam:  
General:  Awake, alert, on HF, + resp distress Head:  NCAT Eyes:  EOMI, anicteric Nose: Nares clear, HF in place, septum midline Throat: Clear, MM Neck: Trachea midline,supple Lungs:   Diminished, bilateral wheeze, increased WOB Chest wall:  Normal shape, nontender Heart:  RRR Abdomen:   Soft, NT, ND, +bowel sounds Extremities: No edema Pulses: 2+ Skin: Dry, intact, tattoos Lymph nodes: No cervical lymphadenopathy Neurologic: No focal findings, oriented x 3, poor insight into severity of disease Data review:  
 
Recent Results (from the past 24 hour(s)) CBC W/O DIFF Collection Time: 03/23/20  7:30 AM  
Result Value Ref Range WBC 11.1 4.1 - 11.1 K/uL  
 RBC 3.98 (L) 4.10 - 5.70 M/uL  
 HGB 13.0 12.1 - 17.0 g/dL HCT 39.3 36.6 - 50.3 % MCV 98.7 80.0 - 99.0 FL  
 MCH 32.7 26.0 - 34.0 PG  
 MCHC 33.1 30.0 - 36.5 g/dL  
 RDW 11.9 11.5 - 14.5 % PLATELET 588 676 - 145 K/uL MPV 9.1 8.9 - 12.9 FL  
 NRBC 0.0 0  WBC ABSOLUTE NRBC 0.00 0.00 - 0.01 K/uL METABOLIC PANEL, BASIC Collection Time: 03/23/20  7:30 AM  
Result Value Ref Range Sodium 132 (L) 136 - 145 mmol/L Potassium 4.6 3.5 - 5.1 mmol/L Chloride 93 (L) 97 - 108 mmol/L  
 CO2 33 (H) 21 - 32 mmol/L Anion gap 6 5 - 15 mmol/L Glucose 197 (H) 65 - 100 mg/dL BUN 22 (H) 6 - 20 MG/DL Creatinine 0.83 0.70 - 1.30 MG/DL  
 BUN/Creatinine ratio 27 (H) 12 - 20 GFR est AA >60 >60 ml/min/1.73m2 GFR est non-AA >60 >60 ml/min/1.73m2 Calcium 8.6 8.5 - 10.1 MG/DL MAGNESIUM Collection Time: 03/23/20  7:30 AM  
Result Value Ref Range Magnesium 2.1 1.6 - 2.4 mg/dL PHOSPHORUS  Collection Time: 03/23/20  7:30 AM  
 Result Value Ref Range Phosphorus 4.0 2.6 - 4.7 MG/DL Imaging: 
I have personally reviewed the patients radiographs and have reviewed the reports: CXR 3/18/2020 - hyperinflation, no acute process.   
 
  
Audrey Hand, NP

## 2020-03-23 NOTE — PROGRESS NOTES
Bedside and Verbal shift change report given to [facility] RN(oncoming nurse) by Rabia Rodriguez  (offgoing nurse). Report included the following information SBAR, Kardex, Intake/Output, Accordion and Recent Results. SHIFT REPORT: 
 
0720: introduced self as primary nurse. Pt on phone with daughter Bedside and Verbal shift change report given to Mandi ROMERO RN (oncoming nurse) by [facility] RN  (offgoing nurse). Report included the following information SBAR, Kardex, Intake/Output, Accordion and Recent Results.

## 2020-03-23 NOTE — PROGRESS NOTES
Palliative Medicine Consult [place]: [phone] ([phone]) Patient Name: Linda Flores YOB: 1950 Date of Initial Consult: 3/13/2020 Reason for Consult: ES Disease Requesting Provider: Anoop SMILEY Primary Care Physician: None SUMMARY:  
Linda Flores is a 81 y.o. with a past history of COPD/GOLD3 on O2 4L continuous, +active tobacco use, HLD, HTN, Anxiety who was admitted on 3/11/2020 from home with a diagnosis of Acute Hypercapnic Respiratory Failure. Current medical issues leading to Palliative Medicine involvement include: GOC discussion in setting of ES COPD. Patient presented to Er w/ cc of worsening SOB x 2 weeks, with associated wheezing, productive cough and pain. Patient also endorsed a cough secondary to cough as well as episode of diarrhea earlier day of admission. In ER patient observed to be thin, chronically ill appearing and in respiratory distress, he had + temp 101.6, labs w/ Na 129, creatinine and albumin WNL. Patient required 4L O2. Respiratory panel returned negative. Patient started on empiric Abx and was admitted. Course of hospitalization: O2 needs increased requiring bipap. 3/13 CXR mild interstitial pulmonary edema and small right pleural effusion, ABG abnormal, CO2 83. Soon after admission, developed  Intermittent Afib with RVR, cardiology following, requiring active adjustments to cardiac meds. PALLIATIVE DIAGNOSES:  
1. Advanced care planning discussion 2. Goals of care discussion 3. DNR discussion 4. Shortness of breath 5. Anxiety PLAN:  
 
1. Met with patient this morning by himself. Reviewed the events from the weekend. Patient still remains on high flow nasal cannula and BiPAP at night. Been very difficult to wean and this appears to be related to progression of his COPD rather than any acute infection at this point since he has been treated with antibiotics.   Noted that patient changes CODE STATUS back to full code over the weekend. We had a long discussion about resuscitation and intubation given his advanced lung disease and the limitation that this provides. Educated extensively on why we would recommend no attempts at resuscitation or intubation given his advanced lung disease. Educated again on why at this point he cannot go home on high flow nasal cannula because that cannot be done in the home setting. By the end of this discussion, patient agreed again on no attempts at resuscitation or intubation but also wanted to include his ex-wife and daughter in a discussion. 2. Later, return to the room to talk with patient, his daughter who is in the room, and his ex-wife was on the phone. We reviewed options moving forward to include attempting to wean the high flow further and this potentially could be done at [facility] versus home with hospicewe would need to arrange home BiPAP, continuous hospice care at home, and medications available for his comfort because I think he likely will pass quickly once BiPAP is removed at home versus inpatient hospice care. Discussed the risk and benefits of all these options moving forward. Answered all questions for family and patient. He would prefer to go home so that he can have a \"1 more cigarette \"but we also explained the difficulties in the home setting. By the end of this discussion, patient agreed for a hospice informational session tomorrow. I talked with Cam Nelson, hospice liaison and we will see the patient together in the morning around 10 AM.  We will include family via phone if they are not in the room to have this discussion. 3. Goals of care discussion-patient's goal is to return home if at all possible. See our discussion above about the limitations of going home. Possibility of hospice either at home or inpatient. We will meet again tomorrow morning. 4. DNR discussion-reviewed at length resuscitation and intubation. Patient has agreed to both meetings I had with him today about no attempts at resuscitation or intubation. We will need a durable DO NOT RESUSCITATE form at the time of discharge. 5. Shortness of breath-inpatient team currently managing. 6. Anxiety-remains on scheduled diazepam. 
7. Reviewed with bedside nurse, case management, Dr. Danial العراقي. 
8.  Initial consult note routed to primary continuity provider and/or primary health care team members 9. Communicated plan of care with: Palliative IDT, [facility] Team, GOALS OF CARE / TREATMENT PREFERENCES:  
 
GOALS OF CARE: continue with full restorative tx and interventions at this time Patient/Health Care Proxy Stated Goals: Prolong life TREATMENT PREFERENCES:  
Code Status: DNR Advance Care Planning: 
[x] The [employer] [facility] Interdisciplinary Team has updated the ACP Navigator with Bud 8 and Patient Capacity Advance Care Planning 3/14/2020 Patient's Healthcare Decision Maker is: Legal Next of Kin Confirm Advance Directive None Patient Would Like to Complete Advance Directive Unable Medical Interventions: Full interventions Other Instructions: Other: As far as possible, the palliative care team has discussed with patient / health care proxy about goals of care / treatment preferences for patient. HISTORY:  
 
History obtained from: medical records/nurse/patient CHIEF COMPLAINT: still wants to go home HPI/SUBJECTIVE: The patient is:  
[x] Verbal and participatory [] Non-participatory due to:  
+sob, + anxiety 3/13- BiPAP continuously 3/17- requiring combination of  High Flow 50L  and BiPAP (mostly at night) 3/18- Slight increased O2 needs, using BiPAP more today, Hi flow 60 l/min 3/19 patient feels like his breathing is maybe a little better. Remains on high flow nasal cannula and intermittently BiPAP. 3/23high flow nasal cannula remains in place.   He has been wearing BiPAP at night Clinical Pain Assessment (nonverbal scale for severity on nonverbal patients):  
Clinical Pain Assessment Severity: 0 Location: denied pain Character: denied pain Duration: denied pain Effect: denied pain Factors: denied pain Frequency: denied pain Activity (Movement): Lying quietly, normal position Duration: for how long has pt been experiencing pain (e.g., 2 days, 1 month, years) Frequency: how often pain is an issue (e.g., several times per day, once every few days, constant) FUNCTIONAL ASSESSMENT:  
 
Palliative Performance Scale (PPS): PPS: 40 PSYCHOSOCIAL/SPIRITUAL SCREENING:  
 
Palliative IDT has assessed this patient for cultural preferences / practices and a referral made as appropriate to needs (Cultural Services, Patient Advocacy, Ethics, etc.) Any spiritual / Restorationist concerns: 
[] Yes /  [x] No 
 
Caregiver Burnout: 
[] Yes /  [] No /  [x] No Caregiver Present Anticipatory grief assessment:  
[x] Normal  / [] Maladaptive ESAS Anxiety: Anxiety: 4 ESAS Depression:    
 
 
 REVIEW OF SYSTEMS:  
 
Positive and pertinent negative findings in ROS are noted above in HPI. The following systems were [x] reviewed / [] unable to be reviewed as noted in HPI Other findings are noted below. Systems: constitutional, ears/nose/mouth/throat, respiratory, gastrointestinal, genitourinary, musculoskeletal, integumentary, neurologic, psychiatric, endocrine. Positive findings noted below. Modified ESAS Completed by: provider Fatigue: 5 Drowsiness: 0 Pain: 0 Anxiety: 4 Anorexia: 5 Dyspnea: 5 Stool Occurrence(s): 1 PHYSICAL EXAM:  
 
From RN flowsheet: 
Wt Readings from Last 3 Encounters:  
03/23/20 146 lb 4.8 oz (66.4 kg) Blood pressure 140/58, pulse 93, temperature 97.8 °F (36.6 °C), resp. rate 20, height 5' 9\" (1.753 m), weight 146 lb 4.8 oz (66.4 kg), SpO2 93 %. Pain Scale 1: Numeric (0 - 10) Pain Intensity 1: 7 Pain Onset 1: chronic Pain Location 1: Back Pain Orientation 1: Lower Pain Description 1: Aching Pain Intervention(s) 1: Emotional support Last bowel movement, if known:  
 
Constitutional: thin, frail, chronically ill appearing, oriented and alert. Eyes: pupils equal, anicteric ENMT: High flow in place Cardiovascular: regular rhythm, distal pulses intact Respiratory: mild labor as seen by accessory muscle use, high flow nasal cannula in place Gastrointestinal: soft non-tender, +bowel sounds Musculoskeletal: no deformity, no tenderness to palpation Skin: warm, dry Neurologic: fatigued,, able to following commands, weak, moving all extremities Psychiatric: Slightly anxious HISTORY:  
 
Principal Problem: COPD with acute exacerbation ([facility] [address] (3/20/2020) Active Problems: 
  Acute on chronic respiratory failure with hypoxia ([facility] [address] (3/11/2020) PAF (paroxysmal atrial fibrillation) ([facility] [address] (3/20/2020) HTN (hypertension) (3/20/2020) Anxiety (3/20/2020) No past medical history on file. No past surgical history on file. No family history on file. History reviewed, no pertinent family history. Social History Tobacco Use  Smoking status: Not on file Substance Use Topics  Alcohol use: Not on file Allergies Allergen Reactions  Hydrocodone Nausea Only  Oxycodone Nausea Only  Percocet [Oxycodone-Acetaminophen] Nausea Only  Vicodin [Hydrocodone-Acetaminophen] Nausea Only Current Facility-Administered Medications Medication Dose Route Frequency  methylPREDNISolone (PF) (SOLU-MEDROL) injection 40 mg  40 mg IntraVENous Q8H  
 diazePAM (VALIUM) tablet 2.5 mg  2.5 mg Oral Q8H  
 traMADoL (ULTRAM) tablet 50 mg  50 mg Oral Q6H PRN  
 dilTIAZem CD (CARDIZEM CD) capsule 300 mg  300 mg Oral DAILY  polyethylene glycol (MIRALAX) packet 17 g  17 g Oral BID  
 apixaban (ELIQUIS) tablet 5 mg  5 mg Oral Q12H  pantoprazole (PROTONIX) tablet 40 mg  40 mg Oral ACB  sodium chloride (NS) flush 5-40 mL  5-40 mL IntraVENous Q8H  
 sodium chloride (NS) flush 5-40 mL  5-40 mL IntraVENous PRN  
 ondansetron (ZOFRAN) injection 4 mg  4 mg IntraVENous Q4H PRN  
 nicotine (NICODERM CQ) 21 mg/24 hr patch 1 Patch  1 Patch TransDERmal DAILY  senna (SENOKOT) tablet 17.2 mg  2 Tab Oral QHS  docusate sodium (COLACE) capsule 100 mg  100 mg Oral TID PRN  
 atorvastatin (LIPITOR) tablet 40 mg  40 mg Oral QHS  acetaminophen (TYLENOL) tablet 650 mg  650 mg Oral Q6H PRN  
 dorzolamide (TRUSOPT) 2 % ophthalmic solution 1 Drop  1 Drop Both Eyes TID  ziprasidone (GEODON) capsule 40 mg  40 mg Oral BID WITH MEALS  
 brimonidine (ALPHAGAN) 0.2 % ophthalmic solution 1 Drop  1 Drop Both Eyes TID  budesonide (PULMICORT) 500 mcg/2 ml nebulizer suspension  500 mcg Nebulization BID RT  
 arformoteroL (BROVANA) neb solution 15 mcg  15 mcg Nebulization BID RT  
 melatonin tablet 9 mg  9 mg Oral QHS  prazosin (MINIPRESS) capsule 4 mg  4 mg Oral QHS  albuterol-ipratropium (DUO-NEB) 2.5 MG-0.5 MG/3 ML  3 mL Nebulization Q4H RT  
 guaiFENesin ER (MUCINEX) tablet 1,200 mg  1,200 mg Oral BID  sodium chloride (NS) flush 5-10 mL  5-10 mL IntraVENous PRN  
 
 
 
 LAB AND IMAGING FINDINGS:  
 
Lab Results Component Value Date/Time WBC 11.1 03/23/2020 07:30 AM  
 HGB 13.0 03/23/2020 07:30 AM  
 PLATELET 789 80/38/9670 07:30 AM  
 
Lab Results Component Value Date/Time Sodium 132 (L) 03/23/2020 07:30 AM  
 Potassium 4.6 03/23/2020 07:30 AM  
 Chloride 93 (L) 03/23/2020 07:30 AM  
 CO2 33 (H) 03/23/2020 07:30 AM  
 BUN 22 (H) 03/23/2020 07:30 AM  
 Creatinine 0.83 03/23/2020 07:30 AM  
 Calcium 8.6 03/23/2020 07:30 AM  
 Magnesium 2.1 03/23/2020 07:30 AM  
 Phosphorus 4.0 03/23/2020 07:30 AM  
  
Lab Results Component Value Date/Time AST (SGOT) 17 03/20/2020 01:45 AM  
 Alk.  phosphatase 56 03/20/2020 01:45 AM  
 Protein, total 5.6 (L) 03/20/2020 01:45 AM  
 Albumin 3.1 (L) 03/20/2020 01:45 AM  
 Globulin 2.5 03/20/2020 01:45 AM  
 
No results found for: INR, PTMR, PTP, PT1, PT2, APTT, INREXT, INREXT No results found for: IRON, FE, TIBC, IBCT, PSAT, FERR Lab Results Component Value Date/Time pH 7.46 (H) 03/18/2020 10:10 AM  
 PCO2 56 (H) 03/18/2020 10:10 AM  
 PO2 81 03/18/2020 10:10 AM  
 
No components found for: [place] No results found for: CPK, CKMB Total time: 65min Counseling / coordination time, spent as noted above: 60 min 
> 50% counseling / coordination?: yes Prolonged service was provided for  [x]30 min   []75 min in face to face time in the presence of the patient, spent as noted above. Time Start: 6 AM-11:35 AM-first meeting Time End: 2:00-2:30 PM-second meeting Note: this can only be billed with 43421 (initial) or 83092 (follow up). If multiple start / stop times, list each separately.

## 2020-03-24 NOTE — PROGRESS NOTES
[facility] Pulmonary, Critical Care, and Sleep Medicine Progress note Name: La Monroy MRN: [id number] : 1950 Hospital: [address] [facility] Date: 3/24/2020 IMPRESSION:  
· Acute on chronic hypoxemic/hypercapnic respiratory failure. Remains on HFNC · Advanced COPD w/ acute exacerbation · Hyponatremia , stable for now · New onset afib 
· HTN 
· Tobacco abuse RECOMMENDATIONS:  
· Remain on continuous HF vs NIV; continue NIV nightly · Would benefit from trilogy on discharge · Continue scheduled duonebs, pulmicort, brovana, flutter valve · Keep steroids at current dose · Completed 5 day course of Doxy; no additional abx indicated at this time · Speech eval - functional swallow; aspiration risk due to HF and respiratory status. · Cardizem PO for rate control · Agree with holding SSRI - trend Na · On protonix for GERD · Eliquis · Greatly appreciate assistance of Palliative Care team.  Ongoing discussions with patient and family. Now DNR again and has agreed to speak with hospice today. Patient is critically ill with severe hypoxic, hypercapnic respiratory failure requiring HF oxygen and NIV and is high risk for further decompensation. Subjective:  
 
 
 
Mr. Maryann Nathan is a 72yo male w/ history of chronic hypoxemic respiratory failure (on 4L at baseline), COPD and anxiety who presented to the ER on 3/11 w/ complaints of shortness of breath x 2 weeks, wheezing, cough productive of brown sputum, pleuritic chest pain and sore throat. Denies sick contacts or recent travel. Does report frequent coughing while eating/drinking. Is mostly homebound but gets to the [place]  for appointments and was last there on Monday. He is followed by pulmonary at the [place] -- sees Dr. Kaye Lucero. Smokes 1ppd (down from 3ppd) since age 15. 
 
3/11 - CTA chest: emphysematous changes. No asdz. Negative for PE *all cultures NGTD *RVP negative Interval history: Afebrile Sats 95%; alternating HFNC/BiPap BP and HR stable currently WBC 11.1 Hgb 13 Na 132 
 
3/15 ECHO: EF 55-60%; no valve abnormalities ROS: Feels better with use of BiPap. SOB with minimal activity. Has agreed to speak with hospice today. PMH - COPD, chronic respiratory failure, home oxygen, hypertension Prior to Admission medications Medication Sig Start Date End Date Taking? Authorizing Provider  
predniSONE (DELTASONE) 5 mg tablet Take 5 mg by mouth daily. Yes Provider, Historical  
albuterol (PROVENTIL HFA, VENTOLIN HFA, PROAIR HFA) 90 mcg/actuation inhaler Take 2 Puffs by inhalation every four (4) hours as needed for Wheezing or Shortness of Breath. Yes Provider, Historical  
aspirin delayed-release 81 mg tablet Take 81 mg by mouth daily. Yes Provider, Historical  
atorvastatin (LIPITOR) 80 mg tablet Take 40 mg by mouth nightly. Yes Provider, Historical  
docusate sodium (COLACE) 100 mg capsule Take 100 mg by mouth three (3) times daily as needed for Constipation. Yes Provider, Historical  
senna (SENOKOT) 8.6 mg tablet Take 2 Tabs by mouth nightly. Yes Provider, Historical  
psyllium (METAMUCIL) powd Take 1 Dose by mouth two (2) times a day. 2 teaspoons in liquid twice daily    Yes Provider, Historical  
diazePAM (VALIUM) 5 mg tablet Take 2.5 mg by mouth daily. Indications: anxious   Yes Provider, Historical  
cholecalciferol (VITAMIN D3) (1000 Units /25 mcg) tablet Take 2,000 Units by mouth daily. Yes Provider, Historical  
denosumab (PROLIA) 60 mg/mL injection 60 mg by SubCUTAneous route See Admin Instructions. One syringe subq every 6 months. Due 3/13    Yes Provider, Historical  
ipratropium (ATROVENT) 0.02 % soln 0.5 mg by Nebulization route every four (4) hours as needed (shortness of breath).    Yes Provider, Historical  
guaiFENesin (ORGANIDIN) 200 mg tablet Take 200 mg by mouth every six (6) hours as needed for Congestion. Yes Provider, Historical  
metoprolol tartrate (LOPRESSOR) 50 mg tablet Take 50 mg by mouth two (2) times a day. Patient takes in morning (0900) and afternoon (1600)   Yes Provider, Historical  
albuterol (PROVENTIL VENTOLIN) 2.5 mg /3 mL (0.083 %) nebu 2.5 mg by Nebulization route every six (6) hours as needed for Wheezing. Yes Provider, Historical  
budesonide-formoteroL (SYMBICORT) 160-4.5 mcg/actuation HFAA Take 2 Puffs by inhalation two (2) times a day. Yes Provider, Historical  
polyethylene glycol (MIRALAX) 17 gram packet Take 17 g by mouth daily as needed for Constipation. Yes Provider, Historical  
pantoprazole (PROTONIX) 40 mg tablet Take 40 mg by mouth daily. Yes Provider, Historical  
melatonin 5 mg tablet Take 10 mg by mouth nightly. Yes Provider, Historical  
prazosin (MINIPRESS) 2 mg capsule Take 4 mg by mouth nightly. Yes Provider, Historical  
ziprasidone (GEODON) 40 mg capsule Take 40 mg by mouth two (2) times daily (with meals). Yes Provider, Historical  
FLUoxetine (PROzac) 20 mg capsule Take 60 mg by mouth daily. Yes Provider, Historical  
pediatric multivitamins chewable tablet Take 1 Tab by mouth daily. Yes Provider, Historical  
brinzolamide-brimonidine 1-0.2 % drps Administer 1 Drop to both eyes three (3) times daily. Yes Provider, Historical  
diazePAM (VALIUM) 5 mg tablet Take 5 mg by mouth nightly. Indications: anxious   Yes Provider, Historical  
 
Allergies Allergen Reactions  Hydrocodone Nausea Only  Oxycodone Nausea Only  Percocet [Oxycodone-Acetaminophen] Nausea Only  Vicodin [Hydrocodone-Acetaminophen] Nausea Only Social History Tobacco Use  Smoking status: Not on file Substance Use Topics  Alcohol use: Not on file No family history on file. Current Facility-Administered Medications Medication Dose Route Frequency  methylPREDNISolone (PF) (SOLU-MEDROL) injection 40 mg  40 mg IntraVENous Q8H  
 diazePAM (VALIUM) tablet 2.5 mg  2.5 mg Oral Q8H  
 dilTIAZem CD (CARDIZEM CD) capsule 300 mg  300 mg Oral DAILY  polyethylene glycol (MIRALAX) packet 17 g  17 g Oral BID  
 apixaban (ELIQUIS) tablet 5 mg  5 mg Oral Q12H  pantoprazole (PROTONIX) tablet 40 mg  40 mg Oral ACB  sodium chloride (NS) flush 5-40 mL  5-40 mL IntraVENous Q8H  
 nicotine (NICODERM CQ) 21 mg/24 hr patch 1 Patch  1 Patch TransDERmal DAILY  senna (SENOKOT) tablet 17.2 mg  2 Tab Oral QHS  atorvastatin (LIPITOR) tablet 40 mg  40 mg Oral QHS  dorzolamide (TRUSOPT) 2 % ophthalmic solution 1 Drop  1 Drop Both Eyes TID  ziprasidone (GEODON) capsule 40 mg  40 mg Oral BID WITH MEALS  
 brimonidine (ALPHAGAN) 0.2 % ophthalmic solution 1 Drop  1 Drop Both Eyes TID  budesonide (PULMICORT) 500 mcg/2 ml nebulizer suspension  500 mcg Nebulization BID RT  
 arformoteroL (BROVANA) neb solution 15 mcg  15 mcg Nebulization BID RT  
 melatonin tablet 9 mg  9 mg Oral QHS  prazosin (MINIPRESS) capsule 4 mg  4 mg Oral QHS  albuterol-ipratropium (DUO-NEB) 2.5 MG-0.5 MG/3 ML  3 mL Nebulization Q4H RT  
 guaiFENesin ER (MUCINEX) tablet 1,200 mg  1,200 mg Oral BID Objective:  
Vital Signs:   
Visit Vitals /61 (BP 1 Location: Right arm) Pulse 85 Temp 97.9 °F (36.6 °C) Resp 18 Ht 5' 9\" (1.753 m) Wt 67.4 kg (148 lb 11.2 oz) SpO2 97% BMI 21.96 kg/m² O2 Device: BIPAP  
O2 Flow Rate (L/min): 45 l/min Temp (24hrs), Av.8 °F (36.6 °C), Min:97.5 °F (36.4 °C), Max:97.9 °F (36.6 °C) Intake/Output:  
Last shift:      No intake/output data recorded. Last 3 shifts: 1901 -  0700 In: 2280 [P.O.:2280] Out: 2721 [[id number]] Intake/Output Summary (Last 24 hours) at 3/24/2020 6565 Last data filed at 3/24/2020 8011 Gross per 24 hour Intake 960 ml Output 2550 ml Net -1590 ml Physical Exam: General:  Awake, alert, on HF, + resp distress Head:  NCAT Eyes:  EOMI, anicteric Nose: Nares clear, HF in place, septum midline Throat: Clear, MM Neck: Trachea midline,supple Lungs:   Diminished, bilateral wheeze L>R posteriorly, increased WOB with speaking Chest wall:  Normal shape, nontender Heart:  RRR Abdomen:   Soft, NT, ND, +bowel sounds Extremities: No edema Pulses: 2+ Skin: Dry, intact, tattoos Lymph nodes: No cervical lymphadenopathy Neurologic: No focal findings, oriented x 3, poor insight into severity of disease Data review: No results found for this or any previous visit (from the past 24 hour(s)). Imaging: 
I have personally reviewed the patients radiographs and have reviewed the reports: CXR 3/18/2020 - hyperinflation, no acute process.   
 
  
Rian Acosta NP

## 2020-03-24 NOTE — PROGRESS NOTES
Bedside and Verbal shift change report given to Max Vu (oncoming nurse) by Lora Lincoln RN (offgoing nurse). Report included the following information SBAR, Kardex, Intake/Output, Accordion and Recent Results. SHIFT REPORT: 
 
0715: pt sitting up in bed. RT at bedside to administer breathing treatment Bedside and Verbal shift change report given to Tisha CHRISTOPHER RN (oncoming nurse) by Liam La RN  (offgoing nurse). Report included the following information SBAR, Kardex, Intake/Output, Accordion and Recent Results.

## 2020-03-24 NOTE — PROGRESS NOTES
Palliative Medicine Consult [place]: [phone] Patient Name: Irene Nagy YOB: 1950 Date of Initial Consult: 3/13/2020 Reason for Consult: ES Disease Requesting Provider: Diane SMILEY Primary Care Physician: None SUMMARY:  
Irene Nagy is a 81 y.o. with a past history of COPD/GOLD3 on O2 4L continuous, +active tobacco use, HLD, HTN, Anxiety who was admitted on 3/11/2020 from home with a diagnosis of Acute Hypercapnic Respiratory Failure. Current medical issues leading to Palliative Medicine involvement include: GOC discussion in setting of ES COPD. Patient presented to Er w/ cc of worsening SOB x 2 weeks, with associated wheezing, productive cough and pain. Patient also endorsed a cough secondary to cough as well as episode of diarrhea earlier day of admission. In ER patient observed to be thin, chronically ill appearing and in respiratory distress, he had + temp 101.6, labs w/ Na 129, creatinine and albumin WNL. Patient required 4L O2. Respiratory panel returned negative. Patient started on empiric Abx and was admitted. Course of hospitalization: O2 needs increased requiring bipap. 3/13 CXR mild interstitial pulmonary edema and small right pleural effusion, ABG abnormal, CO2 83. Soon after admission, developed  Intermittent Afib with RVR, cardiology following, requiring active adjustments to cardiac meds. PALLIATIVE DIAGNOSES:  
1. Advanced care planning discussion 2. Goals of care discussion 3. DNR discussion 4. Shortness of breath 5. Anxiety PLAN:  
 
1. Mercedes Arroyo, hospice liaison/nurse and I met with patient. We also conferenced his daughter and ex-wife via phone. Reviewed the current scenarios with him and then discussed potential options going forward. We reiterated again that we do not think he will improve enough to be able to return home on normal oxygen at this point.   Again, reminded them multiple times that high flow nasal cannula cannot be done in the home setting but BiPAP can. We also reminded them that once either 1 of these is removed, we anticipate that he may decline fairly rapidly so for this reason, we reviewed options with hospice. Patient and family seem to be receptive to accepting hospice at this point but trying to figure out where that is done is the next step. We discussed the possibility of home hospice but he would need BiPAP arranged, continuous hospice care arranged, and medications available immediately for his comfort. Next option, we discussed inpatient hospice either at [place] or ideally at the [facility]. Once again, if admitted here under hospice, we would discuss weaning the high flow nasal cannula but also adjusting medications for his comfort. Again reiterated, high flow nasal cannula cannot be done at the [facility] but we could arrange for BiPAP at least in the short-term. By the end of the discussion, patient not ready to make a decision. Family seemed to be leaning more towards the [facility] by the end of the discussion but patient was noncommittal.  We will both continue to follow-up patient but certainly not here to force him to do hospice. Again, expressed our concern that there may be a small window of opportunity to get him home and that could pass if he continues to decline in the hospital.  At that time, he essentially will be forced to do inpatient hospice. Mabeline Sink 2. Goals of care discussion-patient's goal is to return home if at all possible. Continue to review options with the support of hospice. In the meantime, continue all current treatments. 3. DNR discussion-reviewed at length resuscitation and intubation on 3/23. Patient has agreed to both meetings I had with him today about no attempts at resuscitation or intubation. We will need a durable DO NOT RESUSCITATE form at the time of discharge. 4. Shortness of breath-inpatient team currently managing. 5. Anxiety-remains on scheduled diazepam. 
6. Reviewed with bedside nurse, case management, Dr. Kadie Alvarez. 
7.  Initial consult note routed to primary continuity provider and/or primary health care team members 8. Communicated plan of care with: Palliative [facility] Team, GOALS OF CARE / TREATMENT PREFERENCES:  
 
GOALS OF CARE: continue with full restorative tx and interventions at this time Patient/Health Care Proxy Stated Goals: Prolong life TREATMENT PREFERENCES:  
Code Status: DNR Advance Care Planning: 
[x] [facility] Interdisciplinary Team has updated the ACP Navigator with Milagro and Patient Capacity Advance Care Planning 3/14/2020 Patient's Healthcare Decision Maker is: Legal Next of Kin Confirm Advance Directive None Patient Would Like to Complete Advance Directive Unable Medical Interventions: Full interventions Other Instructions: Other: As far as possible, the palliative care team has discussed with patient / health care proxy about goals of care / treatment preferences for patient. HISTORY:  
 
History obtained from: medical records/nurse/patient CHIEF COMPLAINT: still wants to go home HPI/SUBJECTIVE: The patient is:  
[x] Verbal and participatory [] Non-participatory due to:  
+sob, + anxiety 3/13- BiPAP continuously 3/17- requiring combination of  High Flow 50L  and BiPAP (mostly at night) 3/18- Slight increased O2 needs, using BiPAP more today, Hi flow 60 l/min 3/19 patient feels like his breathing is maybe a little better. Remains on high flow nasal cannula and intermittently BiPAP. 3/23high flow nasal cannula remains in place. He has been wearing BiPAP at night 3/24 patient currently on BiPAP. He did try to get up to go to the bathroom last night and had a fall. Denies any significant pain at this time Clinical Pain Assessment (nonverbal scale for severity on nonverbal patients):  
Clinical Pain Assessment Severity: 0 Location: denied pain Character: denied pain Duration: denied pain Effect: denied pain Factors: denied pain Frequency: denied pain Activity (Movement): Lying quietly, normal position Duration: for how long has pt been experiencing pain (e.g., 2 days, 1 month, years) Frequency: how often pain is an issue (e.g., several times per day, once every few days, constant) FUNCTIONAL ASSESSMENT:  
 
Palliative Performance Scale (PPS): PPS: 40 PSYCHOSOCIAL/SPIRITUAL SCREENING:  
 
Palliative IDT has assessed this patient for cultural preferences / practices and a referral made as appropriate to needs (Cultural Services, Patient Advocacy, Ethics, etc.) Any spiritual / Sabianist concerns: 
[] Yes /  [x] No 
 
Caregiver Burnout: 
[] Yes /  [] No /  [x] No Caregiver Present Anticipatory grief assessment:  
[x] Normal  / [] Maladaptive ESAS Anxiety: Anxiety: 4 ESAS Depression:    
 
 
 REVIEW OF SYSTEMS:  
 
Positive and pertinent negative findings in ROS are noted above in HPI. The following systems were [x] reviewed / [] unable to be reviewed as noted in HPI Other findings are noted below. Systems: constitutional, ears/nose/mouth/throat, respiratory, gastrointestinal, genitourinary, musculoskeletal, integumentary, neurologic, psychiatric, endocrine. Positive findings noted below. Modified ESAS Completed by: provider Fatigue: 5 Drowsiness: 0 Pain: 0 Anxiety: 4 Anorexia: 5 Dyspnea: 5 Stool Occurrence(s): 1 PHYSICAL EXAM:  
 
From RN flowsheet: 
Wt Readings from Last 3 Encounters:  
03/24/20 148 lb 11.2 oz (67.4 kg) Blood pressure 146/61, pulse 88, temperature 97.9 °F (36.6 °C), resp. rate 18, height 5' 9\" (1.753 m), weight 148 lb 11.2 oz (67.4 kg), SpO2 92 %. Pain Scale 1: Numeric (0 - 10) Pain Intensity 1: 7 Pain Onset 1: acurte Pain Location 1: Head 
Pain Orientation 1: Anterior Pain Description 1: Aching Pain Intervention(s) 1: Medication (see MAR) Last bowel movement, if known:  
 
Constitutional: thin, frail, chronically ill appearing, oriented and alert. Eyes: pupils equal, anicteric ENMT: BiPAP in place Cardiovascular: regular rhythm, distal pulses intact Respiratory: mildly labored as seen by accessory muscle use, BiPAP in place Gastrointestinal: soft non-tender, +bowel sounds Musculoskeletal: no deformity, no tenderness to palpation Skin: warm, dry Neurologic: fatigued,, able to following commands, weak, moving all extremities Psychiatric: Slightly anxious HISTORY:  
 
Principal Problem: COPD with acute exacerbation ([address] (3/20/2020) Active Problems: 
  Acute on chronic respiratory failure with hypoxia ([address] (3/11/2020) PAF (paroxysmal atrial fibrillation) ([address] (3/20/2020) HTN (hypertension) (3/20/2020) Anxiety (3/20/2020) No past medical history on file. No past surgical history on file. No family history on file. History reviewed, no pertinent family history. Social History Tobacco Use  Smoking status: Not on file Substance Use Topics  Alcohol use: Not on file Allergies Allergen Reactions  Hydrocodone Nausea Only  Oxycodone Nausea Only  Percocet [Oxycodone-Acetaminophen] Nausea Only  Vicodin [Hydrocodone-Acetaminophen] Nausea Only Current Facility-Administered Medications Medication Dose Route Frequency  methylPREDNISolone (PF) (SOLU-MEDROL) injection 40 mg  40 mg IntraVENous Q8H  
 diazePAM (VALIUM) tablet 2.5 mg  2.5 mg Oral Q8H  
 traMADoL (ULTRAM) tablet 50 mg  50 mg Oral Q6H PRN  
 dilTIAZem CD (CARDIZEM CD) capsule 300 mg  300 mg Oral DAILY  polyethylene glycol (MIRALAX) packet 17 g  17 g Oral BID  
 apixaban (ELIQUIS) tablet 5 mg  5 mg Oral Q12H  pantoprazole (PROTONIX) tablet 40 mg  40 mg Oral ACB  sodium chloride (NS) flush 5-40 mL  5-40 mL IntraVENous Q8H  
 sodium chloride (NS) flush 5-40 mL  5-40 mL IntraVENous PRN  
 ondansetron (ZOFRAN) injection 4 mg  4 mg IntraVENous Q4H PRN  
 nicotine (NICODERM CQ) 21 mg/24 hr patch 1 Patch  1 Patch TransDERmal DAILY  senna (SENOKOT) tablet 17.2 mg  2 Tab Oral QHS  docusate sodium (COLACE) capsule 100 mg  100 mg Oral TID PRN  
 atorvastatin (LIPITOR) tablet 40 mg  40 mg Oral QHS  acetaminophen (TYLENOL) tablet 650 mg  650 mg Oral Q6H PRN  
 dorzolamide (TRUSOPT) 2 % ophthalmic solution 1 Drop  1 Drop Both Eyes TID  ziprasidone (GEODON) capsule 40 mg  40 mg Oral BID WITH MEALS  
 brimonidine (ALPHAGAN) 0.2 % ophthalmic solution 1 Drop  1 Drop Both Eyes TID  budesonide (PULMICORT) 500 mcg/2 ml nebulizer suspension  500 mcg Nebulization BID RT  
 arformoteroL (BROVANA) neb solution 15 mcg  15 mcg Nebulization BID RT  
 melatonin tablet 9 mg  9 mg Oral QHS  prazosin (MINIPRESS) capsule 4 mg  4 mg Oral QHS  albuterol-ipratropium (DUO-NEB) 2.5 MG-0.5 MG/3 ML  3 mL Nebulization Q4H RT  
 guaiFENesin ER (MUCINEX) tablet 1,200 mg  1,200 mg Oral BID  sodium chloride (NS) flush 5-10 mL  5-10 mL IntraVENous PRN  
 
 
 
 LAB AND IMAGING FINDINGS:  
 
Lab Results Component Value Date/Time WBC 11.1 03/23/2020 07:30 AM  
 HGB 13.0 03/23/2020 07:30 AM  
 PLATELET 618 08/27/3767 07:30 AM  
 
Lab Results Component Value Date/Time Sodium 132 (L) 03/23/2020 07:30 AM  
 Potassium 4.6 03/23/2020 07:30 AM  
 Chloride 93 (L) 03/23/2020 07:30 AM  
 CO2 33 (H) 03/23/2020 07:30 AM  
 BUN 22 (H) 03/23/2020 07:30 AM  
 Creatinine 0.83 03/23/2020 07:30 AM  
 Calcium 8.6 03/23/2020 07:30 AM  
 Magnesium 2.1 03/23/2020 07:30 AM  
 Phosphorus 4.0 03/23/2020 07:30 AM  
  
Lab Results Component Value Date/Time AST (SGOT) 17 03/20/2020 01:45 AM  
 Alk.  phosphatase 56 03/20/2020 01:45 AM  
 Protein, total 5.6 (L) 03/20/2020 01:45 AM  
 Albumin 3.1 (L) 03/20/2020 01:45 AM  
 Globulin 2.5 03/20/2020 01:45 AM  
 
No results found for: INR, PTMR, PTP, PT1, PT2, APTT, INREXT, INREXT No results found for: IRON, FE, TIBC, IBCT, PSAT, FERR Lab Results Component Value Date/Time pH 7.46 (H) 03/18/2020 10:10 AM  
 PCO2 56 (H) 03/18/2020 10:10 AM  
 PO2 81 03/18/2020 10:10 AM  
 
No components found for: [place] No results found for: CPK, CKMB Total time: 35 
Counseling / coordination time, spent as noted above:30 min 
> 50% counseling / coordination?: yes Prolonged service was provided for  []30 min   []75 min in face to face time in the presence of the patient, spent as noted above. Time Start:  
Time End:  
Note: this can only be billed with 13030 (initial) or 61541 (follow up). If multiple start / stop times, list each separately.

## 2020-03-24 NOTE — PROGRESS NOTES
visited with pt, Carl Lopez. Facilitated conversation around end of life. Pt, indicated that he was going home on home and will have hospice care. He stated that he initially struggled with news that he was dying, but a conversation with her daughter,whom he says is very Judaism, makes him now feels relaxed. Pt shared with  reflections he had with daughter. They reflected on Biblical promises of life without suffering, which was comforting for pt. He now looks forward to a new and healthy body, reconnecting to  family members among others.  offered actve and empathetic listening and affirmed pt's jade and feelings. Patient stated that taking with  again about this gives him great comfort. He was assured of prayers. Spiritual care is still available upon request or as needed. Visited by: Selwyn Caldera. To sakina berg: [phone]

## 2020-03-24 NOTE — PROGRESS NOTES
visited with pt, Carl Lopez, at the [facility] unit. Facilitated conversation around end of life. Pt indicated that he was going home on hospice and that he was aware he was dying, which was very difficult to handle initially. He stated that a  Conversation he had with one of his daughters, whom he referred to as very Confucianism, makes him now feel relaxed. Pt shared part of their reflection with chaplian, which includes reflections from scriptures about a new body for believers and a life free from suffering. Also, reflections of meeting with  loved ones was very comforting to him.  offered active and empathetic listening, and affirmed pt's jade and feedings. Pt stated that talking with  about this again, gives him great comfort. He was assured of prayer and advised to contact spiritual care as needed. Visited by: Selwyn Caldera. Ángel berg: [phone])

## 2020-03-24 NOTE — PROGRESS NOTES
[facility] 
[address] [facility], [address] [place] [address] 
[phone] Medical Progress Note NAME: Lore Valenzuela :  1950 MRM:  [id number] Date/Time of service: 3/24/2020  9:10 AM 
 
  
Subjective: Chief Complaint:  Patient was personally seen and examined by me during this time period. Chart reviewed. Resp status the same. On hi-flow and bipap. Was interested in hospice Objective:  
 
 
Vitals:  
 
 
Last 24hrs VS reviewed since prior progress note. Most recent are: 
 
Visit Vitals /61 (BP 1 Location: Right arm) Pulse 85 Temp 97.9 °F (36.6 °C) Resp 18 Ht 5' 9\" (1.753 m) Wt 67.4 kg (148 lb 11.2 oz) SpO2 97% BMI 21.96 kg/m² SpO2 Readings from Last 6 Encounters:  
20 97% O2 Flow Rate (L/min): 45 l/min Intake/Output Summary (Last 24 hours) at 3/24/2020 3677 Last data filed at 3/24/2020 3337 Gross per 24 hour Intake 960 ml Output 2550 ml Net -1590 ml Exam:  
 
Physical Exam: 
 
Gen:  Disheveled, ill-appearing, mild distress HEENT:  Pink conjunctivae, PERRL, hearing intact to voice, moist mucous membranes Neck:  Supple, without masses, thyroid non-tender Resp:  some accessory muscle use, prolonged expiration, bilateral wheezing Card:  No murmurs, normal S1, S2 without thrills, bruits or peripheral edema Abd:  Soft, non-tender, non-distended, normoactive bowel sounds are present Musc:  No cyanosis or clubbing Skin:  No rashes Neuro:  Cranial nerves 3-12 are grossly intact, follows commands appropriately Psych:  fair insight, oriented to person, place and time, alert Medications Reviewed: (see below) Lab Data Reviewed: (see below) 
 
______________________________________________________________________ Medications:  
 
Current Facility-Administered Medications Medication Dose Route Frequency  methylPREDNISolone (PF) (SOLU-MEDROL) injection 40 mg  40 mg IntraVENous Q8H  
  diazePAM (VALIUM) tablet 2.5 mg  2.5 mg Oral Q8H  
 traMADoL (ULTRAM) tablet 50 mg  50 mg Oral Q6H PRN  
 dilTIAZem CD (CARDIZEM CD) capsule 300 mg  300 mg Oral DAILY  polyethylene glycol (MIRALAX) packet 17 g  17 g Oral BID  
 apixaban (ELIQUIS) tablet 5 mg  5 mg Oral Q12H  pantoprazole (PROTONIX) tablet 40 mg  40 mg Oral ACB  sodium chloride (NS) flush 5-40 mL  5-40 mL IntraVENous Q8H  
 sodium chloride (NS) flush 5-40 mL  5-40 mL IntraVENous PRN  
 ondansetron (ZOFRAN) injection 4 mg  4 mg IntraVENous Q4H PRN  
 nicotine (NICODERM CQ) 21 mg/24 hr patch 1 Patch  1 Patch TransDERmal DAILY  senna (SENOKOT) tablet 17.2 mg  2 Tab Oral QHS  docusate sodium (COLACE) capsule 100 mg  100 mg Oral TID PRN  
 atorvastatin (LIPITOR) tablet 40 mg  40 mg Oral QHS  acetaminophen (TYLENOL) tablet 650 mg  650 mg Oral Q6H PRN  
 dorzolamide (TRUSOPT) 2 % ophthalmic solution 1 Drop  1 Drop Both Eyes TID  ziprasidone (GEODON) capsule 40 mg  40 mg Oral BID WITH MEALS  
 brimonidine (ALPHAGAN) 0.2 % ophthalmic solution 1 Drop  1 Drop Both Eyes TID  budesonide (PULMICORT) 500 mcg/2 ml nebulizer suspension  500 mcg Nebulization BID RT  
 arformoteroL (BROVANA) neb solution 15 mcg  15 mcg Nebulization BID RT  
 melatonin tablet 9 mg  9 mg Oral QHS  prazosin (MINIPRESS) capsule 4 mg  4 mg Oral QHS  albuterol-ipratropium (DUO-NEB) 2.5 MG-0.5 MG/3 ML  3 mL Nebulization Q4H RT  
 guaiFENesin ER (MUCINEX) tablet 1,200 mg  1,200 mg Oral BID  sodium chloride (NS) flush 5-10 mL  5-10 mL IntraVENous PRN Lab Review:  
 
Recent Labs  
  03/23/20 
0730 WBC 11.1 HGB 13.0 HCT 39.3  Recent Labs  
  03/23/20 
0730 * K 4.6 CL 93* CO2 33* * BUN 22* CREA 0.83 CA 8.6 MG 2.1 PHOS 4.0 No results found for: Dasha Arias Assessment / Plan:  
 
Principal Problem: 
 
82 yo hx of HTN, COPD on 4L, anxiety, presented w/ resp failure, hypoxia, COPD.  Complicated by new afib 1) Acute on chronic resp failure/hypoxia: About the same. Still requiring hi-flow O2 and bipap prn. At baseline, on 4L O2. Likely due to end stage COPD. Palliative and Pulm following. Patient is considering hospice. Will cont to wean O2 if tolerated 2) COPD with acute exacerbation: minimal improvement. Likely has end stage dz. Cont IV steroids, nebs, incentive spirometry. Pulm following 3) New-onset afib: due to COPD. Cont Dilt, eliquis. Cards was following 4) HTN: cont dilt 5) Anxiety: cont valium, geodon Code: Full Total time spent with patient: 20 min **I personally saw and examined the patient during this time period** Care Plan discussed with: Patient, nursing, palliative Discussed:  Care Plan Prophylaxis:  eliquis Disposition:  LTC vs hospice 
        
___________________________________________________ Attending Physician: Carolina Velasquez MD

## 2020-03-24 NOTE — HOSPICE
[facility] [place] [place] [facility] to Those in Need 
[phone] Patient Name: Tania Sim YOB: 1950 Age: 81 y.o. [facility] [place] [place] RN Note:  Hospice consult received, reviewing chart. Will follow up with Unit Nurse and Care Manager to discuss plan of care, patient status and discharge disposition within the hour. Met w/ patient w/ Dr. Binta Valadez and ex-wife, Doni Ennis  Present via conference call. Reviewed hospice services, levels of care and philosophy. Patient is ES COPD, currently changing between using Hi-anali O2 and BiPap. Wearing Bipap throughout the meeting. Sp02 in uppers 90s. Long discussion regarding abilty to get patient home. Appears that patients ideal wishes would be to get home and \"have a cigarette\"  Explained that Hi-anali cannot be done in the home setting. Would be able to have max of 10L O2 and Bipap. Mr. Eliane Hendrix would be at very high risk for bradford decline at home and most likely need continuous care for 1st 24-48 hours as it is predicted once bipap and high flow are removed, patient will become very SOB. If patient stabilizes patient would then transition to home routine hospice care w/o continuous care. Family will need ongoing education that continuous care hospice is very time limited and a hospice nurse would not be present for 24hr/day once CC period ended Discussed possibility of patient transferring to [facility] w/ Bipap wean taking place there to allow for better visitation of family and allow him to get outside as well. Patient and family will discuss options and liaison will check back w/ patient later this afternoon to follow up and see if any decisions have been made. Window to get patient home safely appears to be small, patient may end up needing GIP LOC before arrangements to get home can be completed.  If this occurs we can admit to GIP either here at [facility] or at [facility] 
 
 
 Thank you for the opportunity to be of service to this patient.

## 2020-03-24 NOTE — PROGRESS NOTES
3- CASE MANAGEMENT NOTE: 
Consult received for hospice. I met with the patient who signed a choice letter for [facility] (placed on his chart). I sent the referral to [facility] [facility] thru [address] [place] [address] with a request they contact the daughter and ex-wife to schedule a meeting.  HODA Trujillo, CM

## 2020-03-24 NOTE — PROGRESS NOTES
Problem: Falls - Risk of 
Goal: *Absence of Falls Description: Document Damon Loi Fall Risk and appropriate interventions in the flowsheet. Outcome: Progressing Towards Goal 
Note: Fall Risk Interventions: 
Mobility Interventions: Bed/chair exit alarm, Patient to call before getting OOB, PT Consult for mobility concerns Medication Interventions: Teach patient to arise slowly, Patient to call before getting OOB Elimination Interventions: Call light in reach, Patient to call for help with toileting needs History of Falls Interventions: Evaluate medications/consider consulting pharmacy, Door open when patient unattended Problem: Patient Education: Go to Patient Education Activity Goal: Patient/Family Education Outcome: Progressing Towards Goal 
  
Problem: Risk for Spread of Infection Goal: Prevent transmission of infectious organism to others Description: Prevent the transmission of infectious organisms to other patients, staff members, and visitors. Outcome: Progressing Towards Goal 
  
Problem: Patient Education:  Go to Education Activity Goal: Patient/Family Education Outcome: Progressing Towards Goal 
  
Problem: Pressure Injury - Risk of 
Goal: *Prevention of pressure injury Description: Document Andre Scale and appropriate interventions in the flowsheet. Outcome: Progressing Towards Goal 
Note: Pressure Injury Interventions: Activity Interventions: Increase time out of bed, PT/OT evaluation, Assess need for specialty bed Mobility Interventions: PT/OT evaluation, HOB 30 degrees or less, Assess need for specialty bed Nutrition Interventions: Document food/fluid/supplement intake, Offer support with meals,snacks and hydration Friction and Shear Interventions: HOB 30 degrees or less, Apply protective barrier, creams and emollients Problem: Patient Education: Go to Patient Education Activity Goal: Patient/Family Education Outcome: Progressing Towards Goal 
  
Problem: Chronic Obstructive Pulmonary Disease (COPD) Goal: *Oxygen saturation during activity within specified parameters Outcome: Progressing Towards Goal 
Goal: *Able to remain out of bed as prescribed Outcome: Progressing Towards Goal 
Goal: *Absence of hypoxia Outcome: Progressing Towards Goal 
Goal: *Optimize nutritional status Outcome: Progressing Towards Goal 
  
Problem: Patient Education: Go to Patient Education Activity Goal: Patient/Family Education Outcome: Progressing Towards Goal

## 2020-03-24 NOTE — PROGRESS NOTES
Nutrition Assessment: 
 
RECOMMENDATIONS/INTERVENTION(S):  
1. Continue with Regular diet order to promote PO intakes. Will continue to monitor PO intakes, weight, labs. ASSESSMENT:  
3/24: Remains on Regular diet with good PO intakes, documented as % all meals. MD's reccommending hospice secondary to respiratory function; pt now open to consider hospice. Remains off and on Bipap vs. High flow NC. Labs: NA+ 132, . Meds: statin, Solumedrol, Miralax. Skin remains intact. 3/18: Pt has remained on Regular diet with PO intakes documented as % all meals. Pt confirms good appetite and intakes. Worsening respiratory function, has been off and on Bipap. Labs: Na+ 128, . Meds: statin, Solumedrol, Miralax. Skin intact. 3/12: 82 yo male admitted for acute on chronic respiratory failure. RD assessment for low BMI screen. PMhx: COPD, HTN, HLD, on home O2. Underweight per BMI per age. No weight hx in EMR to assess change, pt denies recent weight change. Regular diet ordered - intakes documented as 75-90% meals. Pt confirms good appetite here and PTA. Denies difficulty chewing/swallowing. Labs: , Na+ 129. Meds: statin, MgSu, Solumedrol. NaCl running at 125ml/hr. Skin intact. Diet Order: Regular 
% Eaten:   
Patient Vitals for the past 72 hrs: 
 % Diet Eaten  
03/23/20 1344 100 % 03/23/20 0805 95 % 03/22/20 1755 100 % 03/22/20 1211 100 % 03/22/20 0853 90 % 03/21/20 1734 95 % 03/21/20 1230 100 % Pertinent Medications: [x] Reviewed Labs: [x] Reviewed Anthropometrics: Height: 5' 9\" (175.3 cm) Weight: 67.4 kg (148 lb 11.2 oz) IBW (%IBW):   ( ) UBW (%UBW):   (  %) BMI: Body mass index is 21.96 kg/m². This BMI is indicative of: 
 [x] Underweight  - per age  [] Normal    [] Overweight    []  Obesity    []  Extreme Obesity (BMI>40) Estimated Nutrition Needs (Based on): 2130 Kcals/day(REE 1446 x AF 1.3 + 250) , 68 g(68-82gm (1-1.2gm/kg/d)) Protein Carbohydrate: At Least 130 g/day  Fluids: 2130 mL/day (1 ml/kcal) Last BM: 3/22   [x]Active     []Hyperactive  []Hypoactive       [] Absent   BS Skin:    [x] Intact   [] Incision  [] Breakdown   [] DTI   [] Tears/Excoriation/Abrasion  []Edema [] Other: Wt Readings from Last 30 Encounters:  
03/24/20 67.4 kg (148 lb 11.2 oz) NUTRITION DIAGNOSES:  
Problem:  Underweight Etiology: related to decreased ability to consume sufficient energy to maintain appropriate weight Signs/Symptoms: as evidenced by BMI 22 (age > 72 years) 3/18: Nutrition Dx continues/worsened - BMI 20.6 
3/24: Nutrition Dx continues - BMI 21.9 NUTRITION INTERVENTIONS: 
Meals/Snacks: General/healthful diet   Supplements: Commercial supplement GOAL:  
Consume > 75% all meals to promote weight gain of 0.5lb within next 5-7 days Cultural, Scientology, or Ethnic Dietary Needs: None EDUCATION & DISCHARGE NEEDS:  
 [x] None Identified 
 [] Identified and Education Provided/Documented 
 [] Identified and Pt declined/was not appropriate [x] Interdisciplinary Care Plan Reviewed/Documented  
 [x] Discharge Needs:   General healthy diet 
 [] No Nutrition Related Discharge Needs NUTRITION RISK:  
Pt Is At Nutrition Risk  [x] No Nutrition Risk Identified  [] PT SEEN FOR:  
 []  MD Consult: []Calorie Count []Diabetic Diet Education []Diet Education []Electrolyte Management []General Nutrition Management and Supplements []Management of Tube Feeding []TPN Recommendations []  RN Referral:  []MST score >=2 
   []Enteral/Parenteral Nutrition PTA []Pregnant: Gestational DM or Multigestation  
              [] Pressure Ulcer 
 
[]  Low BMI      []  Length of Stay       [] Dysphagia Diet         [] Ventilator [x]  Follow-up Previous Recommendations: 
 [x] Implemented          [] Not Implemented          [] Not Applicable Previous Goal: 
 [] Met              [x] Progressing Towards Goal              [] Not Progressing Towards Goal   [] Not Applicable Ginger Mckeon, RD Pager [phone] Phone [phone]

## 2020-03-25 PROBLEM — J96.90 RESPIRATORY FAILURE (HCC): Status: ACTIVE | Noted: 2020-01-01

## 2020-03-25 NOTE — DISCHARGE SUMMARY
See daily note for details Admission date: 03/11 Discharge date (to inpatient hospice): 03/25 Admission diagnosis: acute on chronic resp failure, hypoxia, end stage COPD Consults: Pulm, Palliative care 80 yo hx of HTN, COPD on 4L, anxiety, presented w/ resp failure, hypoxia, COPD. Hospital course also complicated by new afib. Despite hi-flow O2, bipap, IV steroids, and nebs, patient did not make any improvement. His prognosis was poor. The patient and his family met with Palliative/hospice and decided on inpatient hospice.

## 2020-03-25 NOTE — PROGRESS NOTES
[facility] 
[address] [place] [address] 
[phone] Medical Progress Note NAME: Jaime Mccormick :  1950 MRM:  [id number] Date/Time of service: 3/25/2020  9:10 AM 
 
  
Subjective: Chief Complaint:  Patient was personally seen and examined by me during this time period. Chart reviewed. Very dyspneic this AM.  Also c/o back pain. Now agreeable to hospice Objective:  
 
 
Vitals:  
 
 
Last 24hrs VS reviewed since prior progress note. Most recent are: 
 
Visit Vitals /60 (BP 1 Location: Right arm, BP Patient Position: At rest) Pulse (!) 101 Temp 97.4 °F (36.3 °C) Resp 14 Ht 5' 9\" (1.753 m) Wt 67.9 kg (149 lb 9.6 oz) SpO2 92% BMI 22.09 kg/m² SpO2 Readings from Last 6 Encounters:  
20 92% O2 Flow Rate (L/min): 50 l/min Intake/Output Summary (Last 24 hours) at 3/25/2020 7023 Last data filed at 3/25/2020 8991 Gross per 24 hour Intake 720 ml Output 2515 ml Net -1795 ml Exam:  
 
Physical Exam: 
 
Gen:  Disheveled, ill-appearing, mild distress HEENT:  Pink conjunctivae, PERRL, hearing intact to voice, moist mucous membranes Neck:  Supple, without masses, thyroid non-tender Resp:  some accessory muscle use, prolonged expiration, bilateral wheezing Card:  No murmurs, normal S1, S2 without thrills, bruits or peripheral edema Abd:  Soft, non-tender, non-distended, normoactive bowel sounds are present Musc:  No cyanosis or clubbing Skin:  No rashes Neuro:  Cranial nerves 3-12 are grossly intact, follows commands appropriately Psych:  fair insight, oriented to person, place and time, alert Medications Reviewed: (see below) Lab Data Reviewed: (see below) 
 
______________________________________________________________________ Medications:  
 
Current Facility-Administered Medications Medication Dose Route Frequency  methylPREDNISolone (PF) (SOLU-MEDROL) injection 40 mg  40 mg IntraVENous Q8H  
 diazePAM (VALIUM) tablet 2.5 mg  2.5 mg Oral Q8H  
 traMADoL (ULTRAM) tablet 50 mg  50 mg Oral Q6H PRN  
 dilTIAZem CD (CARDIZEM CD) capsule 300 mg  300 mg Oral DAILY  polyethylene glycol (MIRALAX) packet 17 g  17 g Oral BID  
 apixaban (ELIQUIS) tablet 5 mg  5 mg Oral Q12H  pantoprazole (PROTONIX) tablet 40 mg  40 mg Oral ACB  sodium chloride (NS) flush 5-40 mL  5-40 mL IntraVENous Q8H  
 sodium chloride (NS) flush 5-40 mL  5-40 mL IntraVENous PRN  
 ondansetron (ZOFRAN) injection 4 mg  4 mg IntraVENous Q4H PRN  
 nicotine (NICODERM CQ) 21 mg/24 hr patch 1 Patch  1 Patch TransDERmal DAILY  senna (SENOKOT) tablet 17.2 mg  2 Tab Oral QHS  docusate sodium (COLACE) capsule 100 mg  100 mg Oral TID PRN  
 atorvastatin (LIPITOR) tablet 40 mg  40 mg Oral QHS  acetaminophen (TYLENOL) tablet 650 mg  650 mg Oral Q6H PRN  
 dorzolamide (TRUSOPT) 2 % ophthalmic solution 1 Drop  1 Drop Both Eyes TID  ziprasidone (GEODON) capsule 40 mg  40 mg Oral BID WITH MEALS  
 brimonidine (ALPHAGAN) 0.2 % ophthalmic solution 1 Drop  1 Drop Both Eyes TID  budesonide (PULMICORT) 500 mcg/2 ml nebulizer suspension  500 mcg Nebulization BID RT  
 arformoteroL (BROVANA) neb solution 15 mcg  15 mcg Nebulization BID RT  
 melatonin tablet 9 mg  9 mg Oral QHS  prazosin (MINIPRESS) capsule 4 mg  4 mg Oral QHS  albuterol-ipratropium (DUO-NEB) 2.5 MG-0.5 MG/3 ML  3 mL Nebulization Q4H RT  
 guaiFENesin ER (MUCINEX) tablet 1,200 mg  1,200 mg Oral BID  sodium chloride (NS) flush 5-10 mL  5-10 mL IntraVENous PRN Lab Review:  
 
Recent Labs  
  03/23/20 
0730 WBC 11.1 HGB 13.0 HCT 39.3  Recent Labs  
  03/23/20 
0730 * K 4.6 CL 93* CO2 33* * BUN 22* CREA 0.83 CA 8.6 MG 2.1 PHOS 4.0 No results found for: Ruth Raritan Assessment / Plan:  
 
Principal Problem: 80 yo hx of HTN, COPD on 4L, anxiety, presented w/ resp failure, hypoxia, COPD. Complicated by new afib 1) Acute on chronic resp failure/hypoxia: About the same, if not worsening. Still requiring hi-flow O2 and bipap prn. At baseline, on 4L O2. Likely due to end stage COPD. Palliative and Pulm following. Patient is agreeable to hospice. Will need hospice house vs inpatient hospice 2) COPD with acute exacerbation: minimal improvement. Has end stage dz. Cont IV steroids, nebs, incentive spirometry. Pulm following 3) New-onset afib: due to COPD. Cont Dilt, eliquis. Cards was following 4) HTN: cont dilt 5) Anxiety: cont valium, geodon Code: Full Total time spent with patient: 35 min, also spent >50% of time on care coordination **I personally saw and examined the patient during this time period** Care Plan discussed with: Patient, nursing, palliative care Discussed:  Care Plan Prophylaxis:  eliquis Disposition:  Hospice 
        
___________________________________________________ Attending Physician: Beltran Freeman MD

## 2020-03-25 NOTE — PROGRESS NOTES
Bedside shift change report given to Emeli Escobedo (oncoming nurse) by Thalia (offgoing nurse). Report included the following information SBAR, Kardex, Procedure Summary, Intake/Output, MAR, Recent Results and Cardiac Rhythm Sinus tachy.

## 2020-03-25 NOTE — HOSPICE
[facility] 
[phone] Inpatient Nursing Admission Patient Name: Robi Chilel YOB: 1950 Age: 81 y.o. Date of Hospice Admission: 3/25/2020 Hospice Attending Elected by Patient: Mikey Cochran MD 
Primary Care Physician: None Admitting RN: Rudolph Calvert RN : Zahida Puente LCSW Level of Care (GIP/Routine/Respite): GIP Facility of Care: [facility] Patient Room: 333/01 HOSPICE SUMMARY  
ER Visits/ Hospitalizations in past year:  1 Hospice Diagnosis: Respiratory failure ([facility] [address] [J96.90] Summary of Disease Progression Leading to Hospice Diagnosis:  
Robi Chilel is a 81 y.o. with  COPD/GOLD3 on O2 4L continuous, +active tobacco use, HLD, HTN, Anxiety who was admitted on 3/11/2020 from home with a diagnosis of Acute Hypercapnic Respiratory Failure. Patient presented to [facility] ED w/ cc of worsening SOB x 2 weeks, with associated wheezing, productive cough and pain. Patient also endorsed a cough secondary to cough as well as episode of diarrhea earlier day of admission. During hospitalization, patient's O2 needs increased requiring bipap. 3/13 CXR mild interstitial pulmonary edema and small right pleural effusion, ABG abnormal, CO2 83. Soon after admission, developed  Intermittent Afib with RVR. Patient expressed his desire to go home to die but we were not able to reduce his oxygen dependence enough to safely transport him home. Should patient's respiratory status improve, we will work to get him home with hospice. Co-Morbidities:  
Patient Active Problem List  
Diagnosis Code  Acute on chronic respiratory failure with hypoxia (HCC) J96.21  
 COPD with acute exacerbation (HCC) J44.1  PAF (paroxysmal atrial fibrillation) (HCC) I48.0  
 HTN (hypertension) I10  
 Anxiety F41.9  Respiratory failure ([facility] [address] J96.90 Rationale for a prognosis of life expectancy of 6 months or less if the disease follows its normal course (Disease Specific History):  
 
Danny Banegas is a 81 y.o. who was admitted to [facility]. The patient's principle diagnosis of respiratory failure has resulted in dyspnea, anxiety and debility. Functionally, the patient's Palliative Performance Scale has declined over a period of weeks  and is estimated at 20. The patient/family chose comfort measures with the support of Hospice. Patient meets for [facility] LOC as evidenced by dyspnea and anxiety. Prognosis estimated based on 03/25/20 clinical assessment is:  
[] Few to Many Hours [x] Hours to Days  
[] Few to Many Days  
[] Days to Weeks  
[] Few to Many Weeks  
[] Weeks to Months  
[] Few to Many Months ASSESSMENT Patient self-reports:  [x]  Yes    [] No 
 
SYMPTOMS: anxiety, dyspnea SIGNS/PHYSICAL FINDINGS: patient on high flow NC 40L/min FIO2 55%, patient's skin has dusky appearance, patient with shortness of breath and reports feeling anxious. KARNOFSKY: 10 
 
 
CLINICAL INFORMATION Wt Readings from Last 3 Encounters:  
03/25/20 67.9 kg (149 lb 9.6 oz) Ht Readings from Last 3 Encounters:  
03/15/20 5' 9\" (1.753 m) There is no height or weight on file to calculate BMI. There were no vitals taken for this visit. LAB VALUES No results found for this visit on 03/25/20 (from the past 12 hour(s)). No results found for this visit on 03/25/20 (from the past 6 hour(s)). Lab Results Component Value Date/Time Protein, total 5.6 (L) 03/20/2020 01:45 AM  
 Albumin 3.1 (L) 03/20/2020 01:45 AM  
 
 
Currently this patient has: 
[x] Supplemental O2 [x] Peripheral IV  [] PICC    [] PORT  
[] Shetty Catheter [] NG Tube   [] PEG Tube [] Ostomy   
[] AICD: Has ICD been deactivated? [] Yes [] No:______ PLAN 1. Admit to [facility] at the [facility] LOC.   
2. Schedule IV Fentanyl 25 mcg q 2 hours, IV Valium 5 mg q 6 hours, PRN Fentanyl 50 mcg q 15 min, Ativan 1 mg q 15 min PRN and Robinul 0.2 mg q 4 hours PRN. 3. Continue HFNC overnight and begin weaning tomorrow when symptoms are better managed. Hospice Team Frequency Orders: 
Skilled Nurse -   Daily x 7 days /every other day x 7 days  with 5 PRN visits for symptom control. MSW  1 visit for initial assessment/evaluation for family support and need for volunteer services. Dara Obey  1 visit for initial assessment/evaluation for spiritual support. ADVANCE CARE PLANNING (Complete in ACP Flow Sheet) Code Status: DNR Durable DNR: []  Yes  []  No 
Code Status Discussed/Confirmed: yes Preference for Other Life Sustaining Treatment Discussed/Confirmed: 
Hospitalization Preference: [facility] or [facility] Advance Care Planning 3/14/2020 Patient's Healthcare Decision Maker is: Legal Next of Kin Confirm Advance Directive None Patient Would Like to Complete Advance Directive Unable  Service: [x] Yes  []  No      [] Unknown Appropriate for Pinning Ceremony:  [] Yes     [] No 
Sikh: [place]  Home: TBD 
 
DISCHARGE PLANNING 1. Discharge Plan: Should patient's oxygen dependence decrease and his symptoms are manageable with SL meds, patient very much wants to go home. 2. Patient/Family teaching: Hospice serivces. EOL symptoms, EOL medications 3. Response to patient/family teaching: receptive SOCIAL/EMOTIONAL/SPIRITUAL NEEDS Spiritual Issues Identified: Hospital  has visited several times. Patient receptive to visits. Psych/ Social/ Emotional Issues Identified: Patient has strong support from his daughter and his ex wife, with whom he lives. Patient really wanted to go home for EOL and is struggling to accept that he will likely not make it home in light of his respiratory status and poor prognosis.   
 
Caregiver Support: 
[] Provided information on End of Life Care  
[] Material Provided: Gone From My Sight or Journey's End  
 
 CARE COORDINATION Dr. Angelita Guo contacted, discharge to hospice order received Dr. Marielena Fink contacted, agrees to serve as attending provider for hospice and provided verbal certification of terminal illness with life expectancy of 6 months or less. Orders for hospice admission, medications and plan of treatment received. Medication reconciliation completed. MEDS: See medication list below DME: Per hospital 
Supplies: Per hospital 
IDT communication to include MD, SN, SW, CH and support team 
 
ALLERGIES AND MEDICATIONS Allergies: Allergies Allergen Reactions  Hydrocodone Nausea Only  Oxycodone Nausea Only  Percocet [Oxycodone-Acetaminophen] Nausea Only  Vicodin [Hydrocodone-Acetaminophen] Nausea Only Current Facility-Administered Medications Medication Dose Route Frequency  prochlorperazine (COMPAZINE) injection 10 mg  10 mg IntraVENous Q4H PRN  
 LORazepam (ATIVAN) injection 1 mg  1 mg IntraVENous Q15MIN PRN  
 diazePAM (VALIUM) injection 5 mg  5 mg IntraVENous Q6H  
 ketorolac (TORADOL) injection 30 mg  30 mg IntraVENous Q8H PRN  
 glycopyrrolate (ROBINUL) injection 0.2 mg  0.2 mg IntraVENous Q4H PRN  
 bisacodyL (DULCOLAX) suppository 10 mg  10 mg Rectal DAILY PRN  
 fentaNYL citrate (PF) injection 50 mcg  50 mcg IntraVENous Q15MIN PRN  
 fentaNYL citrate (PF) injection 25 mcg  25 mcg IntraVENous Q2H

## 2020-03-25 NOTE — PROGRESS NOTES
[facility] Progress note Name: Baylee Mercedes MRN: [id number] : 1950 Hospital: [address] [facility] Date: 3/25/2020 IMPRESSION:  
· Acute on chronic hypoxemic/hypercapnic respiratory failure. Remains on HFNC · Advanced COPD w/ acute exacerbation · Hyponatremia , stable for now · New onset afib 
· HTN 
· Tobacco abuse RECOMMENDATIONS:  
 
· Remain on continuous HF vs NIV; continue NIV nightly · Continue scheduled duonebs, pulmicort, brovana · Keep steroids at current dose · No additional ABX at this time · Speech eval - functional swallow; aspiration risk due to HF and respiratory status. · Cardizem PO for rate control · On protonix for GERD · Eliquis · Greatly appreciate assistance of Palliative Care team. Patient has agreed to pursue hospice at this point. We will sign off and be available as needed. Please call with questions. Subjective:  
 
 
 
Mr. Selene Galindo is a 70yo male w/ history of chronic hypoxemic respiratory failure (on 4L at baseline), COPD and anxiety who presented to the ER on 3/11 w/ complaints of shortness of breath x 2 weeks, wheezing, cough productive of brown sputum, pleuritic chest pain and sore throat. Denies sick contacts or recent travel. Does report frequent coughing while eating/drinking. Is mostly homebound but gets to the [place]  for appointments and was last there on Monday. He is followed by pulmonary at the [place] -- sees Dr. Perla Payan. Smokes 1ppd (down from 3ppd) since age 15. 
 
3/11 - CTA chest: emphysematous changes. No asdz. Negative for PE *all cultures NGTD *RVP negative Interval history: Afebrile Sats 89% on 50L/65% 3/15 ECHO: EF 55-60%; no valve abnormalities ROS: Feels worse overall. More SOB today. Wants to go home with hospice. Prior to Admission medications Medication Sig Start Date End Date Taking? Authorizing Provider  
predniSONE (DELTASONE) 5 mg tablet Take 5 mg by mouth daily. Yes Provider, Historical  
albuterol (PROVENTIL HFA, VENTOLIN HFA, PROAIR HFA) 90 mcg/actuation inhaler Take 2 Puffs by inhalation every four (4) hours as needed for Wheezing or Shortness of Breath. Yes Provider, Historical  
aspirin delayed-release 81 mg tablet Take 81 mg by mouth daily. Yes Provider, Historical  
atorvastatin (LIPITOR) 80 mg tablet Take 40 mg by mouth nightly. Yes Provider, Historical  
docusate sodium (COLACE) 100 mg capsule Take 100 mg by mouth three (3) times daily as needed for Constipation. Yes Provider, Historical  
senna (SENOKOT) 8.6 mg tablet Take 2 Tabs by mouth nightly. Yes Provider, Historical  
psyllium (METAMUCIL) powd Take 1 Dose by mouth two (2) times a day. 2 teaspoons in liquid twice daily    Yes Provider, Historical  
diazePAM (VALIUM) 5 mg tablet Take 2.5 mg by mouth daily. Indications: anxious   Yes Provider, Historical  
cholecalciferol (VITAMIN D3) (1000 Units /25 mcg) tablet Take 2,000 Units by mouth daily. Yes Provider, Historical  
denosumab (PROLIA) 60 mg/mL injection 60 mg by SubCUTAneous route See Admin Instructions. One syringe subq every 6 months. Due 3/13    Yes Provider, Historical  
ipratropium (ATROVENT) 0.02 % soln 0.5 mg by Nebulization route every four (4) hours as needed (shortness of breath). Yes Provider, Historical  
guaiFENesin (ORGANIDIN) 200 mg tablet Take 200 mg by mouth every six (6) hours as needed for Congestion. Yes Provider, Historical  
metoprolol tartrate (LOPRESSOR) 50 mg tablet Take 50 mg by mouth two (2) times a day. Patient takes in morning (0900) and afternoon (1600)   Yes Provider, Historical  
albuterol (PROVENTIL VENTOLIN) 2.5 mg /3 mL (0.083 %) nebu 2.5 mg by Nebulization route every six (6) hours as needed for Wheezing.    Yes Provider, Historical  
 budesonide-formoteroL (SYMBICORT) 160-4.5 mcg/actuation HFAA Take 2 Puffs by inhalation two (2) times a day. Yes Provider, Historical  
polyethylene glycol (MIRALAX) 17 gram packet Take 17 g by mouth daily as needed for Constipation. Yes Provider, Historical  
pantoprazole (PROTONIX) 40 mg tablet Take 40 mg by mouth daily. Yes Provider, Historical  
melatonin 5 mg tablet Take 10 mg by mouth nightly. Yes Provider, Historical  
prazosin (MINIPRESS) 2 mg capsule Take 4 mg by mouth nightly. Yes Provider, Historical  
ziprasidone (GEODON) 40 mg capsule Take 40 mg by mouth two (2) times daily (with meals). Yes Provider, Historical  
FLUoxetine (PROzac) 20 mg capsule Take 60 mg by mouth daily. Yes Provider, Historical  
pediatric multivitamins chewable tablet Take 1 Tab by mouth daily. Yes Provider, Historical  
brinzolamide-brimonidine 1-0.2 % drps Administer 1 Drop to both eyes three (3) times daily. Yes Provider, Historical  
diazePAM (VALIUM) 5 mg tablet Take 5 mg by mouth nightly. Indications: anxious   Yes Provider, Historical  
 
Allergies Allergen Reactions  Hydrocodone Nausea Only  Oxycodone Nausea Only  Percocet [Oxycodone-Acetaminophen] Nausea Only  Vicodin [Hydrocodone-Acetaminophen] Nausea Only Social History Tobacco Use  Smoking status: Not on file Substance Use Topics  Alcohol use: Not on file No family history on file. Current Facility-Administered Medications Medication Dose Route Frequency  methylPREDNISolone (PF) (SOLU-MEDROL) injection 40 mg  40 mg IntraVENous Q8H  
 diazePAM (VALIUM) tablet 2.5 mg  2.5 mg Oral Q8H  
 dilTIAZem CD (CARDIZEM CD) capsule 300 mg  300 mg Oral DAILY  polyethylene glycol (MIRALAX) packet 17 g  17 g Oral BID  
 apixaban (ELIQUIS) tablet 5 mg  5 mg Oral Q12H  pantoprazole (PROTONIX) tablet 40 mg  40 mg Oral ACB  sodium chloride (NS) flush 5-40 mL  5-40 mL IntraVENous Q8H  
  nicotine (NICODERM CQ) 21 mg/24 hr patch 1 Patch  1 Patch TransDERmal DAILY  senna (SENOKOT) tablet 17.2 mg  2 Tab Oral QHS  atorvastatin (LIPITOR) tablet 40 mg  40 mg Oral QHS  dorzolamide (TRUSOPT) 2 % ophthalmic solution 1 Drop  1 Drop Both Eyes TID  ziprasidone (GEODON) capsule 40 mg  40 mg Oral BID WITH MEALS  
 brimonidine (ALPHAGAN) 0.2 % ophthalmic solution 1 Drop  1 Drop Both Eyes TID  budesonide (PULMICORT) 500 mcg/2 ml nebulizer suspension  500 mcg Nebulization BID RT  
 arformoteroL (BROVANA) neb solution 15 mcg  15 mcg Nebulization BID RT  
 melatonin tablet 9 mg  9 mg Oral QHS  prazosin (MINIPRESS) capsule 4 mg  4 mg Oral QHS  albuterol-ipratropium (DUO-NEB) 2.5 MG-0.5 MG/3 ML  3 mL Nebulization Q4H RT  
 guaiFENesin ER (MUCINEX) tablet 1,200 mg  1,200 mg Oral BID Objective:  
Vital Signs:   
Visit Vitals /59 (BP 1 Location: Right arm, BP Patient Position: At rest) Pulse 90 Temp 97.3 °F (36.3 °C) Resp 21 Ht 5' 9\" (1.753 m) Wt 67.9 kg (149 lb 9.6 oz) SpO2 91% BMI 22.09 kg/m² O2 Device: Hi flow nasal cannula O2 Flow Rate (L/min): 50 l/min Temp (24hrs), Av.7 °F (36.5 °C), Min:97.3 °F (36.3 °C), Max:98.4 °F (36.9 °C) Intake/Output:  
Last shift:      No intake/output data recorded. Last 3 shifts:  1901 -  0700 In: 1440 [P.O.:1440] Out: 3165 [IKTAI:3923] Intake/Output Summary (Last 24 hours) at 3/25/2020 6597 Last data filed at 3/25/2020 4861 Gross per 24 hour Intake 720 ml Output 2515 ml Net -1795 ml Physical Exam:  
General:  Awake, alert, on HF, + mild resp distress Head:  NCAT Eyes:  EOMI, anicteric Nose: Nares clear, HF in place, septum midline Throat: Clear, MM Neck: Trachea midline,supple Lungs:   Diminished, bilateral wheeze L>R posteriorly, increased WOB with speaking Chest wall:  Normal shape, nontender Heart:  RRR Abdomen:   Soft, NT, ND, +bowel sounds Extremities: No edema Pulses: 2+ Skin: Dry, intact, tattoos Lymph nodes: No cervical lymphadenopathy Neurologic: No focal findings, oriented x 3, poor insight into severity of disease Data review: No results found for this or any previous visit (from the past 24 hour(s)). Imaging: 
I have personally reviewed the patients radiographs and have reviewed the reports: CXR 3/18/2020 - hyperinflation, no acute process.   
 
  
Mliss Eris, NP

## 2020-03-25 NOTE — PROGRESS NOTES
1720 Pt complaining of shortness of breath. Bedside shift change report given to Yumi Fajardo (oncoming nurse) by Kendra Gautam (offgoing nurse). Report included the following information SBAR, Kardex, ED Summary, Intake/Output, MAR, Recent Results and Cardiac Rhythm NSR.

## 2020-03-25 NOTE — PROGRESS NOTES
SHIFT CHANGE: 
1930 Bedside and Verbal shift change report given to Tisha ROCHA (oncoming nurse) by Dana Maier (offgoing nurse). Report included the following information SBAR, Kardex, MAR and Recent Results. SHIFT SUMMARY: 
No blood work ordered this am. 
 
END OF SHIFT REPORT: 
0730  Bedside and Verbal shift change report given to Jack Kruse (oncoming nurse) by Kt De Souza (offgoing nurse). Report included the following information SBAR, Kardex, MAR and Recent Results.

## 2020-03-25 NOTE — PROGRESS NOTES
CM follow up: 
 
Order received for Hospice, [facility] [facility] unable to accept patient. Referral placed to [facility] via [employer]. Patient being evaluated, await acceptance. 2:35pm 
Patient accepted by [facility]. AVS updated. Gilmar Reyes, RN, MSN/Care manager

## 2020-03-25 NOTE — H&P
[facility] Good Help to Those in Need 
[phone] Patient Name: Laine Bojorquez YOB: 1950 Date of Provider Hospice Visit: 03/25/20 Level of Care:   [x] General Inpatient (GIP)    [] Routine   [] Respite Current Location of Care: 
[] [address] [place] [address] [x] [facility] [] [address] [] [facility] [] Hospice [facility], patient referred from: 
[] [address] [place] [address] [] [facility] [] [address] [] [facility] [] Home [] Other:  
 
Date of Original Hospice Admission: 3/25/20 Hospice Medical Director at time of admission: Jose Juan Serrato Principle Hospice Diagnosis: Acute respiratory failure Diagnoses RELATED to the terminal prognosis: End-stage COPD, tobacco abuse, COPD exacerbation Other Diagnoses: Faiza Juan Luis Bojorquez is a 81y.o. year old who was admitted to [facility]. Patient is a 80-year-old male with end-stage COPD. He was admitted to [place] on 3/11 with acute on chronic respiratory failure related to COPD exacerbation patient normally on 4 L oxygen at home. hospital course complicated by atrial fibrillation as well as ongoing respiratory failure despite all aggressive measures to include IV steroids, IV antibiotics, nebulizer treatments. Patient was unable to be weaned from high flow nasal cannula at 50 L and/or BiPAP. After much discussion, patient has elected to proceed with inpatient hospice care The patient's principle diagnosis has resulted in days to weeks respiratory failure Refer to LCD Functionally, the patient's Karnofsky and/or Palliative Performance Scale has declined over a period of days to weeks and is estimated at 10-20 the patient is dependent on the following ADLs: All Objective information that support this patients limited prognosis includes: High flow nasal cannula 50 L. The patient/family chose comfort measures with the support of Hospice. HOSPICE DIAGNOSES Active Symptoms: 1. Acute on chronic respiratory failure 2.  End-stage COPD 3. Anxiety secondary to SOB 4. Tobacco abuse PLAN 1. Patient will be admitted to [facility] level care secondary to frequent nursing assessment and medication adjustments. Patient will need to be weaned from high flow and will need to assess symptoms regularly as that is being weaned 2. Start fentanyl 25 mcg every 2 hours scheduled. He had 2 doses of fentanyl prior to transition to hospice which he tolerated. 3. Valium 5 mg IV every 6 hours scheduled for anxiety. He has tolerated Valium in the past. 
4. Comfort meds for all other symptoms 5. Plan has been reviewed with patient, ex wife, and daughter at the bedside 6. Plan reviewed with bedside nurse and hospice liaison. For now, we will get symptoms under control before weaning his high flow further 7.  and SW to support family needs 8. Disposition: Likely will die in the hospital 
9. Hospice Plan of care was reviewed in detail and agree with current plan of care Prognosis estimated based on 03/25/20 clinical assessment is:  
[x] Hours to Days   
[] Days to Weeks   
[] Other: 
 
Communicated plan of care with: Hospice Case Manager; Hospice IDT; Care Team 
 
 GOALS OF CARE Patient/Medical POA stated Goal of Care: Hospice care 
 
[x] I have reviewed and/or updated ACP information in the Advance Care Planning Navigator. This information is available in the [address] link in the patient's chart header. Primary Decision [facility] Agent):   Primary Decision MakeBenedicto Penaloza Daughter - [phone] Resuscitation Status: DNR If DNR is there a Durable DNR on file? : [x] Yes [] No (If no, complete Durable DNR) HISTORY History obtained from: Chart, patient, daughter CHIEF COMPLAINT: Shortness of breath The patient is:  
[x] Verbal 
[] Nonverbal 
[] Unresponsive HPI/SUBJECTIVE: Patient with ongoing shortness of breath. Denies any pain.  
 
 
 REVIEW OF SYSTEMS  
 
 The following systems were: [x] reviewed  [] unable to be reviewed Positive ROS include: 
Constitutional: fatigue, weakness, short of breath Ears/nose/mouth/throat: increased airway secretions Respiratory:shortness of breath, wheezing Gastrointestinal:poor appetite, Neurologic: weakness Psychiatric:anxiety, Endocrine:  
 
Adult Non-Verbal Pain Assessment Score: Denies pain right now Face 
[] 0   No particular expression or smile 
[] 1   Occasional grimace, tearing, frowning, wrinkled forehead 
[] 2   Frequent grimace, tearing, frowning, wrinkled forehead Activity (movement) [] 0   Lying quietly, normal position 
[] 1   Seeking attention through movement or slow, cautious movement 
[] 2   Restless, excessive activity and/or withdrawal reflexes Guarding 
[] 0   Lying quietly, no positioning of hands over areas of body 
[] 1   Splinting areas of the body, tense 
[] 2   Rigid, stiff Physiology (vital signs) 
[] 0   Stable vital signs [] 1   Change in any of the following: SBP > 20mm Hg; HR > 20/minute 
[] 2   Change in any of the following: SBP > 30mm Hg; HR > 25/minute Respiratory 
[] 0   Baseline RR/SpO2, compliant with ventilator 
[] 1   RR > 10 above baseline, or 5% drop SpO2, mild asynchrony with ventilator 
[] 2   RR > 20 above baseline, or 10% drop SpO2, asynchrony with ventilator FUNCTIONAL ASSESSMENT Palliative Performance Scale (PPS):20 
 
 
 PSYCHOSOCIAL/SPIRITUAL ASSESSMENT Active Problems: 
  Respiratory failure ([facility] [address] (3/25/2020) No past medical history on file. No past surgical history on file. Social History Tobacco Use  Smoking status: Not on file Substance Use Topics  Alcohol use: Not on file No family history on file. Allergies Allergen Reactions  Hydrocodone Nausea Only  Oxycodone Nausea Only  Percocet [Oxycodone-Acetaminophen] Nausea Only  Vicodin [Hydrocodone-Acetaminophen] Nausea Only Current Facility-Administered Medications Medication Dose Route Frequency  prochlorperazine (COMPAZINE) injection 10 mg  10 mg IntraVENous Q4H PRN  
 LORazepam (ATIVAN) injection 1 mg  1 mg IntraVENous Q15MIN PRN  
 diazePAM (VALIUM) injection 5 mg  5 mg IntraVENous Q6H  
 ketorolac (TORADOL) injection 30 mg  30 mg IntraVENous Q8H PRN  
 glycopyrrolate (ROBINUL) injection 0.2 mg  0.2 mg IntraVENous Q4H PRN  
 bisacodyL (DULCOLAX) suppository 10 mg  10 mg Rectal DAILY PRN  
 fentaNYL citrate (PF) injection 50 mcg  50 mcg IntraVENous Q15MIN PRN  
 fentaNYL citrate (PF) injection 25 mcg  25 mcg IntraVENous Q2H  
 
 
 PHYSICAL EXAM  
 
Wt Readings from Last 3 Encounters:  
03/25/20 149 lb 9.6 oz (67.9 kg) There were no vitals taken for this visit. Supplemental O2  [x] Yes  [] NO high flow nasal cannula at 50 L Last bowel movement:  
 
Currently this patient has: 
[x] Peripheral IV [] PICC  [] PORT [] ICD [] Shetty Catheter [] NG Tube   [] PEG Tube   
[] Rectal Tube [] Drain 
[] Other:  
 
Constitutional: Thin, ill-appearing, still alert and oriented but anxious Eyes: Reactive ENMT: Dry 
Cardiovascular: Irregular regular, tachycardia Respiratory: mild-moderate distress with accessory muscle use Gastrointestinal: soft Musculoskeletal:muscle wasting Skin:unremarkable Neurologic:nonfocal 
Psychiatric: anxious Other:  
 
 
Pertinent Lab and or Imaging Tests: 
Lab Results Component Value Date/Time Sodium 132 (L) 03/23/2020 07:30 AM  
 Potassium 4.6 03/23/2020 07:30 AM  
 Chloride 93 (L) 03/23/2020 07:30 AM  
 CO2 33 (H) 03/23/2020 07:30 AM  
 Anion gap 6 03/23/2020 07:30 AM  
 Glucose 197 (H) 03/23/2020 07:30 AM  
 BUN 22 (H) 03/23/2020 07:30 AM  
 Creatinine 0.83 03/23/2020 07:30 AM  
 BUN/Creatinine ratio 27 (H) 03/23/2020 07:30 AM  
 GFR est AA >60 03/23/2020 07:30 AM  
 GFR est non-AA >60 03/23/2020 07:30 AM  
 Calcium 8.6 03/23/2020 07:30 AM  
 
Lab Results Component Value Date/Time  Protein, total 5.6 (L) 03/20/2020 01:45 AM  
 Albumin 3.1 (L) 03/20/2020 01:45 AM  
 
   
 
Total time:  
Counseling / coordination time:  
> 50% counseling / coordination?:

## 2020-03-25 NOTE — PROGRESS NOTES
CM update: 
 
Patient transferred to inpatient hospice. CM available as needed. Asim Soto, RN, MSN/Care manager

## 2020-03-25 NOTE — PROGRESS NOTES
Bedside and Verbal shift change report given to A [place] RN (oncoming nurse) by Mukesh Hammer (offgoing nurse). Report included the following information SBAR, Kardex, ED Summary, Procedure Summary, Intake/Output and MAR.  
 
1100- prn fentanyl given, goal to wean O2 to transition to home hospice. 1104- Hi flow weaned to 40L 55% 1153- Bedside and Verbal shift change report given to [facility] RN (oncoming nurse) by Johnson Gage RN (offgoing nurse). Report included the following information SBAR, Kardex, ED Summary, Intake/Output, MAR and Recent Results.

## 2020-03-26 NOTE — PROGRESS NOTES
Nurse informed  patient is dying and might benefit from 's visit. Pt's daughter responded to 's gentle tap on door, she recognizes  from previous visits. Pt was lying in his bed unresponsive. Daughter indicated that they wanted a family time with dad, mom was on her  [de-identified], and thus  was not needed at this time.  advised family that spiritual care can still be reached if needed. Daughter expressed appreciation.

## 2020-03-26 NOTE — PROGRESS NOTES
Bedside and Verbal shift change report given to Abby Barnes RN (oncoming nurse) by Sarai Cody RN (offgoing nurse). Report included the following information SBAR and Kardex.

## 2020-03-26 NOTE — PROGRESS NOTES
1730 Verbal shift change report given to Conchita Antonio RN and Adelso Palmer RN (oncoming nurse) by Davidson Stewart RN (offgoing nurse). Report included the following information SBAR, MAR and Accordion. 1737 patient had agonal respiration 8-9, HR 40s, O2 Sat 40s  dusky color, [place] ex wife and daughter Javon Faklouie at bedside. Deferred assessment for comfort. PRN medications given. 1823  patient had agonal respiration 6, HR 30s, O2 Sat 20-30s, dusky color, [place] ex wife and daughter Javon Fake no longer at bedside. Cherie Chris RN spoke with Eliel Alexander via the phone and advised death could be imminent. 1930 Bedside and Verbal shift change report given to Cristal Mckeon RN and Celio Rucker (oncoming nurse) by Suly Wayne RN (offgoing nurse). Report included the following information SBAR, Kardex, Intake/Output and MAR.

## 2020-03-26 NOTE — PROGRESS NOTES
Problem: Breathing Pattern - Ineffective Goal: *Use of effective breathing techniques Outcome: Progressing Towards Goal 
  
Problem: Pain Goal: *Control of acute pain Outcome: Progressing Towards Goal

## 2020-03-26 NOTE — PROGRESS NOTES
[employer] Good Help to Those in Need 
[phone] Patient Name: Aretha Barnes YOB: 1950 Date of Provider Hospice Visit: 03/26/20 Level of Care:   [x] General Inpatient (GIP)    [] Routine   [] Respite Current Location of Care: 
[] [facility] [x] [facility] [] [facility] [] [facility] [] [facility], patient referred from: 
[] [facility] [] [facility] [] [facility] [] [facility] [] Home [] Other:  
 
Date of Original Hospice Admission: 3/25/20 Hospice Medical Director at time of admission: Aston Solano Principle Hospice Diagnosis: Acute respiratory failure Diagnoses RELATED to the terminal prognosis: End-stage COPD, tobacco abuse, COPD exacerbation Other Diagnoses: Patricia Menjivar Aretha Barnes is a 81y.o. year old who was admitted to [facility]. Patient is a 27-year-old male with end-stage COPD. He was admitted to [facility] on 3/11 with acute on chronic respiratory failure related to COPD exacerbation patient normally on 4 L oxygen at home. hospital course complicated by atrial fibrillation as well as ongoing respiratory failure despite all aggressive measures to include IV steroids, IV antibiotics, nebulizer treatments. Patient was unable to be weaned from high flow nasal cannula at 50 L and/or BiPAP. After much discussion, patient has elected to proceed with inpatient hospice care The patient's principle diagnosis has resulted in days to weeks respiratory failure Refer to LCD Functionally, the patient's Karnofsky and/or Palliative Performance Scale has declined over a period of days to weeks and is estimated at 10-20 the patient is dependent on the following ADLs: All Objective information that support this patients limited prognosis includes: High flow nasal cannula 50 L. The patient/family chose comfort measures with the support of Hospice. HOSPICE DIAGNOSES Active Symptoms: 1. Acute on chronic respiratory failure 2.  End-stage COPD 3. Anxiety secondary to SOB 4. Tobacco abuse PLAN 1. Patient will continue GIP level of care. Patient remains on 40 L / 65% high flow nasal cannula. He is a little less responsive but arouses easily. Still having some shortness of breath. 2. We continued fentanyl but as reassessment was done with weaning, we will change to Dilaudid 1 mg IV every 2 hours scheduled since this last longer. The plan will be to wean his high flow nasal cannula approximately 10 L every 1-2 hours with frequent nursing assessments and adjustment in medication as needed. 3. Continue Valium 5 mg IV every 6 hours scheduled for anxiety. He has tolerated Valium in the past.  Ativan as needed 4. Comfort meds for all other symptoms 5. Plan has been reviewed with patient and daughter 6. Plan reviewed with bedside nurse and hospice liaison 7.  and SW to support family needs 8. Disposition: Likely will die in the hospital 
9. Hospice Plan of care was reviewed in detail and agree with current plan of care Prognosis estimated based on 03/26/20 clinical assessment is:  
[x] Hours to Days   
[] Days to Weeks   
[] Other: 
 
Communicated plan of care with: Hospice Case Manager; Hospice IDT; Care Team 
 
 GOALS OF CARE Patient/Medical POA stated Goal of Care: Hospice care 
 
[x] I have reviewed and/or updated ACP information in the Advance Care Planning Navigator. This information is available in the [facility] [address] link in the patient's chart header. Primary Decision [facility] Agent):   Primary Decision MakerSambrose Perez - Daughter - [phone] Resuscitation Status: DNR If DNR is there a Durable DNR on file? : [x] Yes [] No (If no, complete Durable DNR) HISTORY History obtained from: Chart, patient, daughter CHIEF COMPLAINT: Shortness of breath The patient is:  
[x] Verbal 
[] Nonverbal 
[] Unresponsive HPI/SUBJECTIVE: Patient with ongoing shortness of breath. Denies any pain. 3/26 patient still arouses. Opens eyes talk a little bit but still some evidence of shortness of breath REVIEW OF SYSTEMS The following systems were: [x] reviewed  [] unable to be reviewed Positive ROS include: 
Constitutional: fatigue, weakness, short of breath Ears/nose/mouth/throat: increased airway secretions Respiratory:shortness of breath, wheezing Gastrointestinal:poor appetite, Neurologic: weakness Psychiatric:anxiety, Endocrine:  
 
Adult Non-Verbal Pain Assessment Score: Denies pain right now Face 
[] 0   No particular expression or smile 
[] 1   Occasional grimace, tearing, frowning, wrinkled forehead 
[] 2   Frequent grimace, tearing, frowning, wrinkled forehead Activity (movement) [] 0   Lying quietly, normal position 
[] 1   Seeking attention through movement or slow, cautious movement 
[] 2   Restless, excessive activity and/or withdrawal reflexes Guarding 
[] 0   Lying quietly, no positioning of hands over areas of body 
[] 1   Splinting areas of the body, tense 
[] 2   Rigid, stiff Physiology (vital signs) 
[] 0   Stable vital signs [] 1   Change in any of the following: SBP > 20mm Hg; HR > 20/minute 
[] 2   Change in any of the following: SBP > 30mm Hg; HR > 25/minute Respiratory 
[] 0   Baseline RR/SpO2, compliant with ventilator 
[] 1   RR > 10 above baseline, or 5% drop SpO2, mild asynchrony with ventilator 
[] 2   RR > 20 above baseline, or 10% drop SpO2, asynchrony with ventilator FUNCTIONAL ASSESSMENT Palliative Performance Scale (PPS):20 
 
 
 PSYCHOSOCIAL/SPIRITUAL ASSESSMENT Active Problems: 
  Respiratory failure ([address] (3/25/2020) No past medical history on file. No past surgical history on file. Social History Tobacco Use  Smoking status: Not on file Substance Use Topics  Alcohol use: Not on file No family history on file. Allergies Allergen Reactions  Hydrocodone Nausea Only  Oxycodone Nausea Only  Percocet [Oxycodone-Acetaminophen] Nausea Only  Vicodin [Hydrocodone-Acetaminophen] Nausea Only Current Facility-Administered Medications Medication Dose Route Frequency  HYDROmorphone (DILAUDID) injection 1 mg  1 mg IntraVENous Q2H  
 HYDROmorphone (DILAUDID) injection 1 mg  1 mg IntraVENous Q15MIN PRN  prochlorperazine (COMPAZINE) injection 10 mg  10 mg IntraVENous Q4H PRN  
 LORazepam (ATIVAN) injection 1 mg  1 mg IntraVENous Q15MIN PRN  
 diazePAM (VALIUM) injection 5 mg  5 mg IntraVENous Q6H  
 ketorolac (TORADOL) injection 30 mg  30 mg IntraVENous Q8H PRN  
 glycopyrrolate (ROBINUL) injection 0.2 mg  0.2 mg IntraVENous Q4H PRN  
 bisacodyL (DULCOLAX) suppository 10 mg  10 mg Rectal DAILY PRN  
 
 
 PHYSICAL EXAM  
 
Wt Readings from Last 3 Encounters:  
03/25/20 149 lb 9.6 oz (67.9 kg) Visit Vitals /67 (BP 1 Location: Right arm, BP Patient Position: At rest) Pulse 76 Temp 97.9 °F (36.6 °C) Resp 18 SpO2 (!) 87% Supplemental O2  [x] Yes  [] NO high flow nasal cannula at 40 liers Last bowel movement:  
 
Currently this patient has: 
[x] Peripheral IV [] PICC  [] PORT [] ICD [] Shetty Catheter [] NG Tube   [] PEG Tube   
[] Rectal Tube [] Drain 
[] Other:  
 
Constitutional: Thin, ill-appearing, will arouse Eyes: Reactive ENMT: Dry 
Cardiovascular: Irregular regular, tachycardia Respiratory: mild-moderate distress with accessory muscle use Gastrointestinal: soft Musculoskeletal:muscle wasting Skin:unremarkable Neurologic:nonfocal 
Psychiatric: anxious Other:  
 
 
Pertinent Lab and or Imaging Tests: 
Lab Results Component Value Date/Time  Sodium 132 (L) 03/23/2020 07:30 AM  
 Potassium 4.6 03/23/2020 07:30 AM  
 Chloride 93 (L) 03/23/2020 07:30 AM  
 CO2 33 (H) 03/23/2020 07:30 AM  
 Anion gap 6 03/23/2020 07:30 AM  
 Glucose 197 (H) 03/23/2020 07:30 AM  
 BUN 22 (H) 03/23/2020 07:30 AM  
 Creatinine 0.83 03/23/2020 07:30 AM  
 BUN/Creatinine ratio 27 (H) 03/23/2020 07:30 AM  
 GFR est AA >60 03/23/2020 07:30 AM  
 GFR est non-AA >60 03/23/2020 07:30 AM  
 Calcium 8.6 03/23/2020 07:30 AM  
 
Lab Results Component Value Date/Time  Protein, total 5.6 (L) 03/20/2020 01:45 AM  
 Albumin 3.1 (L) 03/20/2020 01:45 AM  
 
   
 
Total time:  
Counseling / coordination time:  
> 50% counseling / coordination?:

## 2020-03-26 NOTE — PROGRESS NOTES
Shift Change:  
 
Bedside and Verbal shift change report given to Rena Dasilva and Tien Meadows (oncoming nurse) by Sonia and April (offgoing nurse). Report included the following information SBAR, Kardex and Recent Results. Shift Summary: 
 
1900: Day shift nurses had just spoken to pnt's daughter prior to shift change. Family had recently left and questions were answered. Numbers for daughter and ex-wife given as well as for Dr. Yordy Kraus. Pnt agonal breathing but is calm and resting quietly. 2348: Patient no longer breathing, no heart tones. Dr. Devan Mckeon on call and notified. 2355: Time of death called. 2358: RN supervisor called. 2359:  called and updated. Stated family is not at bedside. 0002: [facility] spoke with Brissa Cooley and she stated he may be a candidate for tissue and eye donation and he is safe to send to the [facility].

## 2020-03-26 NOTE — [facility] IDG CHAPLAIN NOTES
Patient: Janeth Bryant Date: 03/26/20 Time: 8:05 AM 
 
[facility]  Notes Mr. Lakeisha Magana was discussed today during the [facility] team [facility] meeting. The care of plan is to provide spiritual support to the patient and his family through offers of prayer and life review of Mr. Yany Zaragoza presence in their lives. Signed by: Reyna Henley

## 2020-03-26 NOTE — HOSPICE
[facility] Good Help to Those in Need 
[phone] Social Work Admission Note Patient Name: Anum Agee YOB: 1950 Age: 81 y.o. Date of Visit: 20 Facility of Care: [facility] Patient Room: 333/01 Hospice Attending: Karl Edgar MD 
Hospice Diagnosis: Respiratory failure [facility]) [J96.90] Level of Care:  
 [x]  GIP []  Respite 
 []  Routine NARRATIVE  
LCSW met with patient and daughter along with RN Liaison at bedside. Hospice team weaning patient's 02 and providing comfort care. Daughter holding patient's hand and stroking hair. She was tearful throughout visit and concerned about patient's pain. Patient consistently stated that he was at an 8/10 pain in his abdomen. PRNs given and meds changed for future orders. We discussed nonverbal signs of pain and letting patient rest as well. LCSW addressed final arrangements but daughter requested that this be discussed after he passes and she will speak with her mother about plans. After last med given, she requested time alone with patient. Team will remain available for support. ADVANCE CARE PLANNING Code Status: DNR Durable DNR: _ Yes  _ No 
Advance Care Planning 3/26/2020 Patient's Healthcare Decision Maker is: - Confirm Advance Directive None Patient Would Like to Complete Advance Directive No  
 
 
Relationship Status: 
[]  Single    
[]       
[]     
[]  Domestic Partner    
[]  / 
[]  Common Law 
[]   
[]  Unknown If in a relationship, name of partner/spouse: 
Duration of relationship: 
 
Restorationist: No Worship on file  Home:TBD Resources Provided: supportive counseling Social Work Initial Assessment Gender: 
male Race/Ethnicity: (adelso all that apply) []  [place] [facility] or [place] Native 
[]  Louisa 
[]  [place] or [place] 
[]   or  
[]  Native Ayad or Dl 
[x]  Jamie Serrato 
[]  Unknown 
  
  Service:   
[]  Yes  
[]  No      
[x]  Unknown Appropriate for Pinning Ceremony:  
[]  Yes     
[]  No 
Is patient using VA benefits? []  Yes     
[]  No 
  
Primary Language: English 
[]   Needed 
[]   utilized during visit Ability to express thoughts/needs/feelings 
[]  Expressed thoughts/feelings/needs without difficulty [x]  Requires extra time and cuing 
[]  Speech limited single words 
[]  Uses only gestures (eye, blinking eye or head movement/pointing) []  Unable to express thoughts/feelings/needs (speech unintelligible or inappropriate) []  Unresponsive Notes:  
  
Mental Status: 
[x]  Alert-oriented to:   
 []  Person   
 []  Place   
 []  Time 
[]  Comatose-responds to:  
 []   Verbal stimuli  
 []  Tactile stimuli  
 []  Painful stimuli 
[]  Forgetful 
[]  Disoriented/Confused 
[x]  Lethargic 
[]  Agitated 
[]  Other (specify):   
Notes:  
  
Patients description of Illness/Current Health Status:   
[x]  Patient unable to discuss 
[]  Patient unwilling to discuss 
[]  (Specify) Knowledge/Understanding of Disease Process Patient:  
 []  Demonstrates knowledge/understanding of disease process 
 []  Demonstrates knowledge/understanding of treatment plan 
 []  Demonstrates knowledge/understanding of prognosis []  Demonstrates acceptance of prognosis []  Demonstrates knowledge/understanding of resuscitation status 
 []  Other (specify) Caregiver: 
 [x]  Demonstrates knowledge/understanding of disease process [x]  Demonstrates knowledge/understanding of treatment plan 
 [x]  Demonstrates knowledge/understanding of prognosis [x]  Demonstrates acceptance of prognosis []  Demonstrates knowledge/understanding of resuscitation status 
 []  Other (specify) Notes:  
  
Patients living arrangement/care setting: 
Use the PRIOR COLUMN when the PATIENTS current health status necessitated a change in his/her primary residence. Prior Current Response [x]             []    Patients own home/residence []             []    Home of family member/friend []             []    Boarding home  
           []             []    Assisted living facility/[facility] center []             [x]    Hospital/Acute care facility []             []    Skilled nursing facility []             []    Long term care facility/Nursing home  
           []             []    Hospice in Patient Primary Caregiver: 
[]  No Primary Caregiver Name of Primary Caregiver: Be Morocho [phone] Relationship or Primary Caregiver:  
 []  Spouse/Significant other     
 [x]  Natural Child      
 []  Step child     
 []  Sibling 
 []  Parent 
 []  Friend/Neighbor 
 []  Community/Buddhism Volunteer 
 []  Paid help 
 []  Other (specify):___________ Notes:   
  
Family members/Significant others: 
Name: 
Relationship: 
Phone Number: Actively involved in care? []  Yes  []  No 
 
Name: 
Relationship: 
Phone Number: Actively involved in care? []  Yes  []  No 
 
Name: 
Relationship: 
Phone Number: Actively involved in care? []  Yes  []  No 
 
Social support systems: (select ONE best description) []  Excellent social support system which includes three or more family members or friends 
[x]  Good social support system which includes two or less members or friends 
[]  [address] support which includes one family member or friend 
[]  Poor social support; no family members or friends; basically ALONE Notes:  
  
Emotional Status: (adelso all that apply) Patient Caregiver Response  
              []                [x]    Mood/Affect stable and appropriate    
              []                []    Angry  
              []                []    Anxious []                []    Apprehensive []                []    Avoidant  
              []                []    Clinging []                []    Depressed  
              []                []    Distraught  
              []                []    Elated []                []    Euphoric  
              []                []    Fearful  
              []                []    Flat Affect  
              []                []    Helpless []                []    Hostile []                []    Impulsive []                []    Irritable  
              []                []    Labile  
              []                []    Manic [x]                []    Restlessness [x]                [x]    Sad  
              []                []    Suspicious []                [x]    Tearful  
              []                []    Withdrawn Notes:  
 
Coping Skills (strengths/weakness):  
 Patient: Coping Skills (strength/weakness):  
 Family/caregiver (strength/weakness): 
  
[place] of care (adelso all that apply):    
[x]  No burden evident  
[]  Family must administer medications  
[]  Illness causing financial strain  
[]  Family/Support feels overwhelmed  
[]  Family/Support sleep disturbed with patients care  
[]  Patients care causes extra physical stress  of death 
[]  Illness causes changes in family lifestyle 
[]  Illness impacting family/support employment 
[]  Family experiencing increased time demands 
[]  Patients behavior endangers family 
[]  Denial of patients illness 
[]  Concern over outcome of illness/fear 
[]  Patients behavior embarrassing to family Notes:  
  
Risk Factors: (adelso all that apply):   
[x]  No burden evident  
[]  Alcohol abuse 
[]  Financial resources inadequate to meet basic needs (food/house/etc) []  Financial resources inadequate to meet health care needs (supplies/equipment/medications) 
[]  Food/nutrition resources inadequate 
[]  Home environment unsafe/inadequate for home care []  Homicidal risk 
[]  Lives alone or without concerned relatives 
[]  Multiple medications/complex schedule 
[]  Physical limitations increase likelihood of falls 
[]  Plan of care/treatments complicated 
[]  Substance use/abuse 
[]  Suicidal risk 
[]  Visual impairment threatens safety/ability to perform self-care 
[]  Other (specify): 
  
Abuse/Neglect (actual/potential risks): 
[x]  No signs of abuse/neglect 
[]  History of abuse/neglect                 []  [id number]          []  Sexual 
[]  History of domestic violence 
[]  Lacks adequate physical care 
[]  Lacks emotional nurturing/support 
[]  Lacks appropriate stimulation/cognitive experiences 
[]  Left alone inappropriately 
[]  Lacks necessary supervision 
[]  Inadequate or delayed medical care 
[]  Unsafe environment (i.e guns/drug use/history of violence in the home/etc.) []  Bruising or other physical signs of injury present 
[]  Other (specify): 
Notes:  
[]  Refer to child/adult protective services Current Sources of Stress (in Addition to Current Illness):  
[x]  None reported 
[]  Bills/Debt   
[]  Career/Job change   
[]   (short term)   
[]   (long term)   
[]  Death of a child (recent)   
[]  Death of a parent (recent)  
[]  Death of a spouse (recent)  
[]  Employment status changed  
[]  Family discord   
[]  Financial loss/Inadequate inther (specify):come 
[]  Job loss 
[]  Legal issues unresolved 
[]  Lifestyle change 
[]  Marital discord 
[]  Marriage within the last year 
[]  Paperwork (insurance/legal/etc) overwhelming 
[]  Separation/Divorce 
[]  Other (specify): 
Notes:  
  
Current Community Resources Being Utilized 1. [facility] care at [facility] Interventions/Plan of Care 1. Assess social and emotional factors related to coping with end of life issues 2. Community resource planning/referral  
3. Relocation to different care setting if/when symptoms stabilize Discharge Planning 1. Should patient stabilize will dc home 2. Final arrangements TBD MSW Assessment Completed by: Gee Irizarry 03/26/20 Time In: 1215 Time Out:1p

## 2020-03-26 NOTE — [facility] IDG OTHER NOTES
BEREAVEMENT NOTE -- ADMISSION: 
During the interdisciplinary group meeting on 3/26/2020, the primary care team reviewed the patient's admission, and bereavement was discussed. It was confirmed that daughter Vonnie Kilgore is the primary bereaved at low risk level due to no significant risk factors.

## 2020-03-26 NOTE — PROGRESS NOTES
Problem: Falls - Risk of 
Goal: *Absence of Falls Description: Document Alejandro Jones Fall Risk and appropriate interventions in the flowsheet. Outcome: Progressing Towards Goal 
Note: Fall Risk Interventions: 
Mobility Interventions: Bed/chair exit alarm, Communicate number of staff needed for ambulation/transfer Mentation Interventions: Adequate sleep, hydration, pain control, Gait belt with transfers/ambulation Medication Interventions: Patient to call before getting OOB, Teach patient to arise slowly Elimination Interventions: Bed/chair exit alarm, Call light in reach, Patient to call for help with toileting needs History of Falls Interventions: Bed/chair exit alarm, Door open when patient unattended, Investigate reason for fall, Room close to nurse's station Problem: Patient Education: Go to Patient Education Activity Goal: Patient/Family Education Outcome: Progressing Towards Goal 
  
Problem: Pressure Injury - Risk of 
Goal: *Prevention of pressure injury Description: Document Andre Scale and appropriate interventions in the flowsheet. Outcome: Progressing Towards Goal 
Note: Pressure Injury Interventions: 
Sensory Interventions: Assess changes in LOC, Minimize linen layers, Monitor skin under medical devices, Pad between skin to skin Moisture Interventions: Absorbent underpads, Internal/External urinary devices, Maintain skin hydration (lotion/cream), Minimize layers, Moisture barrier Activity Interventions: Pressure redistribution bed/mattress(bed type), Chair cushion Mobility Interventions: Pressure redistribution bed/mattress (bed type) Nutrition Interventions: Offer support with meals,snacks and hydration, Discuss nutritional consult with provider, Document food/fluid/supplement intake Friction and Shear Interventions: Apply protective barrier, creams and emollients, Minimize layers Problem: Patient Education: Go to Patient Education Activity Goal: Patient/Family Education Outcome: Progressing Towards Goal 
  
Problem: Breathing Pattern - Ineffective Goal: *Use of effective breathing techniques Outcome: Progressing Towards Goal 
  
Problem: Pain Goal: *Control of acute pain Outcome: Progressing Towards Goal 
  
Problem: Pressure Injury - Risk of 
Goal: *Prevention of pressure injury Outcome: Progressing Towards Goal 
  
Problem: Grieving Goal: *Able to express feelings of grief Outcome: Progressing Towards Goal 
Goal: *Able to identify stages of grieving process Outcome: Progressing Towards Goal 
  
Problem: Mood - Altered Goal: *Alleviation of anxiety and depressive symptoms Outcome: Progressing Towards Goal

## 2020-03-26 NOTE — PROGRESS NOTES
Assumed care of patient from [facility]. 1715 full assessment deferred as patient is hospice patient. Patient slightly gasping for air, will give PRN meds to ease breathing/anxiety. Patient not responsive to verbal command. No spontaneous eye opening. Hospice RN Dada Garcia at bedside, will wean patient onto 6L [facility] assuming care of patient.

## 2020-03-26 NOTE — HOSPICE
[facility] [employer] [facility] in [facility] 
[phone] GIP Daily Nursing Note Patient Name: Bobby Starr YOB: 1950 Age: 81 y.o. Date of Visit: 03/26/20 Facility of Care: [facility] Patient Room: 333/01 Hospice Attending: Carlitos Stiles MD 
Hospice Diagnosis: Respiratory failure [facility]) [J96.90] Level of Care: GIP Current GIP Symptoms 1. Dyspnea 2. Pain ASSESSMENT & PLAN Must update Plan of Care including visit frequencies for IDT members 1. Pt taken off BiPap and placed on 4L O2 HiFlo, plan to slowly wean off hiflow w/ aggressive symptom management. 2. Per Dr. Leigh Ann Galdamez: Decrease hiflo by 10L per hour. Medicate prior and monitor closely for development of symptoms 3. Provide support to patient and daughter 4. Remain GIP 5. Medications scheduled and PRN for symptom management Spiritual Interventions: hospice chaplain visits as indicated Psych/ Social/ Emotional Interventions: patient is alert and aware of current situation, hospice SW visit today Care Coordination Needs:  Continue to provide updates to hospice team 
 
Care plan and New Orders discussed / approved with Dr. Leigh Ann Galdamez MD. Description History and Chart Review If this is initial GIP note must document RN assessment/MD communication in previous setting. Specifically document nursing/medication needs in last 24 hours to support GIP care Narrative History of last 24 hours that demonstrates care cannot be provided in another setting: 
Pt taken off BiPap and placed on 4L O2 HiFlo, plan to slowly wean off hiflow w/ aggressive symptom management. What has been done to control the patient's symptoms in the last 24 hours? Scheduled Valium q6hr and Fentanyl q2h, Prn medications as ordered Does the patient currently require IV medications? yes Does the patient currently require scheduled medications? yes Does the patient currently require a PCA? no 
 
 
 Supporting documentation for GIP need for pain control: 
[x] Frequent evaluation by a doctor, nurse practitioner, nurse  
[x] Frequent medication adjustment   
[x] IVs that cannot be administered at home  
[] Aggressive pain management  
[] Complicated technical delivery of medications Supporting documentation for GIP need for symptom control: 
[x]  Sudden decline necessitating intensive nursing intervention 
[]  Uncontrolled / intractable nausea or vomiting  
[]  Pathological fractures 
[]  Advanced open wounds requiring frequent skilled care 
[] Unmanageable respiratory distress 
[] New or worsening delirium  
[] Delirium with behavior issues: Is 24 hour caregiver present due to safety concerns with agitation? (yes/no) [x] Imminent death  with skilled nursing needs documented above DISCHARGE PLANNING 1. Discharge Plan: will likely  in the hospital however if improves can transition home 2. Patient/Family teaching: EOL symptom management 3. Response to patient/family teaching: verbalized understanding ASSESSMENT   
KARNOFSKY: 10 Prognosis estimated based on 20 clinical assessment is:  
[x] Few to Many Hours [] Hours to Days  
[] Few to Many Days  
[] Days to Weeks  
[] Few to Many Weeks  
[] Weeks to Months  
[] Few to Many Months Quality Measure: Patient self-reports:  [x] Yes    [] No 
 
 
CLINICAL INFORMATION Patient Vitals for the past 12 hrs: 
 Temp Pulse Resp BP SpO2  
20 0815 97.9 °F (36.6 °C) 76 18 158/67 (!) 87 % 20 0700  70    Currently this patient has: 
[x] Supplemental O2 [x] IV   
[] PICC [] PORT  
[] NG Tube   
[] PEG Tube  
[] Ostomy    
[] Shetty draining _______ urine 
[] Other:  
 
SIGNS/PHYSICAL FINDINGS Skin (including wound): 
[x] Warm, dry, supple, intact and color normal for race 
[] Warm  
[] Dry  
[] Cool    
[] Clammy      
[] Diaphoretic Turgor 
 [] Normal 
 [] Decreased Color: 
 [] Pink 
 [] Pale [] Cyanotic 
 [] Erythema 
 [] Jaundice [] Normal for Race 
[]  Wounds: 
 
Neuro: 
[x] Lethargy 
[] Restlessness / agitation 
[] Confusion / delirium 
[] Hallucinations 
[] Responds to maximal stimulation 
[] Unresponsive 
[] Seizures Cardiac: 
[] Dyspnea on Exertion 
[] JVD [] Murmur 
[] Palpitations [] Hypotension 
[] Hypertension 
[] Tachycardia [] Bradycardia 
[] Irregular HR 
[] Pulses Decreased 
[] Pulses Absent 
[] Edema:       (Location, Grade and Pitting) [] Mottling:      (Location) Respiratory: 
Breath sounds:  
 [x] Diminished 
 [] Wheeze 
 [] Rhonchi 
 [] Rales  
[] Even and unlabored 
[x] Labored:           
[] Cough 
 [] Non Productive 
 [] Productive 
  [] Description:          
[] Deep suctioned  
[] O2 at ___ LPM 
[] High flow oxygen greater than 10 LPM 
[] Bi-Pap GI 
[] Abdomen (describe) [] Ascites 
[] Nausea 
[] Vomiting 
[] Incontinent of bowels 
[] Bowel sounds (yes/no) [] Diarrhea 
[] Constipation (see above including last bowel movement) [] Checked for impaction 
[] Last BM Nutrition Diet:__________ Appetite:  
[] Good  
[x] Fair  
[] Poor  
[] Tube Feeding  
[] Voiding 
[] Incontinent [x] Shetty Musculoskeletal 
[] Balance/[place] Unsteady  
[] Weak Strength:  
 [] Normal  
 [] Limited [x] Decreasing Activities:  
 [] Up as tolerated 
 [] Bedridden  
 [] Specify: 
 
SAFETY [x] 24 hr. Caregiver [x] Side rails ? [x] Hospital bed  
[x] Reviewed Falls & Safety ALLERGIES AND MEDICATIONS Allergies: Allergies Allergen Reactions  Hydrocodone Nausea Only  Oxycodone Nausea Only  Percocet [Oxycodone-Acetaminophen] Nausea Only  Vicodin [Hydrocodone-Acetaminophen] Nausea Only Current Facility-Administered Medications Medication Dose Route Frequency  prochlorperazine (COMPAZINE) injection 10 mg  10 mg IntraVENous Q4H PRN  
 LORazepam (ATIVAN) injection 1 mg  1 mg IntraVENous Q15MIN PRN  
  diazePAM (VALIUM) injection 5 mg  5 mg IntraVENous Q6H  
 ketorolac (TORADOL) injection 30 mg  30 mg IntraVENous Q8H PRN  
 glycopyrrolate (ROBINUL) injection 0.2 mg  0.2 mg IntraVENous Q4H PRN  
 bisacodyL (DULCOLAX) suppository 10 mg  10 mg Rectal DAILY PRN  
 fentaNYL citrate (PF) injection 50 mcg  50 mcg IntraVENous Q15MIN PRN  
 fentaNYL citrate (PF) injection 25 mcg  25 mcg IntraVENous Q2H Visit Time In: 4256 Visit Time Out: 9048

## 2020-03-27 NOTE — PROGRESS NOTES
I was called to pronounce . Patient found unresponsive in his room. Pupils were bilaterally fixed and dilated. Corneal reflexes absent. Both apical and carotid pulses were absent. There is no breathing chest wall movement and no breath sounds during auscultation. Patient pronounced  at 2355. Discharge/death summary to be completed by the primary attending Dr. Marcelo Lambert

## 2020-03-27 NOTE — ROUTINE PROCESS
2168 
Notified patients daughter Chris Wayne and ex wife Flaquito Eugene of patient passing. 1586 Spoke with patient daughter Chris Wayne about  arrangements.  home is [employer] in [address] [facility] [address] [facility], [phone].

## 2020-03-27 NOTE — PROGRESS NOTES
I was paged after Mr. Princess Hunter had . No family was present when I arrived and it is unclear at this point if they will be returning to the hospital. [facility] remains available as needed. Rev. Bill Pena M.Div, [facility] Lead

## 2020-03-27 NOTE — HOSPICE
[facility] [facility] in [facility] 
[phone] Discharge/Death Nursing Note Patient Name: Robi Adjutant YOB: 1950 Age: 81 y.o. Date of Death: 2020 Admitted Date: 3/25/2020 Time of Death:  Facility of Care: [facility] Level of Care: GIP Patient Room: [facility] Hospice Attending: No att. providers found Hospice Diagnosis: Respiratory failure ([facility] [address] [J96.90] Death Pronouncement Pronouncement of death completed by: Dr. Alcira Anaya staff was not present at the time of death At the time of death the patient was documented as Pupils were bilaterally fixed and dilated.  Corneal reflexes absent.  Both apical and carotid pulses were absent.  There is no breathing chest wall movement and no breath sounds during auscultation The pt  within [facility] The following were notified of the patient's death: Rn supervisor, Hospice MD, hospice intake Medications were disposed of per facility protocol Discharge Summary Discharge Reason: Death Summary of Care Provided: 
 
[x] Post mortem care provided by bedside RN [x] Notification of  home by nursing supervisor 
[] Referrals/Community resources provided:  
[] Goals completed 
[] Durable Medical Equipment vendor notified Disciplines involved: [x] RN [x] SW []  [] HA [] Vol [] PT [] OT [] ST [] BC 
 
[x] IDT communication/notification Attending Physician, Yusef North, notified of death Bereaved Verify bereaved identified with name, address, telephone number and risk level Advance Care Planning 3/26/2020 Patient's Healthcare Decision Maker is: - Confirm Advance Directive None Patient Would Like to Complete Advance Directive No

## 2020-03-31 ENCOUNTER — HOME CARE VISIT (OUTPATIENT)
Dept: HOSPICE | Facility: HOSPICE | Age: 70
End: 2020-03-31
Payer: MEDICARE

## 2020-04-01 NOTE — DISCHARGE SUMMARY
Hospice Discharge Summary [employer] [facility] Those in Need Date of Admission: 3/25/2020 Date of Discharge: 3/26/2020 Janeth Bryant is a 81y.o. year old who was admitted to [facility] [facility] at [facility] with a Hospice diagnosis of Respiratory failure ([facility] [address] [J96.90]. The patient's care was focused on comfort and the patient passed away on 3/26/2020. Candance Huger

## 2021-03-25 NOTE — PROGRESS NOTES
Luis Espinosa [facility] 
[address] [facility], [address] [place] [address] 
[phone] Medical Progress Note NAME: [place] :  1950 MRM:  [id number] Date/Time: 3/14/2020 Assessment / Plan:  
 
Acute on chronic respiratory failure with hypoxia: 2/2 COPD exacerbation. No PE on CTA chest. No PNA on imaging. Stable today on NPPV. His respiratory failure which worsened yesterday is more due to hypercapnia. Certainly ddx includes COVID-19 however low suspicion right now. Further, we are unable to test as VDH declined as pt does not meet testing criteria. Pulmonology following, discussed with Dr. Priscilla Murrieta. Agree with diuresis however decrease dose due to borderline hypotension. COPD exacerbation: unclear precipitating factor. No evidence of pneumonia on chest imaging. Flu and RVP negative. Continue doxy. Continue duo-nebs, steroids, LABA/ICS.   
 
SIRS / Elevated lactic acid: resolved. No source of infection thus far other than possible viral. Monitor clinically closely  
  
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 Improved. Will continue Prozac for now 
  
Anxiety: severe and contributing to dyspnea. Better/stable. Continue Prozac, Valium, Geodon 
  
HTN : HOLD metoprolol due to borderline bradycardia and hypotension HLD: continue statin 
  
 
Total time spent: 35 minutes Time spent in the care of this patient including reviewing records, discussing with nursing and/or other providers on the treatment team, obtaining history and examining the patient, and discussing treatment plans. Care Plan discussed with: Patient, Nursing Staff and >50% of time spent in counseling and coordination of care Discussed:  Care Plan and D/C Planning Prophylaxis:  Lovenox Disposition:  Home w/Family Subjective: Chief Complaint:  Follow up COPD Chart/notes/labs/studies reviewed, patient examined at bedside. Feels better. No fever. No CP. +SOB Objective:  
 
 
Vitals:  
 
  
Last 24hrs VS reviewed since prior progress note. Most recent are: 
 
Visit Vitals /54 (BP 1 Location: Right arm, BP Patient Position: At rest) Pulse (!) 58 Temp 98.1 °F (36.7 °C) Resp 18 Ht 5' 9\" (1.753 m) Wt 66.6 kg (146 lb 14.4 oz) SpO2 98% BMI 21.69 kg/m² SpO2 Readings from Last 6 Encounters:  
03/14/20 98% O2 Flow Rate (L/min): 35 l/min Intake/Output Summary (Last 24 hours) at 3/14/2020 1509 Last data filed at 3/14/2020 1015 Gross per 24 hour Intake 1210 ml Output 3200 ml Net -1990 ml Exam:  
 
Physical Exam: 
 
Gen: disheveled, chronically ill-appearing HEENT:  Sclerae nonicteric, hearing intact to voice, mucous membranes moist 
Neck:  Supple, without masses. Resp:  No accessory muscle use with increased WOB. Diminished with mild wheezing. No rales or rhonchi 
Card: RRR, without m/r/g. No LE edema. Abd:  +bowel sounds, soft, NTTP, nondistended. No HSM. Neuro: Face symmetric, tongue midline, speech fluent, follows commands appropriately Psych:  Alert, oriented x 3. Fair insight Medications Reviewed: (see below) Lab Data Reviewed: (see below) 
 
______________________________________________________________________ Medications:  
 
Current Facility-Administered Medications Medication Dose Route Frequency  [START ON 3/15/2020] bumetanide (BUMEX) injection 1 mg  1 mg IntraVENous DAILY  pantoprazole (PROTONIX) tablet 40 mg  40 mg Oral ACB  sodium chloride (NS) flush 5-40 mL  5-40 mL IntraVENous Q8H  
 sodium chloride (NS) flush 5-40 mL  5-40 mL IntraVENous PRN  
 ondansetron (ZOFRAN) injection 4 mg  4 mg IntraVENous Q4H PRN  
 enoxaparin (LOVENOX) injection 40 mg  40 mg SubCUTAneous Q24H  
 nicotine (NICODERM CQ) 21 mg/24 hr patch 1 Patch  1 Patch TransDERmal DAILY  methylPREDNISolone (PF) (Solu-MEDROL) injection 60 mg  60 mg IntraVENous Q6H  
  senna (SENOKOT) tablet 17.2 mg  2 Tab Oral QHS  FLUoxetine (PROzac) capsule 60 mg  60 mg Oral DAILY  docusate sodium (COLACE) capsule 100 mg  100 mg Oral TID PRN  
 aspirin delayed-release tablet 81 mg  81 mg Oral DAILY  atorvastatin (LIPITOR) tablet 40 mg  40 mg Oral QHS  acetaminophen (TYLENOL) tablet 650 mg  650 mg Oral Q6H PRN  
 diazePAM (VALIUM) tablet 2.5 mg  2.5 mg Oral BID  dorzolamide (TRUSOPT) 2 % ophthalmic solution 1 Drop  1 Drop Both Eyes TID  ziprasidone (GEODON) capsule 40 mg  40 mg Oral BID WITH MEALS  doxycycline (VIBRAMYCIN) 100 mg in 0.9% sodium chloride (MBP/ADV) 100 mL  100 mg IntraVENous Q12H  
 brimonidine (ALPHAGAN) 0.2 % ophthalmic solution 1 Drop  1 Drop Both Eyes TID  budesonide (PULMICORT) 500 mcg/2 ml nebulizer suspension  500 mcg Nebulization BID RT  
 arformoteroL (BROVANA) neb solution 15 mcg  15 mcg Nebulization BID RT  
 melatonin tablet 9 mg  9 mg Oral QHS  prazosin (MINIPRESS) capsule 4 mg  4 mg Oral QHS  albuterol-ipratropium (DUO-NEB) 2.5 MG-0.5 MG/3 ML  3 mL Nebulization Q4H RT  
 guaiFENesin ER (MUCINEX) tablet 1,200 mg  1,200 mg Oral BID  sodium chloride (NS) flush 5-10 mL  5-10 mL IntraVENous PRN Lab Review:  
 
Recent Labs  
  03/14/20 
0445 03/13/20 
0206 03/12/20 
0144 WBC 7.8 7.4 7.0 HGB 12.2 12.4 12.4 HCT 37.3 37.3 36.6  183 199 Recent Labs  
  03/14/20 
0445 03/13/20 
0206 03/12/20 
0144 03/11/20 
2209 * 134* 129* 129*  
K 4.3 4.5 3.7 4.0  
CL 94* 99 95* 90* CO2 39* 32 29 32 * 165* 173* 114* BUN 16 10 7 9 CREA 0.71 0.64* 0.84 0.83 CA 8.2* 8.1* 7.8* 8.9 MG 1.8  --   --  1.2*  
PHOS 2.4*  --   --   --   
ALB 3.0*  --  3.5 4.0  
SGOT 33  --  23 28 ALT 31  --  25 30 No components found for: [place] Recent Labs  
  03/14/20 
0723 03/13/20 
1650 03/13/20 
1155 PH 7.34* 7.31* 7.31* PCO2 73* 67* 70* PO2 95 154* 140* HCO3 39* 33* 35* FIO2 40 50 50 No results for input(s): INR, INREXT in the last 72 hours. No results found for: SDES Lab Results Component Value Date/Time Culture result: NO GROWTH 3 DAYS 03/11/2020 10:24 PM  
 Culture result: NO GROWTH 3 DAYS 03/11/2020 10:09 PM  
 
        
___________________________________________________ Attending Physician: Kena Zhang MD  
 
 Statement Selected

## 2023-04-14 NOTE — PROGRESS NOTES
Bedside shift change report given to Tisha ROCHA (oncoming nurse) by Charmaine Singer (offgoing nurse). Report included the following information SBAR and Kardex. Implemented All Universal Safety Interventions:  [place] to call system. Call bell, personal items and telephone within reach. Instruct patient to call for assistance. Room bathroom lighting operational. Non-slip footwear when patient is off stretcher. Physically safe environment: no spills, clutter or unnecessary equipment. Stretcher in lowest position, wheels locked, appropriate side rails in place.